# Patient Record
Sex: FEMALE | Race: WHITE | NOT HISPANIC OR LATINO | Employment: FULL TIME | URBAN - METROPOLITAN AREA
[De-identification: names, ages, dates, MRNs, and addresses within clinical notes are randomized per-mention and may not be internally consistent; named-entity substitution may affect disease eponyms.]

---

## 2017-01-19 ENCOUNTER — ALLSCRIPTS OFFICE VISIT (OUTPATIENT)
Dept: OTHER | Facility: OTHER | Age: 57
End: 2017-01-19

## 2017-01-19 DIAGNOSIS — N64.4 MASTODYNIA: ICD-10-CM

## 2017-01-19 DIAGNOSIS — R92.8 OTHER ABNORMAL AND INCONCLUSIVE FINDINGS ON DIAGNOSTIC IMAGING OF BREAST: ICD-10-CM

## 2017-01-19 DIAGNOSIS — Z12.31 ENCOUNTER FOR SCREENING MAMMOGRAM FOR MALIGNANT NEOPLASM OF BREAST: ICD-10-CM

## 2017-01-23 ENCOUNTER — LAB CONVERSION - ENCOUNTER (OUTPATIENT)
Dept: OTHER | Facility: OTHER | Age: 57
End: 2017-01-23

## 2017-01-23 LAB
ADDITIONAL INFORMATION (HISTORICAL): NORMAL
ADEQUACY: (HISTORICAL): NORMAL
COMMENT (HISTORICAL): NORMAL
CYTOTECHNOLOGIST: (HISTORICAL): NORMAL
INTERPRETATION (HISTORICAL): NORMAL
LMP (HISTORICAL): NORMAL
PREV. BX: (HISTORICAL): NORMAL
PREV. PAP (HISTORICAL): NORMAL
SOURCE (HISTORICAL): NORMAL

## 2017-01-30 ENCOUNTER — HOSPITAL ENCOUNTER (OUTPATIENT)
Dept: MAMMOGRAPHY | Facility: MEDICAL CENTER | Age: 57
Discharge: HOME | End: 2017-01-30
Payer: COMMERCIAL

## 2017-01-30 DIAGNOSIS — Z12.31 ENCOUNTER FOR SCREENING MAMMOGRAM FOR MALIGNANT NEOPLASM OF BREAST: ICD-10-CM

## 2017-01-30 PROCEDURE — 77063 BREAST TOMOSYNTHESIS BI: CPT

## 2017-01-30 PROCEDURE — G0202 SCR MAMMO BI INCL CAD: HCPCS

## 2017-02-02 ENCOUNTER — HOSPITAL ENCOUNTER (OUTPATIENT)
Dept: MAMMOGRAPHY | Facility: CLINIC | Age: 57
Discharge: HOME | End: 2017-02-02
Payer: COMMERCIAL

## 2017-02-02 ENCOUNTER — HOSPITAL ENCOUNTER (OUTPATIENT)
Dept: ULTRASOUND IMAGING | Facility: CLINIC | Age: 57
Discharge: HOME | End: 2017-02-02
Payer: COMMERCIAL

## 2017-02-02 DIAGNOSIS — R92.8 MAMMOGRAM ABNORMAL: ICD-10-CM

## 2017-02-02 DIAGNOSIS — N64.4 MASTODYNIA: ICD-10-CM

## 2017-02-02 DIAGNOSIS — R92.8 OTHER ABNORMAL AND INCONCLUSIVE FINDINGS ON DIAGNOSTIC IMAGING OF BREAST: ICD-10-CM

## 2017-02-02 PROCEDURE — G0206 DX MAMMO INCL CAD UNI: HCPCS

## 2017-02-02 PROCEDURE — 76642 ULTRASOUND BREAST LIMITED: CPT

## 2017-08-28 ENCOUNTER — TRANSCRIBE ORDERS (OUTPATIENT)
Dept: ADMINISTRATIVE | Facility: HOSPITAL | Age: 57
End: 2017-08-28

## 2017-08-28 DIAGNOSIS — R00.1 SEVERE SINUS BRADYCARDIA: Primary | ICD-10-CM

## 2017-09-13 ENCOUNTER — HOSPITAL ENCOUNTER (OUTPATIENT)
Dept: NON INVASIVE DIAGNOSTICS | Facility: CLINIC | Age: 57
Discharge: HOME | End: 2017-09-13
Payer: COMMERCIAL

## 2017-09-13 DIAGNOSIS — R00.1 SEVERE SINUS BRADYCARDIA: ICD-10-CM

## 2017-09-13 LAB
CHEST PAIN STATEMENT: NORMAL
MAX DIASTOLIC BP: 100 MMHG
MAX HEART RATE: 164 BPM
MAX PREDICTED HEART RATE: 164 BPM
MAX. SYSTOLIC BP: 160 MMHG
PROTOCOL NAME: NORMAL
TARGET HR FORMULA: NORMAL
TEST INDICATION: NORMAL
TIME IN EXERCISE PHASE: 361 S

## 2017-09-13 PROCEDURE — 93017 CV STRESS TEST TRACING ONLY: CPT

## 2018-01-13 VITALS
WEIGHT: 272.5 LBS | DIASTOLIC BLOOD PRESSURE: 96 MMHG | BODY MASS INDEX: 39.01 KG/M2 | SYSTOLIC BLOOD PRESSURE: 142 MMHG | HEIGHT: 70 IN

## 2018-11-07 DIAGNOSIS — E78.2 MIXED HYPERLIPIDEMIA: Primary | ICD-10-CM

## 2018-11-08 RX ORDER — ATORVASTATIN CALCIUM 10 MG/1
TABLET, FILM COATED ORAL
Qty: 30 TABLET | Refills: 5 | Status: SHIPPED | OUTPATIENT
Start: 2018-11-08 | End: 2019-04-15 | Stop reason: SDUPTHER

## 2019-04-15 ENCOUNTER — OFFICE VISIT (OUTPATIENT)
Dept: CARDIOLOGY CLINIC | Facility: CLINIC | Age: 59
End: 2019-04-15
Payer: COMMERCIAL

## 2019-04-15 VITALS
HEIGHT: 71 IN | DIASTOLIC BLOOD PRESSURE: 80 MMHG | RESPIRATION RATE: 18 BRPM | BODY MASS INDEX: 36.09 KG/M2 | SYSTOLIC BLOOD PRESSURE: 120 MMHG | HEART RATE: 70 BPM | WEIGHT: 257.8 LBS

## 2019-04-15 DIAGNOSIS — E78.2 MIXED HYPERLIPIDEMIA: Primary | ICD-10-CM

## 2019-04-15 DIAGNOSIS — R00.1 BRADYCARDIA: ICD-10-CM

## 2019-04-15 PROCEDURE — 99213 OFFICE O/P EST LOW 20 MIN: CPT | Performed by: INTERNAL MEDICINE

## 2019-04-15 RX ORDER — ATORVASTATIN CALCIUM 20 MG/1
20 TABLET, FILM COATED ORAL DAILY
Qty: 90 TABLET | Refills: 3 | Status: SHIPPED | OUTPATIENT
Start: 2019-04-15 | End: 2020-05-14

## 2019-08-20 ENCOUNTER — OFFICE VISIT (OUTPATIENT)
Dept: FAMILY MEDICINE CLINIC | Facility: CLINIC | Age: 59
End: 2019-08-20
Payer: COMMERCIAL

## 2019-08-20 VITALS
DIASTOLIC BLOOD PRESSURE: 84 MMHG | OXYGEN SATURATION: 97 % | RESPIRATION RATE: 18 BRPM | SYSTOLIC BLOOD PRESSURE: 122 MMHG | HEIGHT: 70 IN | WEIGHT: 266.2 LBS | TEMPERATURE: 98.3 F | BODY MASS INDEX: 38.11 KG/M2 | HEART RATE: 76 BPM

## 2019-08-20 DIAGNOSIS — Z12.31 SCREENING MAMMOGRAM, ENCOUNTER FOR: ICD-10-CM

## 2019-08-20 DIAGNOSIS — Z76.89 ENCOUNTER TO ESTABLISH CARE: ICD-10-CM

## 2019-08-20 DIAGNOSIS — E78.5 HYPERLIPIDEMIA, UNSPECIFIED HYPERLIPIDEMIA TYPE: ICD-10-CM

## 2019-08-20 DIAGNOSIS — Z12.11 COLON CANCER SCREENING: ICD-10-CM

## 2019-08-20 DIAGNOSIS — Z00.00 ROUTINE HEALTH MAINTENANCE: Primary | ICD-10-CM

## 2019-08-20 DIAGNOSIS — K21.9 GASTROESOPHAGEAL REFLUX DISEASE WITHOUT ESOPHAGITIS: ICD-10-CM

## 2019-08-20 DIAGNOSIS — M79.622 LEFT UPPER ARM PAIN: ICD-10-CM

## 2019-08-20 DIAGNOSIS — R53.83 FATIGUE, UNSPECIFIED TYPE: ICD-10-CM

## 2019-08-20 LAB
SL AMB  POCT GLUCOSE, UA: NORMAL
SL AMB LEUKOCYTE ESTERASE,UA: NORMAL
SL AMB POCT BILIRUBIN,UA: NORMAL
SL AMB POCT BLOOD,UA: NORMAL
SL AMB POCT CLARITY,UA: CLEAR
SL AMB POCT COLOR,UA: YELLOW
SL AMB POCT KETONES,UA: NORMAL
SL AMB POCT NITRITE,UA: NORMAL
SL AMB POCT PH,UA: 5
SL AMB POCT SPECIFIC GRAVITY,UA: 1
SL AMB POCT URINE PROTEIN: NORMAL
SL AMB POCT UROBILINOGEN: 0.2

## 2019-08-20 PROCEDURE — 81002 URINALYSIS NONAUTO W/O SCOPE: CPT | Performed by: FAMILY MEDICINE

## 2019-08-20 PROCEDURE — 99386 PREV VISIT NEW AGE 40-64: CPT | Performed by: FAMILY MEDICINE

## 2019-08-20 NOTE — PATIENT INSTRUCTIONS

## 2019-08-20 NOTE — PROGRESS NOTES
FAMILY PRACTICE OFFICE VISIT       NAME: Timo Lux  AGE: 62 y o  SEX: female       : 1960        MRN: [id number]    DATE: 2019  TIME: 10:42 PM    Assessment and Plan     Problem List Items Addressed This Visit     None      Visit Diagnoses     Routine health maintenance    -  Primary    Encounter to establish care        Relevant Orders    POCT urine dip (Completed)    Hyperlipidemia, unspecified hyperlipidemia type        Relevant Orders    Comprehensive metabolic panel    Lipid Panel with Direct LDL reflex    Hemoglobin A1C    Gastroesophageal reflux disease without esophagitis        Relevant Orders    Vitamin B12    Magnesium    BMI 38 0-38 9,adult        Relevant Orders    Hemoglobin A1C    Ambulatory referral to Weight Management    Left upper arm pain        Colon cancer screening        Relevant Orders    Ambulatory referral to Gastroenterology    Screening mammogram, encounter for        Relevant Orders    Mammo screening bilateral w 3d & cad    Fatigue, unspecified type        Relevant Orders    CBC and differential    TSH, 3rd generation with Free T4 reflex    Vitamin D 25 hydroxy        Routine health maintenance:  I will go ahead and check routine blood work  Rx for mammogram provided  Referral to GI provided for screening colonoscopy  Up-to-date with Pap and pelvic exam done by Dr Miranda   Up-to-date with Tdap  Patient maintains well-balanced diet  She exercises regularly  Mood is overall good  Care guided provided  Hyperlipidemia:  Patient is on atorvastatin  Will check lipid panel  Continue with lifestyle modifications including incorporating Mediterranean diet  GERD:  Patient does take Nexium  I feel she would benefit from weaning off Nexium and substituting this with Zantac or Pepcid as well as avoiding triggers that may cause reflux symptoms  May benefit from looking into [place] times bestseller book called dropping acid as well      BMI 38:  Patient states she has tried to lose weight  She maintains well-balanced diet, exercises regularly  I feel she would benefit from evaluation by weight management  She is agreeable  Referral provided  Left upper arm discomfort:  May be secondary to tendinitis versus strain  Patient does not wish to have any workup at present  She will let me know if symptoms worsen  May benefit from physical therapy  May consider ordering ultrasound if symptoms do worsen  Patient Instructions     Wellness Visit for Adults   AMBULATORY CARE:   A wellness visit  is when you see your healthcare provider to get screened for health problems  You can also get advice on how to stay healthy  Write down your questions so you remember to ask them  Ask your healthcare provider how often you should have a wellness visit  What happens at a wellness visit:  Your healthcare provider will ask about your health, and your family history of health problems  This includes high blood pressure, heart disease, and cancer  He or she will ask if you have symptoms that concern you, if you smoke, and about your mood  You may also be asked about your intake of medicines, supplements, food, and alcohol  Any of the following may be done:  · Your weight  will be checked  Your height may also be checked so your body mass index (BMI) can be calculated  Your BMI shows if you are at a healthy weight  · Your blood pressure  and heart rate will be checked  Your temperature may also be checked  · Blood and urine tests  may be done  Blood tests may be done to check your cholesterol levels  Abnormal cholesterol levels increase your risk for heart disease and stroke  You may also need a blood or urine test to check for diabetes if you are at increased risk  Urine tests may be done to look for signs of an infection or kidney disease  · A physical exam  includes checking your heartbeat and lungs with a stethoscope   Your healthcare provider may also check your skin to look for sun damage  · Screening tests  may be recommended  A screening test is done to check for diseases that may not cause symptoms  The screening tests you may need depend on your age, gender, family history, and lifestyle habits  For example, colorectal screening may be recommended if you are 48years old or older  Screening tests you need if you are a woman:   · A Pap smear  is used to screen for cervical cancer  Pap smears are usually done every 3 to 5 years depending on your age  You may need them more often if you have had abnormal Pap smear test results in the past  Ask your healthcare provider how often you should have a Pap smear  · A mammogram  is an x-ray of your breasts to screen for breast cancer  Experts recommend mammograms every 2 years starting at age 48 years  You may need a mammogram at age 52 years or younger if you have an increased risk for breast cancer  Talk to your healthcare provider about when you should start having mammograms and how often you need them  Vaccines you may need:   · Get an influenza vaccine  every year  The influenza vaccine protects you from the flu  Several types of viruses cause the flu  The viruses change over time, so new vaccines are made each year  · Get a tetanus-diphtheria (Td) booster vaccine  every 10 years  This vaccine protects you against tetanus and diphtheria  Tetanus is a severe infection that may cause painful muscle spasms and lockjaw  Diphtheria is a severe bacterial infection that causes a thick covering in the back of your mouth and throat  · Get a human papillomavirus (HPV) vaccine  if you are female and aged 23 to 32 or male 23 to 24 and never received it  This vaccine protects you from HPV infection  HPV is the most common infection spread by sexual contact  HPV may also cause vaginal, penile, and anal cancers  · Get a pneumococcal vaccine  if you are aged 72 years or older   The pneumococcal vaccine is an injection given to protect you from pneumococcal disease  Pneumococcal disease is an infection caused by pneumococcal bacteria  The infection may cause pneumonia, meningitis, or an ear infection  · Get a shingles vaccine  if you are aged 61 or older, even if you have had shingles before  The shingles vaccine is an injection to protect you from the varicella-zoster virus  This is the same virus that causes chickenpox  Shingles is a painful rash that develops in people who had chickenpox or have been exposed to the virus  How to eat healthy:  My Plate is a model for planning healthy meals  It shows the types and amounts of foods that should go on your plate  Fruits and vegetables make up about half of your plate, and grains and protein make up the other half  A serving of dairy is included on the side of your plate  The amount of calories and serving sizes you need depends on your age, gender, weight, and height  Examples of healthy foods are listed below:  · Eat a variety of vegetables  such as dark green, red, and orange vegetables  You can also include canned vegetables low in sodium (salt) and frozen vegetables without added butter or sauces  · Eat a variety of fresh fruits , canned fruit in 100% juice, frozen fruit, and dried fruit  · Include whole grains  At least half of the grains you eat should be whole grains  Examples include whole-wheat bread, wheat pasta, brown rice, and whole-grain cereals such as oatmeal     · Eat a variety of protein foods such as seafood (fish and shellfish), lean meat, and poultry without skin (turkey and chicken)  Examples of lean meats include pork leg, shoulder, or tenderloin, and beef round, sirloin, tenderloin, and extra lean ground beef  Other protein foods include eggs and egg substitutes, beans, peas, soy products, nuts, and seeds  · Choose low-fat dairy products such as skim or 1% milk or low-fat yogurt, cheese, and cottage cheese       · Limit unhealthy fats such as butter, hard margarine, and shortening  Exercise:  Exercise at least 30 minutes per day on most days of the week  Some examples of exercise include walking, biking, dancing, and swimming  You can also fit in more physical activity by taking the stairs instead of the elevator or parking farther away from stores  Include muscle strengthening activities 2 days each week  Regular exercise provides many health benefits  It helps you manage your weight, and decreases your risk for type 2 diabetes, heart disease, stroke, and high blood pressure  Exercise can also help improve your mood  Ask your healthcare provider about the best exercise plan for you  General health and safety guidelines:   · Do not smoke  Nicotine and other chemicals in cigarettes and cigars can cause lung damage  Ask your healthcare provider for information if you currently smoke and need help to quit  E-cigarettes or smokeless tobacco still contain nicotine  Talk to your healthcare provider before you use these products  · Limit alcohol  A drink of alcohol is 12 ounces of beer, 5 ounces of wine, or 1½ ounces of liquor  · Lose weight, if needed  Being overweight increases your risk of certain health conditions  These include heart disease, high blood pressure, type 2 diabetes, and certain types of cancer  · Protect your skin  Do not sunbathe or use tanning beds  Use sunscreen with a SPF 15 or higher  Apply sunscreen at least 15 minutes before you go outside  Reapply sunscreen every 2 hours  Wear protective clothing, hats, and sunglasses when you are outside  · Drive safely  Always wear your seatbelt  Make sure everyone in your car wears a seatbelt  A seatbelt can save your life if you are in an accident  Do not use your cell phone when you are driving  This could distract you and cause an accident  Pull over if you need to make a call or send a text message  · Practice safe sex    Use latex condoms if are sexually active and have more than one partner  Your healthcare provider may recommend screening tests for sexually transmitted infections (STIs)  · Wear helmets, lifejackets, and protective gear  Always wear a helmet when you ride a bike or motorcycle, go skiing, or play sports that could cause a head injury  Wear protective equipment when you play sports  Wear a lifejacket when you are on a boat or doing water sports  © 2017 [employer] Information is for End User's use only and may not be sold, redistributed or otherwise used for commercial purposes  All illustrations and images included in CareNotes® are the copyrighted property of A D A M , Inc  or [employer]  The above information is an  only  It is not intended as medical advice for individual conditions or treatments  Talk to your doctor, nurse or pharmacist before following any medical regimen to see if it is safe and effective for you  Chief Complaint     Chief Complaint   Patient presents with    Establish Care    Well Check    Arm Pain     Left upper arm       History of Present Illness     HPI  80-year-old female with a history of HLD, GERD, uveitis here to establish care  Prior PCP was Dr Madrid who recently retired  Has h/o uveitis, follows with retinologist Dr Jorge Alberto Browne once every 9 months   Autoimmune markers have been normal  Has a h/o lyme disease treated for it 20 yrs ago  H/o fracture of L1, L4-L5 disc bulge  Has been seen by [facility] in the past   Sees dermatoligst, Dr Yodit He, once a year  Has a h/o squamous cell skin ca  Social alcohol use  2-3 cups coffee daily   Usually consumes Water, seltzer water  Exercises regularly, does yoga 2x/week, works out with  once a week, spin class, boot camp once a week    2 yrs ago, had bouts of dizziness   Has seen dr Lemus Fly dr carrillo for Pap and pelvic exam, last seen 2 yrs ago   last pap 2 ys ago, wnl  Last mammogram in 2017  Takes turmeric capsules, calcium 500 mg, vitamin d 1000 iu, magnesium 30 mg     Has left upper arm pain X 1 month, intermittent, aggravated with activity,  was lifting boxes   lifting arm upwards aggravates her pain  Does a lot of computer work  Took ibuprofen, last taken 1 week ago   Is right handed   denies tinglng, numbness, weakness  Patient does not wish to have any workup at present  She will monitor  Review of Systems   Review of Systems   Constitutional: Negative for unexpected weight change  Eyes: Negative for visual disturbance  Respiratory: Negative for shortness of breath  Cardiovascular: Negative  Gastrointestinal: Negative for abdominal pain and constipation  Genitourinary: Negative for dysuria  Neurological: Negative for dizziness and light-headedness  Psychiatric/Behavioral: Negative for dysphoric mood and sleep disturbance         Active Problem List     Patient Active Problem List   Diagnosis    Mixed hyperlipidemia    Bradycardia       Past Medical History:  Past Medical History:   Diagnosis Date    Back pain     Bradycardia     DDD (degenerative disc disease), lumbar     DJD (degenerative joint disease)     Female infertility     GERD (gastroesophageal reflux disease)     Hypercholesterolemia     Obesity     Uveitis     Vertigo        Past Surgical History:  Past Surgical History:   Procedure Laterality Date    DILATION AND CURETTAGE OF UTERUS      HYSTEROSCOPY  01/09/2012    MYOMECTOMY      TONSILLECTOMY AND ADENOIDECTOMY         Family History:  Family History   Problem Relation Age of Onset    Hypertension Mother     Heart murmur Father     Hyperlipidemia Family        Social History:  Social History     Socioeconomic History    Marital status: /Civil Union     Spouse name: Not on file    Number of children: Not on file    Years of education: Not on file    Highest education level: Not on file   Occupational History    Not on file   Social Needs    Financial resource strain: Not on file    Food insecurity:     Worry: Not on file     Inability: Not on file    Transportation needs:     Medical: Not on file     Non-medical: Not on file   Tobacco Use    Smoking status: Never Smoker    Smokeless tobacco: Never Used   Substance and Sexual Activity    Alcohol use: Yes     Frequency: Monthly or less     Drinks per session: 1 or 2     Binge frequency: Never     Comment: socially    Drug use: Not on file    Sexual activity: Not on file   Lifestyle    Physical activity:     Days per week: Not on file     Minutes per session: Not on file    Stress: Not on file   Relationships    Social connections:     Talks on phone: Not on file     Gets together: Not on file     Attends Confucianist service: Not on file     Active member of club or organization: Not on file     Attends meetings of clubs or organizations: Not on file     Relationship status: Not on file    Intimate partner violence:     Fear of current or ex partner: Not on file     Emotionally abused: Not on file     Physically abused: Not on file     Forced sexual activity: Not on file   Other Topics Concern    Not on file   Social History Narrative    Not on file     I have reviewed the patient's medical history in detail; there are no changes to the history as noted in the electronic medical record  Objective     Vitals:    08/20/19 1349   BP: 122/84   Pulse: 76   Resp: 18   Temp: 98 3 °F (36 8 °C)   SpO2: 97%     Wt Readings from Last 3 Encounters:   08/20/19 121 kg (266 lb 3 2 oz)   04/15/19 117 kg (257 lb 12 8 oz)   01/19/17 124 kg (272 lb 8 oz)       Physical Exam   Constitutional: She appears well-developed and well-nourished  HENT:   Head: Normocephalic and atraumatic  Mouth/Throat: Oropharynx is clear and moist    TMs intact and clear   Eyes: Pupils are equal, round, and reactive to light  Conjunctivae and EOM are normal    Neck: Normal range of motion  Neck supple  No thyromegaly present  Cardiovascular: Normal rate, regular rhythm and normal heart sounds  No carotid bruits auscultated   Pulmonary/Chest: Effort normal and breath sounds normal    Abdominal: Soft  Bowel sounds are normal  She exhibits no distension  There is no tenderness  Musculoskeletal: Normal range of motion  She exhibits no edema  Lymphadenopathy:     She has no cervical adenopathy  Neurological: She is alert  Psychiatric: She has a normal mood and affect  Nursing note and vitals reviewed        Pertinent Laboratory/Diagnostic Studies:  No results found for: GLUCOSE, BUN, CREATININE, CALCIUM, NA, K, CO2, CL  No results found for: ALT, AST, GGT, ALKPHOS, BILITOT    No results found for: WBC, HGB, HCT, MCV, PLT    No results found for: TSH    No results found for: CHOL  No results found for: TRIG  No results found for: HDL  No results found for: LDLCALC  No results found for: HGBA1C    Results for orders placed or performed in visit on 08/20/19   POCT urine dip   Result Value Ref Range    LEUKOCYTE ESTERASE,UA -     NITRITE,UA -     SL AMB POCT UROBILINOGEN 0 2     POCT URINE PROTEIN -      PH,UA 5 0     BLOOD,UA -     SPECIFIC GRAVITY,UA 1 000     KETONES,UA -     BILIRUBIN,UA -     GLUCOSE, UA -      COLOR,UA Yellow     CLARITY,UA Clear        Orders Placed This Encounter   Procedures    Mammo screening bilateral w 3d & cad    CBC and differential    Comprehensive metabolic panel    Lipid Panel with Direct LDL reflex    TSH, 3rd generation with Free T4 reflex    Vitamin D 25 hydroxy    Vitamin B12    Magnesium    Hemoglobin A1C    Ambulatory referral to Gastroenterology    Ambulatory referral to Weight Management    POCT urine dip       ALLERGIES:  Allergies   Allergen Reactions    Neomycin-Bacitracin Zn-Polymyx Rash       Current Medications     Current Outpatient Medications   Medication Sig Dispense Refill    atorvastatin (LIPITOR) 20 mg tablet Take 1 tablet (20 mg total) by mouth daily 90 tablet 3  esomeprazole (NexIUM) 20 mg capsule Take 20 mg by mouth every morning before breakfast       No current facility-administered medications for this visit            Health Maintenance     Health Maintenance   Topic Date Due    Hepatitis C Screening  1960    Depression Screening PHQ  1960    CRC Screening: Colonoscopy  1960    BMI: Followup Plan  12/25/1978    MAMMOGRAM  02/02/2018    INFLUENZA VACCINE  07/01/2019    BMI: Adult  08/20/2020    DTaP,Tdap,and Td Vaccines (2 - Td) 04/19/2021    Pneumococcal Vaccine: 65+ Years (1 of 2 - PCV13) 12/25/2025    Pneumococcal Vaccine: Pediatrics (0 to 5 Years) and At-Risk Patients (6 to 59 Years)  Aged Out    HEPATITIS B VACCINES  Aged [employer] History   Administered Date(s) Administered    INFLUENZA 11/15/2014, 10/13/2017, 10/09/2018    Tdap 04/19/2011       Hal Toledo MD

## 2019-08-26 ENCOUNTER — OFFICE VISIT (OUTPATIENT)
Dept: CARDIOLOGY CLINIC | Facility: CLINIC | Age: 59
End: 2019-08-26
Payer: COMMERCIAL

## 2019-08-26 VITALS
SYSTOLIC BLOOD PRESSURE: 110 MMHG | HEIGHT: 70 IN | DIASTOLIC BLOOD PRESSURE: 75 MMHG | HEART RATE: 68 BPM | WEIGHT: 265.4 LBS | BODY MASS INDEX: 37.99 KG/M2

## 2019-08-26 DIAGNOSIS — E78.2 MIXED HYPERLIPIDEMIA: Primary | ICD-10-CM

## 2019-08-26 PROCEDURE — 99213 OFFICE O/P EST LOW 20 MIN: CPT | Performed by: INTERNAL MEDICINE

## 2019-08-26 RX ORDER — MULTIVIT-MIN/IRON FUM/FOLIC AC 7.5 MG-4
1 TABLET ORAL DAILY
COMMUNITY

## 2019-08-26 NOTE — PROGRESS NOTES
Assessment/Plan:    Mixed hyperlipidemia  Hyperlipidemia, stable  The patient is scheduled for follow-up lab  She will continue atorvastatin at 20 mg daily  Diagnoses and all orders for this visit:    Mixed hyperlipidemia    Other orders  -     co-enzyme Q-10 30 MG capsule; Take 30 mg by mouth 3 (three) times a day  -     Multiple Vitamins-Minerals (MULTIVITAMIN WITH MINERALS) tablet; Take 1 tablet by mouth daily          Subjective:  Feels well  Patient ID: Lui Thornton is a 62 y o  female  Patient presented to this office for the purpose of cardiac follow-up  She has a known history of hypercholesterolemia  She has been feeling well denying any symptoms of chest pain, shortness of breath, palpitation, dizziness or lightheadedness  She has no leg edema  The following portions of the patient's history were reviewed and updated as appropriate: allergies, current medications, past family history, past medical history, past social history, past surgical history and problem list     Review of Systems   Respiratory: Negative for apnea, cough, chest tightness, shortness of breath and wheezing  Cardiovascular: Negative for chest pain, palpitations and leg swelling  Gastrointestinal: Negative for abdominal pain  Neurological: Negative for dizziness and light-headedness  Objective:  Stable cardiac-wise  /75 (BP Location: Right leg, Patient Position: Sitting, Cuff Size: Large)   Pulse 68   Ht 5' 10" (1 778 m)   Wt 120 kg (265 lb 6 4 oz)   BMI 38 08 kg/m²          Physical Exam   Constitutional: She appears well-developed and well-nourished  No distress  HENT:   Head: Normocephalic and atraumatic  Neck: Normal range of motion  Neck supple  No JVD present  No thyromegaly present  Cardiovascular: Normal rate, regular rhythm, S1 normal and S2 normal  Exam reveals no gallop and no friction rub  Murmur heard     Systolic murmur is present with a grade of 1/6   Pulmonary/Chest: Effort normal  No respiratory distress  She has no wheezes  She has no rales  She exhibits no tenderness  Abdominal: Soft  Skin: She is not diaphoretic  Vitals reviewed

## 2019-08-26 NOTE — ASSESSMENT & PLAN NOTE
Hyperlipidemia, stable  The patient is scheduled for follow-up lab  She will continue atorvastatin at 20 mg daily

## 2019-08-26 NOTE — LETTER
August 26, 2019     Nicholas Alvarez MD  [address] [facility] [address]    Patient: Vashti Rea   YOB: 1960   Date of Visit: 8/26/2019       Dear Dr Roldan Aparicio:    Thank you for referring Jocelin Moreno to me for evaluation  Below are my notes for this consultation  If you have questions, please do not hesitate to call me  I look forward to following your patient along with you  Sincerely,        Martha Sibley MD        CC: No Recipients  Martha Sibley MD  8/26/2019  9:27 AM  Sign at close encounter  Assessment/Plan:    Mixed hyperlipidemia  Hyperlipidemia, stable  The patient is scheduled for follow-up lab  She will continue atorvastatin at 20 mg daily  Diagnoses and all orders for this visit:    Mixed hyperlipidemia    Other orders  -     co-enzyme Q-10 30 MG capsule; Take 30 mg by mouth 3 (three) times a day  -     Multiple Vitamins-Minerals (MULTIVITAMIN WITH MINERALS) tablet; Take 1 tablet by mouth daily          Subjective:  Feels well  Patient ID: Vashti Rea is a 62 y o  female  Patient presented to this office for the purpose of cardiac follow-up  She has a known history of hypercholesterolemia  She has been feeling well denying any symptoms of chest pain, shortness of breath, palpitation, dizziness or lightheadedness  She has no leg edema  The following portions of the patient's history were reviewed and updated as appropriate: allergies, current medications, past family history, past medical history, past social history, past surgical history and problem list     Review of Systems   Respiratory: Negative for apnea, cough, chest tightness, shortness of breath and wheezing  Cardiovascular: Negative for chest pain, palpitations and leg swelling  Gastrointestinal: Negative for abdominal pain  Neurological: Negative for dizziness and light-headedness  Objective:  Stable cardiac-wise        /75 (BP Location: Right leg, Patient Position: Sitting, Cuff Size: Large)   Pulse 68   Ht 5' 10" (1 778 m)   Wt 120 kg (265 lb 6 4 oz)   BMI 38 08 kg/m²           Physical Exam   Constitutional: She appears well-developed and well-nourished  No distress  HENT:   Head: Normocephalic and atraumatic  Neck: Normal range of motion  Neck supple  No JVD present  No thyromegaly present  Cardiovascular: Normal rate, regular rhythm, S1 normal and S2 normal  Exam reveals no gallop and no friction rub  Murmur heard  Systolic murmur is present with a grade of 1/6  Pulmonary/Chest: Effort normal  No respiratory distress  She has no wheezes  She has no rales  She exhibits no tenderness  Abdominal: Soft  Skin: She is not diaphoretic  Vitals reviewed

## 2019-10-30 ENCOUNTER — TELEPHONE (OUTPATIENT)
Dept: FAMILY MEDICINE CLINIC | Facility: CLINIC | Age: 59
End: 2019-10-30

## 2019-10-30 LAB
25(OH)D3 SERPL-MCNC: 38 NG/ML (ref 30–100)
ALBUMIN SERPL-MCNC: 4.3 G/DL (ref 3.6–5.1)
ALBUMIN/GLOB SERPL: 1.9 (CALC) (ref 1–2.5)
ALP SERPL-CCNC: 93 U/L (ref 33–130)
ALT SERPL-CCNC: 14 U/L (ref 6–29)
AST SERPL-CCNC: 14 U/L (ref 10–35)
BASOPHILS # BLD AUTO: 10 CELLS/UL (ref 0–200)
BASOPHILS NFR BLD AUTO: 0.2 %
BILIRUB SERPL-MCNC: 0.6 MG/DL (ref 0.2–1.2)
BUN SERPL-MCNC: 15 MG/DL (ref 7–25)
BUN/CREAT SERPL: NORMAL (CALC) (ref 6–22)
CALCIUM SERPL-MCNC: 9.7 MG/DL (ref 8.6–10.4)
CHLORIDE SERPL-SCNC: 106 MMOL/L (ref 98–110)
CHOLEST SERPL-MCNC: 146 MG/DL
CHOLEST/HDLC SERPL: 3.3 (CALC)
CO2 SERPL-SCNC: 29 MMOL/L (ref 20–32)
CREAT SERPL-MCNC: 0.99 MG/DL (ref 0.5–1.05)
EOSINOPHIL # BLD AUTO: 307 CELLS/UL (ref 15–500)
EOSINOPHIL NFR BLD AUTO: 6.4 %
ERYTHROCYTE [DISTWIDTH] IN BLOOD BY AUTOMATED COUNT: 14.2 % (ref 11–15)
GLOBULIN SER CALC-MCNC: 2.3 G/DL (CALC) (ref 1.9–3.7)
GLUCOSE SERPL-MCNC: 94 MG/DL (ref 65–99)
HBA1C MFR BLD: 5.8 % OF TOTAL HGB
HCT VFR BLD AUTO: 39.5 % (ref 35–45)
HDLC SERPL-MCNC: 44 MG/DL
HGB BLD-MCNC: 13 G/DL (ref 11.7–15.5)
LDLC SERPL CALC-MCNC: 84 MG/DL (CALC)
LYMPHOCYTES # BLD AUTO: 1330 CELLS/UL (ref 850–3900)
LYMPHOCYTES NFR BLD AUTO: 27.7 %
MAGNESIUM SERPL-MCNC: 2 MG/DL (ref 1.5–2.5)
MCH RBC QN AUTO: 30 PG (ref 27–33)
MCHC RBC AUTO-ENTMCNC: 32.9 G/DL (ref 32–36)
MCV RBC AUTO: 91.2 FL (ref 80–100)
MONOCYTES # BLD AUTO: 307 CELLS/UL (ref 200–950)
MONOCYTES NFR BLD AUTO: 6.4 %
NEUTROPHILS # BLD AUTO: 2846 CELLS/UL (ref 1500–7800)
NEUTROPHILS NFR BLD AUTO: 59.3 %
NONHDLC SERPL-MCNC: 102 MG/DL (CALC)
PLATELET # BLD AUTO: 203 THOUSAND/UL (ref 140–400)
PMV BLD REES-ECKER: 12.1 FL (ref 7.5–12.5)
POTASSIUM SERPL-SCNC: 4.3 MMOL/L (ref 3.5–5.3)
PROT SERPL-MCNC: 6.6 G/DL (ref 6.1–8.1)
RBC # BLD AUTO: 4.33 MILLION/UL (ref 3.8–5.1)
SL AMB EGFR AFRICAN AMERICAN: 73 ML/MIN/1.73M2
SL AMB EGFR NON AFRICAN AMERICAN: 63 ML/MIN/1.73M2
SODIUM SERPL-SCNC: 141 MMOL/L (ref 135–146)
TRIGL SERPL-MCNC: 85 MG/DL
TSH SERPL-ACNC: 3.84 MIU/L (ref 0.4–4.5)
VIT B12 SERPL-MCNC: 732 PG/ML (ref 200–1100)
WBC # BLD AUTO: 4.8 THOUSAND/UL (ref 3.8–10.8)

## 2019-10-30 NOTE — TELEPHONE ENCOUNTER
I would like to see her on a yearly basis for physical   We can check her blood sugar at that point as it is only mildly elevated  I am also happy to recheck it again in 6 months if she would like as well    Thank you

## 2019-10-30 NOTE — TELEPHONE ENCOUNTER
----- Message from Dylan Harper MD sent at 10/30/2019 12:33 PM EDT -----  Please let patient know that her blood work including liver, kidneys, vitamin B12, magnesium, thyroid was within normal range  Her hemoglobin A1c, measure blood sugar over 3 month period was noted to be in the early prediabetic range  I would like her to work on avoiding sweets, limiting carbohydrates and working on routine exercise regimen  In addition, her vitamin-D was normal but it was a little lower normal and I would like her to take an additional 1000 International Units in the winter months  In addition, her total cholesterol and bad cholesterol were good however her good cholesterol is a little lower and I would like her to work on increasing heart healthy fats and diet by incorporating Mediterranean diet as well as regular exercise    Thank you

## 2019-10-30 NOTE — RESULT ENCOUNTER NOTE
Please let patient know that her blood work including liver, kidneys, vitamin B12, magnesium, thyroid was within normal range  Her hemoglobin A1c, measure blood sugar over 3 month period was noted to be in the early prediabetic range  I would like her to work on avoiding sweets, limiting carbohydrates and working on routine exercise regimen  In addition, her vitamin-D was normal but it was a little lower normal and I would like her to take an additional 1000 International Units in the winter months  In addition, her total cholesterol and bad cholesterol were good however her good cholesterol is a little lower and I would like her to work on increasing heart healthy fats and diet by incorporating Mediterranean diet as well as regular exercise    Thank you

## 2019-11-26 ENCOUNTER — TELEPHONE (OUTPATIENT)
Dept: FAMILY MEDICINE CLINIC | Facility: CLINIC | Age: 59
End: 2019-11-26

## 2019-12-26 NOTE — PROGRESS NOTES
Assessment/Plan:      Diagnoses and all orders for this visit:    Obesity, Class II, BMI 35-39 9    BMI 38 0-38 9,adult  -     Ambulatory referral to Weight Management    Gastroesophageal reflux disease, esophagitis presence not specified    Mixed hyperlipidemia    IFG (impaired fasting glucose)      -Discussed options of Healthy[facility]-Intensive Lifestyle Intervention Program, Very Low Calorie Diet-VLCD and Conservative Program and the role of weight loss medications   -Initial weight loss goal of 5-10% weight loss for improved health  -Screening labs- 10/29/19  -Patient is interested in pursuing Conservative Program    Goals:  Food log (ie ) www myfitnesspal com,sparkpeople  com,loseit com,calorieking  com,etc  baritastic  No sugary beverages  At least 64oz of water daily  Increase physical activity by 10 minutes daily  Gradually increase physical activity to a goal of 5 days per week for 30 minutes of MODERATE intensity PLUS 2 days per week of FULL BODY resistance training  Goal protein intake of 60-80 grams per day and 2039-3508 (exercise days) calories per day  IF- 10-6pm    45 minute visit, >50% face-to-face time spent counseling patient on nonsurgical interventions for the treatment of excess weight  Discussed in detail nonsurgical options including intensive lifestyle intervention program, very low-calorie diet program and conservative program   Discussed the role of weight loss medications  Counseled patient on diet behavior and exercise modification for weight loss  Follow up in approximately 2 months with Non-Surgical Physician/Advanced Practitioner  Subjective:   Chief Complaint   Patient presents with    Consult     mwm consult       Patient ID: Rohini Jiang  is a 61 y o  female with excess weight/obesity here to pursue weight management      Past Medical History:   Diagnosis Date    Back pain     Bradycardia     DDD (degenerative disc disease), lumbar     DJD (degenerative joint disease)  Female infertility     GERD (gastroesophageal reflux disease)     Hypercholesterolemia     Obesity     Uveitis     Vertigo        HPI:  Obesity/Excess Weight:  Severity: class 2  Onset:  Since teen, she had to take steroid for uveitis in her teens and gained a lot of weight    Modifiers: Diet and Exercise  Contributing factors: Menopause and Medications, after fertility treatment   Associated symptoms: comorbid conditions and increased joint pain    Goals: 160lbs  Hydration:  Alcohol: ocasionally   Smoking: no  Exercise:  2-3 times a week   Sleep: 8hr  STOP ban/8    Social:  Lives with  and 13year old daughter  Work full time clinical research  for [employer]      The following portions of the patient's history were reviewed and updated as appropriate: allergies, current medications, past family history, past medical history, past social history, past surgical history and problem list     Review of Systems   Constitutional: Negative for activity change and appetite change  HENT: Negative  Respiratory: Negative  Cardiovascular: Negative  Gastrointestinal: Negative  Genitourinary: Negative  Musculoskeletal: Negative for arthralgias  Skin: Negative for rash  Neurological: Negative  Psychiatric/Behavioral: Negative  Objective:    /100 (BP Location: Left arm, Patient Position: Sitting, Cuff Size: Large)   Pulse 70   Temp (!) 97 1 °F (36 2 °C) (Tympanic)   Resp 18   Ht 5' 10 5" (1 791 m)   Wt 122 kg (269 lb 14 4 oz)   BMI 38 18 kg/m²     Physical Exam    Constitutional   General appearance: Abnormal   well developed and obese  Eyes No conjunctival pallor  Ears, Nose, Mouth, and Throat Oral mucosa moist    Pulmonary   Respiratory effort: No increased work of breathing or signs of respiratory distress  Auscultation of lungs: Clear to auscultation, equal breath sounds bilaterally, no wheezes, no rales, no rhonci      Cardiovascular Auscultation of heart: Normal rate and rhythm, normal S1 and S2, without murmurs  Examination of extremities for edema and/or varicosities: Normal   no edema  Abdomen   Abdomen: Abnormal   The abdomen was obese  Bowel sounds were normal  The abdomen was soft and nontender     Musculoskeletal   Gait and station: Normal     Psychiatric   Orientation to person, place and time: Normal     Affect: appropriate

## 2019-12-27 ENCOUNTER — CONSULT (OUTPATIENT)
Dept: BARIATRICS | Facility: CLINIC | Age: 59
End: 2019-12-27
Payer: COMMERCIAL

## 2019-12-27 VITALS
HEIGHT: 71 IN | BODY MASS INDEX: 37.78 KG/M2 | WEIGHT: 269.9 LBS | HEART RATE: 70 BPM | SYSTOLIC BLOOD PRESSURE: 120 MMHG | DIASTOLIC BLOOD PRESSURE: 100 MMHG | RESPIRATION RATE: 18 BRPM | TEMPERATURE: 97.1 F

## 2019-12-27 DIAGNOSIS — R73.01 IFG (IMPAIRED FASTING GLUCOSE): ICD-10-CM

## 2019-12-27 DIAGNOSIS — E66.9 OBESITY, CLASS II, BMI 35-39.9: Primary | ICD-10-CM

## 2019-12-27 DIAGNOSIS — K21.9 GASTROESOPHAGEAL REFLUX DISEASE, ESOPHAGITIS PRESENCE NOT SPECIFIED: ICD-10-CM

## 2019-12-27 DIAGNOSIS — E78.2 MIXED HYPERLIPIDEMIA: ICD-10-CM

## 2019-12-27 PROBLEM — N64.4 BREAST PAIN: Status: ACTIVE | Noted: 2017-02-01

## 2019-12-27 PROBLEM — E66.812 OBESITY, CLASS II, BMI 35-39.9: Status: ACTIVE | Noted: 2019-12-27

## 2019-12-27 PROBLEM — R92.8 ABNORMAL FINDING ON MAMMOGRAPHY: Status: ACTIVE | Noted: 2017-02-01

## 2019-12-27 PROBLEM — R92.30 DENSE BREAST TISSUE: Status: ACTIVE | Noted: 2017-01-19

## 2019-12-27 PROBLEM — R92.2 DENSE BREAST TISSUE: Status: ACTIVE | Noted: 2017-01-19

## 2019-12-27 PROCEDURE — 99243 OFF/OP CNSLTJ NEW/EST LOW 30: CPT | Performed by: FAMILY MEDICINE

## 2019-12-27 NOTE — PATIENT INSTRUCTIONS
Goals: Food log (ie ) www myfitnesspal com,sparkpeople  com,loseit com,calorieking  com,etc  baritastic  No sugary beverages  At least 64oz of water daily  Increase physical activity by 10 minutes daily  Gradually increase physical activity to a goal of 5 days per week for 30 minutes of MODERATE intensity PLUS 2 days per week of FULL BODY resistance training  Goal protein intake of 60-80 grams per day and 1750-0527 (exercise days) calories per day    IF 10am to 6pm

## 2020-01-03 ENCOUNTER — OFFICE VISIT (OUTPATIENT)
Dept: BARIATRICS | Facility: CLINIC | Age: 60
End: 2020-01-03

## 2020-01-03 VITALS — HEIGHT: 71 IN | BODY MASS INDEX: 37.6 KG/M2 | WEIGHT: 268.6 LBS

## 2020-01-03 DIAGNOSIS — R63.5 ABNORMAL WEIGHT GAIN: ICD-10-CM

## 2020-01-03 PROCEDURE — RECHECK

## 2020-01-03 PROCEDURE — WEIGHT

## 2020-01-09 NOTE — PROGRESS NOTES
Weight Management Medical Nutrition Assessment  Keyla Jara resented for a meal planning session  Today's weight is 267 7#   Per dietary recall patient consumes excess calories from eating large portions at both lunch and dinner meal  She often has a long period between her am and lunch meal in which she states she becomes ravenous and then over eats lunch meal  Developed a low calorie meal plan that focused on reducing portions a meal times and improves timeframe of meals  Anthropometric Measurements  Start Weight (#): 267 7#  Current Weight (#): n/a  TBW % Change from start weight:n/a  Ideal Body Weight (#):152 5#  Goal Weight (#):ST#     Weight Loss History  Previous weight loss attempts: Self Created Diets (Portion Control, Healthy Food Choices, etc )    Food and Nutrition Related History    Food Recall  Breakfast:hard boiled egg/ coffee black     Snack:  Lunch:delayed - trouble area- larger portions  Leftovers from dinner  Snack:skip  Dinner: Virgie food - lean protein/ veggies - large portions   Snack: cheese - cheddar/ munster      Beverages: seltzer water and coffee- 20oz   Volume of beverage intake: 40oz    Weekends: higher calories with dining out  Cravings: sweet sometimes  Trouble area of day:later afternoon and mealtimes    Frequency of Eating out: biweekly - trying to eat 1/2 but having appetizer   Food restrictions:none reported  Cooking: self   Food Shopping: self    Physical Activity Intake  Activity: 1 x   Boot camp x 1 week Spinning class 1 x week   Frequency:several times per week  Physical limitations/barriers to exercise: none reported    Estimated Needs  Energy  Bear Steffanie Energy Needs: BMR : 1879 calories    1-2# loss weekly sedentary:  0707-0412 calories              1-2# loss weekly lightly active:2580-7677 calories   Protein:83-103gm      (1 2-1 5g/kg IBW)  Fluid: 80oz     (35mL/kg IBW)    Nutrition Diagnosis  Yes;     Overweight/obesity  related to Excess energy intake as evidenced by  BMI more than normative standard for age and sex (obesity-grade III 36+)       Nutrition Intervention    Nutrition Prescription  Calories: 1400 calories daily and flex to 1600 calories for cardio days  Protein:80-100gm  Fluid:80oz    Meal Plan (Bharat/Pro/Carb)  Breakfast: 200-300/20/30  Snack:  Lunch:400/30/30  Snack:200  Dinner:400/30/30  Snack:200    Nutrition Education:    Calorie controlled menu  Lean protein food choices  Healthy snack options  Food journaling tips      Nutrition Counseling:  Strategies: meal planning, portion sizes, healthy snack choices, hydration, fiber intake, protein intake, exercise, food journal      Monitoring and Evaluation:  Evaluation criteria:  Energy Intake  Meet protein needs  Maintain adequate hydration  Monitor weekly weight  Meal planning/preparation  Food journal   Decreased portions at mealtimes and snacks  Physical activity     Barriers to learning:none  Readiness to change: Action:  (Changing behavior)  Comprehension: good  Expected Compliance: good

## 2020-01-10 ENCOUNTER — OFFICE VISIT (OUTPATIENT)
Dept: BARIATRICS | Facility: CLINIC | Age: 60
End: 2020-01-10

## 2020-01-10 VITALS — BODY MASS INDEX: 37.48 KG/M2 | WEIGHT: 267.7 LBS | HEIGHT: 71 IN

## 2020-01-10 DIAGNOSIS — R63.5 ABNORMAL WEIGHT GAIN: ICD-10-CM

## 2020-01-10 PROCEDURE — WMDI30: Performed by: DIETITIAN, REGISTERED

## 2020-01-10 PROCEDURE — RECHECK: Performed by: DIETITIAN, REGISTERED

## 2020-05-14 DIAGNOSIS — E78.2 MIXED HYPERLIPIDEMIA: ICD-10-CM

## 2020-05-14 RX ORDER — ATORVASTATIN CALCIUM 20 MG/1
TABLET, FILM COATED ORAL
Qty: 90 TABLET | Refills: 3 | Status: SHIPPED | OUTPATIENT
Start: 2020-05-14 | End: 2021-05-17

## 2020-10-26 ENCOUNTER — OFFICE VISIT (OUTPATIENT)
Dept: FAMILY MEDICINE CLINIC | Facility: CLINIC | Age: 60
End: 2020-10-26
Payer: COMMERCIAL

## 2020-10-26 ENCOUNTER — TELEPHONE (OUTPATIENT)
Dept: FAMILY MEDICINE CLINIC | Facility: CLINIC | Age: 60
End: 2020-10-26

## 2020-10-26 VITALS
DIASTOLIC BLOOD PRESSURE: 82 MMHG | WEIGHT: 273.2 LBS | BODY MASS INDEX: 38.25 KG/M2 | HEART RATE: 62 BPM | TEMPERATURE: 98.2 F | OXYGEN SATURATION: 97 % | RESPIRATION RATE: 16 BRPM | HEIGHT: 71 IN | SYSTOLIC BLOOD PRESSURE: 136 MMHG

## 2020-10-26 DIAGNOSIS — E66.9 OBESITY, CLASS II, BMI 35-39.9: ICD-10-CM

## 2020-10-26 DIAGNOSIS — M54.6 ACUTE MIDLINE THORACIC BACK PAIN: ICD-10-CM

## 2020-10-26 DIAGNOSIS — E78.2 MIXED HYPERLIPIDEMIA: ICD-10-CM

## 2020-10-26 DIAGNOSIS — Z01.818 PRE-OP EXAM: Primary | ICD-10-CM

## 2020-10-26 DIAGNOSIS — R73.01 IFG (IMPAIRED FASTING GLUCOSE): ICD-10-CM

## 2020-10-26 DIAGNOSIS — K21.9 GASTROESOPHAGEAL REFLUX DISEASE, UNSPECIFIED WHETHER ESOPHAGITIS PRESENT: ICD-10-CM

## 2020-10-26 LAB
SL AMB  POCT GLUCOSE, UA: NORMAL
SL AMB LEUKOCYTE ESTERASE,UA: NORMAL
SL AMB POCT BILIRUBIN,UA: NORMAL
SL AMB POCT BLOOD,UA: NORMAL
SL AMB POCT CLARITY,UA: NORMAL
SL AMB POCT COLOR,UA: NORMAL
SL AMB POCT KETONES,UA: NORMAL
SL AMB POCT NITRITE,UA: NORMAL
SL AMB POCT PH,UA: 6
SL AMB POCT SPECIFIC GRAVITY,UA: 1.01
SL AMB POCT URINE PROTEIN: NORMAL
SL AMB POCT UROBILINOGEN: 0.2

## 2020-10-26 PROCEDURE — 3725F SCREEN DEPRESSION PERFORMED: CPT | Performed by: NURSE PRACTITIONER

## 2020-10-26 PROCEDURE — 99214 OFFICE O/P EST MOD 30 MIN: CPT | Performed by: NURSE PRACTITIONER

## 2020-10-26 PROCEDURE — 81003 URINALYSIS AUTO W/O SCOPE: CPT | Performed by: NURSE PRACTITIONER

## 2020-10-26 PROCEDURE — 93000 ELECTROCARDIOGRAM COMPLETE: CPT | Performed by: NURSE PRACTITIONER

## 2020-10-26 PROCEDURE — 3008F BODY MASS INDEX DOCD: CPT | Performed by: NURSE PRACTITIONER

## 2020-10-26 RX ORDER — AMMONIUM LACTATE 12 G/100G
CREAM TOPICAL
COMMUNITY
Start: 2020-08-20

## 2020-11-06 ENCOUNTER — TELEPHONE (OUTPATIENT)
Dept: GASTROENTEROLOGY | Facility: AMBULARY SURGERY CENTER | Age: 60
End: 2020-11-06

## 2020-12-15 ENCOUNTER — OFFICE VISIT (OUTPATIENT)
Dept: GYNECOLOGY | Facility: CLINIC | Age: 60
End: 2020-12-15
Payer: COMMERCIAL

## 2020-12-15 VITALS
WEIGHT: 269 LBS | DIASTOLIC BLOOD PRESSURE: 78 MMHG | SYSTOLIC BLOOD PRESSURE: 120 MMHG | BODY MASS INDEX: 37.66 KG/M2 | HEIGHT: 71 IN

## 2020-12-15 DIAGNOSIS — Z01.419 ENCOUNTER FOR GYNECOLOGICAL EXAMINATION WITH PAPANICOLAOU SMEAR OF CERVIX: ICD-10-CM

## 2020-12-15 DIAGNOSIS — Z13.820 SCREENING FOR OSTEOPOROSIS: ICD-10-CM

## 2020-12-15 DIAGNOSIS — Z01.419 ENCOUNTER FOR GYNECOLOGICAL EXAMINATION WITHOUT ABNORMAL FINDING: Primary | ICD-10-CM

## 2020-12-15 PROCEDURE — G0143 SCR C/V CYTO,THINLAYER,RESCR: HCPCS | Performed by: OBSTETRICS & GYNECOLOGY

## 2020-12-15 PROCEDURE — 76977 US BONE DENSITY MEASURE: CPT | Performed by: OBSTETRICS & GYNECOLOGY

## 2020-12-15 PROCEDURE — S0610 ANNUAL GYNECOLOGICAL EXAMINA: HCPCS | Performed by: OBSTETRICS & GYNECOLOGY

## 2020-12-15 PROCEDURE — 3008F BODY MASS INDEX DOCD: CPT | Performed by: NURSE PRACTITIONER

## 2020-12-15 RX ORDER — PREDNISOLONE ACETATE 10 MG/ML
SUSPENSION/ DROPS OPHTHALMIC
COMMUNITY
Start: 2020-11-20 | End: 2021-03-26 | Stop reason: ALTCHOICE

## 2020-12-15 RX ORDER — LANOLIN ALCOHOL/MO/W.PET/CERES
1 CREAM (GRAM) TOPICAL 3 TIMES DAILY
COMMUNITY

## 2020-12-15 RX ORDER — BROMFENAC 1.03 MG/ML
SOLUTION/ DROPS OPHTHALMIC
COMMUNITY
Start: 2020-11-23 | End: 2021-03-26 | Stop reason: ALTCHOICE

## 2020-12-15 RX ORDER — OFLOXACIN 3 MG/ML
SOLUTION/ DROPS OPHTHALMIC
COMMUNITY
Start: 2020-11-20 | End: 2021-03-26 | Stop reason: ALTCHOICE

## 2020-12-17 LAB
LAB AP GYN PRIMARY INTERPRETATION: NORMAL
Lab: NORMAL

## 2021-01-05 ENCOUNTER — OFFICE VISIT (OUTPATIENT)
Dept: GASTROENTEROLOGY | Facility: AMBULARY SURGERY CENTER | Age: 61
End: 2021-01-05
Payer: COMMERCIAL

## 2021-01-05 VITALS
HEIGHT: 71 IN | DIASTOLIC BLOOD PRESSURE: 72 MMHG | WEIGHT: 274.6 LBS | BODY MASS INDEX: 38.44 KG/M2 | SYSTOLIC BLOOD PRESSURE: 126 MMHG

## 2021-01-05 DIAGNOSIS — K21.9 GASTROESOPHAGEAL REFLUX DISEASE, UNSPECIFIED WHETHER ESOPHAGITIS PRESENT: ICD-10-CM

## 2021-01-05 DIAGNOSIS — Z12.11 COLON CANCER SCREENING: Primary | ICD-10-CM

## 2021-01-05 PROCEDURE — 99244 OFF/OP CNSLTJ NEW/EST MOD 40: CPT | Performed by: INTERNAL MEDICINE

## 2021-01-05 PROCEDURE — 1036F TOBACCO NON-USER: CPT | Performed by: INTERNAL MEDICINE

## 2021-01-05 PROCEDURE — 3008F BODY MASS INDEX DOCD: CPT | Performed by: INTERNAL MEDICINE

## 2021-01-05 NOTE — LETTER
January 5, 2021     Lorren Sever, [address] [place] [facility] [address]    Patient: Rohini Jiang   YOB: 1960   Date of Visit: 1/5/2021       Dear Dr Vigil Search:    Thank you for referring Jonijorge Sweeneyal to me for evaluation  Below are my notes for this consultation  If you have questions, please do not hesitate to call me  I look forward to following your patient along with you  Sincerely,        Jocelyn Millan MD        CC: No Recipients  Jocelyn Millan MD  1/5/2021  5:13 PM  Sign when Signing Visit  Consultation - [address] [facility]  Rohini Jiang 61 y o  female MRN: [id number]  Unit/Bed#:  Encounter: [id number]        Consults    ASSESSMENT/PLAN:     1  GERD- longstanding symptoms, possibly aggravated by underlying hiatal hernia or decreased LES pressure  Given chronicity of symptoms, would recommend EGD to assess for Quintanilla's  If there is no evidence of Quintanilla's, can titrate off of a PPI otherwise would recommend daily use of PPI to prevent progression Quintanilla's  - for now, can continue on nexium 20 mg every other day    -follow anti-reflux measures including avoiding eating late at night, avoiding acidic foods, avoid tight-fitting clothing  Patient was explained about the lifestyle and dietary modifications  Advised to avoid fatty foods, chocolates, caffeine, alcohol and any other triggering foods  Avoid eating for at least 3 hours before going to bed  2  CRC screening:  Average risk, no previous colonoscopy, no family history of colon cancer or change in bowel habits   -would recommend average risk screening colonoscopy at this time  Patient was explained about  the risks and benefits of the procedure  Risks including but not limited to bleeding, infection, perforation were explained in detail   Also explained about less than 100% sensitivity with the exam and other alternatives  ______________________________________________________________________    Reason for Consult / Principal Problem: [unfilled]    HPI: Edwin Corbin is a 61y o  year old female with longstanding history of GERD, has been on Nexium for a long time, currently taking Nexium 20 mg every other day presents for colon cancer screening evaluation  Patient reports that she has never had a colonoscopy  She denies any family history of colon cancer  No change in bowel habits or hematochezia  No abdominal pain or unintentional weight loss  She denies dysphagia, odynophagia, loss appetite or early satiety  She has not had an EGD in the past   She is not a smoker and drinks socially only  Labs are reviewed, her liver functions were normal in 2019, creatinine was normal at 0 99, hemoglobin was normal at 13, platelets 284  Review of Systems: The remainder of the review of systems was negative except for the pertinent positives noted in HPI       Historical Information   Past Medical History:   Diagnosis Date    Back pain     Bradycardia     DDD (degenerative disc disease), lumbar     DJD (degenerative joint disease)     Female infertility     GERD (gastroesophageal reflux disease)     Hypercholesterolemia     Obesity     Uveitis     Vertigo      Past Surgical History:   Procedure Laterality Date    DILATION AND CURETTAGE OF UTERUS      HYSTEROSCOPY  01/09/2012    MYOMECTOMY      TONSILLECTOMY AND ADENOIDECTOMY       Social History   Social History     Substance and Sexual Activity   Alcohol Use Yes    Frequency: Monthly or less    Drinks per session: 1 or 2    Binge frequency: Never    Comment: socially     Social History     Substance and Sexual Activity   Drug Use Never     Social History     Tobacco Use   Smoking Status Never Smoker   Smokeless Tobacco Never Used     Family History   Problem Relation Age of Onset    Hypertension Mother     Heart murmur Father     Hyperlipidemia Family     Diabetes Sister        Meds/Allergies     (Not in a hospital admission)    No current facility-administered medications for this visit  Allergies   Allergen Reactions    Neomycin-Bacitracin Zn-Polymyx Rash       Objective     Blood pressure 126/72, height 5' 10 5" (1 791 m), weight 125 kg (274 lb 9 6 oz)  [unfilled]    PHYSICAL EXAM     GEN: well nourished, well developed, no acute distress  HEENT: anicteric, MMM, no cervical or supraclavicular lymphadenopathy  CV: RRR, no m/r/g  CHEST: CTA b/l, no WRR  ABD: +BS, soft, NT/ND, no hepatosplenomegaly  EXT: no c/c/e  SKIN: no rashes,  NEURO: aaox3    Lab Results:   No visits with results within 1 Day(s) from this visit  Latest known visit with results is:   Office Visit on 12/15/2020   Component Date Value    Case Report 12/15/2020                      Value:Gynecologic Cytology Report                       Case: [id number]                                  Authorizing Provider:  Kai Max DO      Collected:           12/15/2020 1425              Ordering Location:     [address] [place] [address] For        Received:            12/15/2020 1425                                     [facility]                                                    First Screen:          Ana Page, CT                                                    Specimen:    LIQUID-BASED PAP, SCREENING, Cervix                                                        Primary Interpretation 12/15/2020 Negative for intraepithelial lesion or malignancy     Specimen Adequacy 12/15/2020 Satisfactory for evaluation  Endocervical/transformation zone component present   Additional Information 12/15/2020                      Value: This result contains rich text formatting which cannot be displayed here       Imaging Studies: I have personally reviewed pertinent films in PACS

## 2021-01-05 NOTE — PROGRESS NOTES
Consultation - [facility]  Shona Jimenez 61 y o  female MRN: [id number]  Unit/Bed#:  Encounter: [id number]        Consults    ASSESSMENT/PLAN:     1  GERD- longstanding symptoms, possibly aggravated by underlying hiatal hernia or decreased LES pressure  Given chronicity of symptoms, would recommend EGD to assess for Quintanilla's  If there is no evidence of Quintanilla's, can titrate off of a PPI otherwise would recommend daily use of PPI to prevent progression Quintanilla's  - for now, can continue on nexium 20 mg every other day    -follow anti-reflux measures including avoiding eating late at night, avoiding acidic foods, avoid tight-fitting clothing  Patient was explained about the lifestyle and dietary modifications  Advised to avoid fatty foods, chocolates, caffeine, alcohol and any other triggering foods  Avoid eating for at least 3 hours before going to bed  2  CRC screening:  Average risk, no previous colonoscopy, no family history of colon cancer or change in bowel habits   -would recommend average risk screening colonoscopy at this time  Patient was explained about  the risks and benefits of the procedure  Risks including but not limited to bleeding, infection, perforation were explained in detail  Also explained about less than 100% sensitivity with the exam and other alternatives  ______________________________________________________________________    Reason for Consult / Principal Problem: [unfilled]    HPI: Shona Jimenez is a 61y o  year old female with longstanding history of GERD, has been on Nexium for a long time, currently taking Nexium 20 mg every other day presents for colon cancer screening evaluation  Patient reports that she has never had a colonoscopy  She denies any family history of colon cancer  No change in bowel habits or hematochezia  No abdominal pain or unintentional weight loss    She denies dysphagia, odynophagia, loss appetite or early satiety  She has not had an EGD in the past   She is not a smoker and drinks socially only  Labs are reviewed, her liver functions were normal in 2019, creatinine was normal at 0 99, hemoglobin was normal at 13, platelets 143  Review of Systems: The remainder of the review of systems was negative except for the pertinent positives noted in HPI  Historical Information   Past Medical History:   Diagnosis Date    Back pain     Bradycardia     DDD (degenerative disc disease), lumbar     DJD (degenerative joint disease)     Female infertility     GERD (gastroesophageal reflux disease)     Hypercholesterolemia     Obesity     Uveitis     Vertigo      Past Surgical History:   Procedure Laterality Date    DILATION AND CURETTAGE OF UTERUS      HYSTEROSCOPY  01/09/2012    MYOMECTOMY      TONSILLECTOMY AND ADENOIDECTOMY       Social History   Social History     Substance and Sexual Activity   Alcohol Use Yes    Frequency: Monthly or less    Drinks per session: 1 or 2    Binge frequency: Never    Comment: socially     Social History     Substance and Sexual Activity   Drug Use Never     Social History     Tobacco Use   Smoking Status Never Smoker   Smokeless Tobacco Never Used     Family History   Problem Relation Age of Onset    Hypertension Mother     Heart murmur Father     Hyperlipidemia Family     Diabetes Sister        Meds/Allergies     (Not in a hospital admission)    No current facility-administered medications for this visit  Allergies   Allergen Reactions    Neomycin-Bacitracin Zn-Polymyx Rash       Objective     Blood pressure 126/72, height 5' 10 5" (1 791 m), weight 125 kg (274 lb 9 6 oz)      [unfilled]    PHYSICAL EXAM     GEN: well nourished, well developed, no acute distress  HEENT: anicteric, MMM, no cervical or supraclavicular lymphadenopathy  CV: RRR, no m/r/g  CHEST: CTA b/l, no WRR  ABD: +BS, soft, NT/ND, no hepatosplenomegaly  EXT: no c/c/e  SKIN: no rashes,  NEURO: aaox3    Lab Results:   No visits with results within 1 Day(s) from this visit  Latest known visit with results is:   Office Visit on 12/15/2020   Component Date Value    Case Report 12/15/2020                      Value:Gynecologic Cytology Report                       Case: [id number]                                  Authorizing Provider:  Kira Serrato DO      Collected:           12/15/2020 1427              Ordering Location:     [facility] For        Received:            12/15/2020 1425                                     [facility]                                                    First Screen:          REY David                                                    Specimen:    LIQUID-BASED PAP, SCREENING, Cervix                                                        Primary Interpretation 12/15/2020 Negative for intraepithelial lesion or malignancy     Specimen Adequacy 12/15/2020 Satisfactory for evaluation  Endocervical/transformation zone component present   Additional Information 12/15/2020                      Value: This result contains rich text formatting which cannot be displayed here       Imaging Studies: I have personally reviewed pertinent films in PACS

## 2021-01-14 ENCOUNTER — VBI (OUTPATIENT)
Dept: ADMINISTRATIVE | Facility: OTHER | Age: 61
End: 2021-01-14

## 2021-03-09 ENCOUNTER — HOSPITAL ENCOUNTER (OUTPATIENT)
Dept: MAMMOGRAPHY | Facility: HOSPITAL | Age: 61
Discharge: HOME | End: 2021-03-09
Payer: COMMERCIAL

## 2021-03-09 VITALS — WEIGHT: 274 LBS | BODY MASS INDEX: 38.36 KG/M2 | HEIGHT: 71 IN

## 2021-03-09 DIAGNOSIS — Z12.31 SCREENING MAMMOGRAM, ENCOUNTER FOR: ICD-10-CM

## 2021-03-09 PROCEDURE — 77067 SCR MAMMO BI INCL CAD: CPT

## 2021-03-09 PROCEDURE — 77063 BREAST TOMOSYNTHESIS BI: CPT

## 2021-03-12 ENCOUNTER — ANESTHESIA EVENT (OUTPATIENT)
Dept: GASTROENTEROLOGY | Facility: AMBULARY SURGERY CENTER | Age: 61
End: 2021-03-12

## 2021-03-26 ENCOUNTER — ANESTHESIA (OUTPATIENT)
Dept: GASTROENTEROLOGY | Facility: AMBULARY SURGERY CENTER | Age: 61
End: 2021-03-26

## 2021-03-26 ENCOUNTER — HOSPITAL ENCOUNTER (OUTPATIENT)
Dept: GASTROENTEROLOGY | Facility: AMBULARY SURGERY CENTER | Age: 61
Setting detail: OUTPATIENT SURGERY
Discharge: HOME | End: 2021-03-26
Attending: INTERNAL MEDICINE | Admitting: INTERNAL MEDICINE
Payer: COMMERCIAL

## 2021-03-26 VITALS
HEART RATE: 67 BPM | BODY MASS INDEX: 36.94 KG/M2 | HEIGHT: 70 IN | OXYGEN SATURATION: 98 % | SYSTOLIC BLOOD PRESSURE: 100 MMHG | DIASTOLIC BLOOD PRESSURE: 61 MMHG | TEMPERATURE: 97 F | RESPIRATION RATE: 18 BRPM | WEIGHT: 258 LBS

## 2021-03-26 DIAGNOSIS — K20.90 ESOPHAGITIS: Primary | ICD-10-CM

## 2021-03-26 DIAGNOSIS — Z12.11 COLON CANCER SCREENING: ICD-10-CM

## 2021-03-26 DIAGNOSIS — K21.9 GASTROESOPHAGEAL REFLUX DISEASE, UNSPECIFIED WHETHER ESOPHAGITIS PRESENT: ICD-10-CM

## 2021-03-26 PROCEDURE — 88305 TISSUE EXAM BY PATHOLOGIST: CPT | Performed by: PATHOLOGY

## 2021-03-26 PROCEDURE — 45385 COLONOSCOPY W/LESION REMOVAL: CPT | Performed by: INTERNAL MEDICINE

## 2021-03-26 PROCEDURE — 43239 EGD BIOPSY SINGLE/MULTIPLE: CPT | Performed by: INTERNAL MEDICINE

## 2021-03-26 RX ORDER — PANTOPRAZOLE SODIUM 40 MG/1
40 TABLET, DELAYED RELEASE ORAL
Qty: 180 TABLET | Refills: 0 | Status: SHIPPED | OUTPATIENT
Start: 2021-03-26 | End: 2021-05-24 | Stop reason: SDUPTHER

## 2021-03-26 RX ORDER — PROPOFOL 10 MG/ML
INJECTION, EMULSION INTRAVENOUS AS NEEDED
Status: DISCONTINUED | OUTPATIENT
Start: 2021-03-26 | End: 2021-03-26

## 2021-03-26 RX ORDER — GLYCOPYRROLATE 0.2 MG/ML
INJECTION INTRAMUSCULAR; INTRAVENOUS AS NEEDED
Status: DISCONTINUED | OUTPATIENT
Start: 2021-03-26 | End: 2021-03-26

## 2021-03-26 RX ORDER — LIDOCAINE HYDROCHLORIDE 10 MG/ML
0.5 INJECTION, SOLUTION EPIDURAL; INFILTRATION; INTRACAUDAL; PERINEURAL ONCE AS NEEDED
Status: DISCONTINUED | OUTPATIENT
Start: 2021-03-26 | End: 2021-03-30 | Stop reason: HOSPADM

## 2021-03-26 RX ORDER — SODIUM CHLORIDE, SODIUM LACTATE, POTASSIUM CHLORIDE, CALCIUM CHLORIDE 600; 310; 30; 20 MG/100ML; MG/100ML; MG/100ML; MG/100ML
125 INJECTION, SOLUTION INTRAVENOUS CONTINUOUS
Status: DISCONTINUED | OUTPATIENT
Start: 2021-03-26 | End: 2021-03-30 | Stop reason: HOSPADM

## 2021-03-26 RX ORDER — LIDOCAINE HYDROCHLORIDE 20 MG/ML
INJECTION, SOLUTION EPIDURAL; INFILTRATION; INTRACAUDAL; PERINEURAL AS NEEDED
Status: DISCONTINUED | OUTPATIENT
Start: 2021-03-26 | End: 2021-03-26

## 2021-03-26 RX ORDER — SODIUM CHLORIDE, SODIUM LACTATE, POTASSIUM CHLORIDE, CALCIUM CHLORIDE 600; 310; 30; 20 MG/100ML; MG/100ML; MG/100ML; MG/100ML
INJECTION, SOLUTION INTRAVENOUS CONTINUOUS PRN
Status: DISCONTINUED | OUTPATIENT
Start: 2021-03-26 | End: 2021-03-26

## 2021-03-26 RX ORDER — FENTANYL CITRATE 50 UG/ML
INJECTION, SOLUTION INTRAMUSCULAR; INTRAVENOUS AS NEEDED
Status: DISCONTINUED | OUTPATIENT
Start: 2021-03-26 | End: 2021-03-26

## 2021-03-26 RX ADMIN — PROPOFOL 30 MG: 10 INJECTION, EMULSION INTRAVENOUS at 09:41

## 2021-03-26 RX ADMIN — SODIUM CHLORIDE, SODIUM LACTATE, POTASSIUM CHLORIDE, AND CALCIUM CHLORIDE: .6; .31; .03; .02 INJECTION, SOLUTION INTRAVENOUS at 08:48

## 2021-03-26 RX ADMIN — PROPOFOL 150 MG: 10 INJECTION, EMULSION INTRAVENOUS at 08:50

## 2021-03-26 RX ADMIN — PROPOFOL 10 MG: 10 INJECTION, EMULSION INTRAVENOUS at 09:13

## 2021-03-26 RX ADMIN — PROPOFOL 20 MG: 10 INJECTION, EMULSION INTRAVENOUS at 09:29

## 2021-03-26 RX ADMIN — PROPOFOL 30 MG: 10 INJECTION, EMULSION INTRAVENOUS at 08:53

## 2021-03-26 RX ADMIN — PROPOFOL 20 MG: 10 INJECTION, EMULSION INTRAVENOUS at 09:27

## 2021-03-26 RX ADMIN — PROPOFOL 20 MG: 10 INJECTION, EMULSION INTRAVENOUS at 09:34

## 2021-03-26 RX ADMIN — PROPOFOL 20 MG: 10 INJECTION, EMULSION INTRAVENOUS at 09:38

## 2021-03-26 RX ADMIN — PROPOFOL 10 MG: 10 INJECTION, EMULSION INTRAVENOUS at 09:22

## 2021-03-26 RX ADMIN — PROPOFOL 50 MG: 10 INJECTION, EMULSION INTRAVENOUS at 08:55

## 2021-03-26 RX ADMIN — PROPOFOL 20 MG: 10 INJECTION, EMULSION INTRAVENOUS at 09:31

## 2021-03-26 RX ADMIN — SODIUM CHLORIDE, SODIUM LACTATE, POTASSIUM CHLORIDE, AND CALCIUM CHLORIDE 125 ML/HR: .6; .31; .03; .02 INJECTION, SOLUTION INTRAVENOUS at 08:38

## 2021-03-26 RX ADMIN — PROPOFOL 50 MG: 10 INJECTION, EMULSION INTRAVENOUS at 09:03

## 2021-03-26 RX ADMIN — FENTANYL CITRATE 25 MCG: 50 INJECTION, SOLUTION INTRAMUSCULAR; INTRAVENOUS at 09:10

## 2021-03-26 RX ADMIN — PROPOFOL 30 MG: 10 INJECTION, EMULSION INTRAVENOUS at 09:20

## 2021-03-26 RX ADMIN — PROPOFOL 20 MG: 10 INJECTION, EMULSION INTRAVENOUS at 09:23

## 2021-03-26 RX ADMIN — GLYCOPYRROLATE 0.1 MG: 0.2 INJECTION, SOLUTION INTRAMUSCULAR; INTRAVENOUS at 09:10

## 2021-03-26 RX ADMIN — PROPOFOL 30 MG: 10 INJECTION, EMULSION INTRAVENOUS at 09:33

## 2021-03-26 RX ADMIN — PROPOFOL 20 MG: 10 INJECTION, EMULSION INTRAVENOUS at 09:25

## 2021-03-26 RX ADMIN — PROPOFOL 20 MG: 10 INJECTION, EMULSION INTRAVENOUS at 09:18

## 2021-03-26 RX ADMIN — Medication 40 MG: at 08:59

## 2021-03-26 RX ADMIN — PROPOFOL 20 MG: 10 INJECTION, EMULSION INTRAVENOUS at 08:51

## 2021-03-26 RX ADMIN — PROPOFOL 50 MG: 10 INJECTION, EMULSION INTRAVENOUS at 08:57

## 2021-03-26 RX ADMIN — PROPOFOL 20 MG: 10 INJECTION, EMULSION INTRAVENOUS at 09:10

## 2021-03-26 RX ADMIN — LIDOCAINE HYDROCHLORIDE 100 MG: 20 INJECTION, SOLUTION EPIDURAL; INFILTRATION; INTRACAUDAL at 08:50

## 2021-03-26 RX ADMIN — PROPOFOL 20 MG: 10 INJECTION, EMULSION INTRAVENOUS at 09:15

## 2021-03-26 RX ADMIN — PROPOFOL 30 MG: 10 INJECTION, EMULSION INTRAVENOUS at 09:45

## 2021-03-26 NOTE — DISCHARGE INSTRUCTIONS
Colonoscopy   WHAT YOU NEED TO KNOW:   A colonoscopy is a procedure to examine the inside of your colon (intestine) with a scope  Polyps or tissue growths may have been removed during your colonoscopy  It is normal to feel bloated and to have some abdominal discomfort  You should be passing gas  If you have hemorrhoids or you had polyps removed, you may have a small amount of bleeding  DISCHARGE INSTRUCTIONS:   Seek care immediately if:    You have sudden, severe abdominal pain   You have problems swallowing   You have a large amount of black, sticky bowel movements or blood in your bowel movements   You have sudden trouble breathing   You feel weak, lightheaded, or faint or your heart beats faster than normal for you  Contact your healthcare provider at [phone] if:    You have a fever and chills   You have nausea or are vomiting   Your abdomen is bloated or feels full and hard   You have abdominal pain   You have a large amount of black, sticky bowel movements or blood in your bowel movements   You have not had a bowel movement for 3 days after your procedure   You have rash or hives   You have questions or concerns about your procedure  Activity:    Do not lift, strain, or run for 24 hours after your procedure   Rest after your procedure  You have been given medicine to relax you  Do not drive or make important decisions until the day after your procedure  Return to your normal activity as directed   Relieve gas and discomfort from bloating by lying on your right side with a heating pad on your abdomen  You may need to take short walks to help the gas move out  Eat small meals until bloating is relieved  Follow up with your healthcare provider as directed: Write down your questions so you remember to ask them during your visits       If you take a blood thinner, please review the specific instructions from your endoscopist about when you should resume it  These can be found in the Recommendation and Your Medication list sections of this After Visit Summary   egd    Upper Endoscopy   WHAT YOU NEED TO KNOW:   An upper endoscopy is also called an upper gastrointestinal (GI) endoscopy, or an esophagogastroduodenoscopy (EGD)  You may feel bloated, gassy, or have some abdominal discomfort after your procedure  Your throat may be sore for 24 to 36 hours  You may burp or pass gas from air that is still inside your body  DISCHARGE INSTRUCTIONS:   Call 911 for any of the following:   · You have sudden chest pain or trouble breathing  Seek care immediately if:   · You feel dizzy or faint  · You have trouble swallowing  · Your bowel movements are very dark or black  · Your abdomen is hard and firm and you have severe pain  · You vomit blood  Contact your healthcare provider if:   · You feel full or bloated and cannot burp or pass gas  · You have not had a bowel movement for 3 days after your procedure  · You have neck pain  · You have a fever or chills  · You have nausea or are vomiting  · You have a rash or hives  · You have questions or concerns about your endoscopy  Relieve a sore throat:  Suck on throat lozenges or crushed ice  Gargle with a small amount of warm salt water  Mix 1 teaspoon of salt and 1 cup of warm water to make salt water  Relieve gas and discomfort from bloating:  Lie on your right side with a heating pad on your abdomen  Take short walks to help pass gas  Eat small meals until bloating is relieved  Rest after your procedure: You have been given medicine to relax you  Do not  drive or make important decisions until the day after your procedure  Return to your normal activity as directed  You can usually return to work the day after your procedure  Follow up with your healthcare provider as directed:  Write down your questions so you remember to ask them during your visits     © 2017 [facility] [address] is for End User's use only and may not be sold, redistributed or otherwise used for commercial purposes  All illustrations and images included in CareNotes® are the copyrighted property of A D A M , Inc  or Gilles Spear  The above information is an  only  It is not intended as medical advice for individual conditions or treatments  Talk to your doctor, nurse or pharmacist before following any medical regimen to see if it is safe and effective for you

## 2021-03-26 NOTE — ANESTHESIA POSTPROCEDURE EVALUATION
Post-Op Assessment Note    CV Status:  Stable  Pain Score: 0    Pain management: adequate     Mental Status:  Alert and awake   Hydration Status:  Euvolemic   PONV Controlled:  Controlled   Airway Patency:  Patent and adequate      Post Op Vitals Reviewed: Yes      Staff: CRNA         No complications documented      BP   95/62   Temp      Pulse  74   Resp   20   SpO2   98

## 2021-03-26 NOTE — H&P
History and Physical -  [facility]  Edmundo Espinosa 61 y o  female MRN: [id number]    HPI: Edmundo Espinosa is a 61y o  year old female who presents for evaluation of acid reflux and colon cancer screening  Review of Systems    Historical Information   Past Medical History:   Diagnosis Date    Back pain     Bradycardia     DDD (degenerative disc disease), lumbar     DJD (degenerative joint disease)     Female infertility     GERD (gastroesophageal reflux disease)     Hypercholesterolemia     Obesity     Uveitis     Vertigo      Past Surgical History:   Procedure Laterality Date    CATARACT EXTRACTION, BILATERAL      DILATION AND CURETTAGE OF UTERUS      HYSTEROSCOPY  01/09/2012    MYOMECTOMY      TONSILLECTOMY AND ADENOIDECTOMY       Social History   Social History     Substance and Sexual Activity   Alcohol Use Yes    Frequency: Monthly or less    Drinks per session: 1 or 2    Binge frequency: Never    Comment: socially     Social History     Substance and Sexual Activity   Drug Use Never     Social History     Tobacco Use   Smoking Status Never Smoker   Smokeless Tobacco Never Used     Family History   Problem Relation Age of Onset    Hypertension Mother     Heart murmur Father     Hyperlipidemia Family     Diabetes Sister     Breast cancer Paternal Aunt        Meds/Allergies     (Not in a hospital admission)      Allergies   Allergen Reactions    Neomycin-Bacitracin Zn-Polymyx Rash       Objective     /81   Pulse 74   Temp 97 8 °F (36 6 °C) (Temporal)   Resp 18   Ht 5' 10" (1 778 m)   Wt 117 kg (258 lb)   SpO2 96%   BMI 37 02 kg/m²       PHYSICAL EXAM    Gen: NAD  CV: RRR  CHEST: Clear  ABD: soft, NT/ND  EXT: no edema  Neuro: AAO      ASSESSMENT/PLAN:  This is a 61y o  year old female here for GERD and colon cancer screening  PLAN:   Procedure:  EGD and colonoscopy

## 2021-03-26 NOTE — ANESTHESIA PREPROCEDURE EVALUATION
Procedure:  COLONOSCOPY  EGD    Relevant Problems   CARDIO   (+) Breast pain   (+) Mixed hyperlipidemia      GI/HEPATIC   (+) Esophageal reflux      Other   (+) Colon cancer screening   (+) Dysfunctional uterine bleeding   (+) Obesity, Class II, BMI 35-39 9        Physical Exam    Airway    Mallampati score: II  TM Distance: >3 FB  Neck ROM: full     Dental   No notable dental hx     Cardiovascular  Rhythm: regular, Rate: normal, Cardiovascular exam normal    Pulmonary  Pulmonary exam normal Breath sounds clear to auscultation,     Other Findings        Anesthesia Plan  ASA Score- 2     Anesthesia Type- IV sedation with anesthesia with ASA Monitors  Additional Monitors:   Airway Plan:           Plan Factors-Exercise tolerance (METS): >4 METS  Chart reviewed  EKG reviewed  Existing labs reviewed  Patient summary reviewed  Patient is not a current smoker  Induction-     Postoperative Plan-     Informed Consent- Anesthetic plan and risks discussed with patient  I personally reviewed this patient with the CRNA  Discussed and agreed on the Anesthesia Plan with the CRNA  Lynnette Fine

## 2021-03-30 DIAGNOSIS — Z23 ENCOUNTER FOR IMMUNIZATION: ICD-10-CM

## 2021-04-01 ENCOUNTER — IMMUNIZATIONS (OUTPATIENT)
Dept: FAMILY MEDICINE CLINIC | Facility: HOSPITAL | Age: 61
End: 2021-04-01

## 2021-04-01 DIAGNOSIS — Z23 ENCOUNTER FOR IMMUNIZATION: Primary | ICD-10-CM

## 2021-04-01 PROCEDURE — 0001A SARS-COV-2 / COVID-19 MRNA VACCINE (PFIZER-BIONTECH) 30 MCG: CPT

## 2021-04-01 PROCEDURE — 91300 SARS-COV-2 / COVID-19 MRNA VACCINE (PFIZER-BIONTECH) 30 MCG: CPT

## 2021-04-22 ENCOUNTER — IMMUNIZATIONS (OUTPATIENT)
Dept: FAMILY MEDICINE CLINIC | Facility: HOSPITAL | Age: 61
End: 2021-04-22

## 2021-04-22 DIAGNOSIS — Z23 ENCOUNTER FOR IMMUNIZATION: Primary | ICD-10-CM

## 2021-04-22 PROCEDURE — 0002A SARS-COV-2 / COVID-19 MRNA VACCINE (PFIZER-BIONTECH) 30 MCG: CPT | Performed by: PHYSICIAN ASSISTANT

## 2021-04-22 PROCEDURE — 91300 SARS-COV-2 / COVID-19 MRNA VACCINE (PFIZER-BIONTECH) 30 MCG: CPT | Performed by: PHYSICIAN ASSISTANT

## 2021-05-17 DIAGNOSIS — E78.2 MIXED HYPERLIPIDEMIA: ICD-10-CM

## 2021-05-17 RX ORDER — ATORVASTATIN CALCIUM 20 MG/1
TABLET, FILM COATED ORAL
Qty: 90 TABLET | Refills: 3 | Status: SHIPPED | OUTPATIENT
Start: 2021-05-17 | End: 2022-06-04

## 2021-05-24 ENCOUNTER — TELEPHONE (OUTPATIENT)
Dept: GASTROENTEROLOGY | Facility: AMBULARY SURGERY CENTER | Age: 61
End: 2021-05-24

## 2021-05-24 DIAGNOSIS — K20.90 ESOPHAGITIS: ICD-10-CM

## 2021-05-24 RX ORDER — PANTOPRAZOLE SODIUM 40 MG/1
40 TABLET, DELAYED RELEASE ORAL
Qty: 180 TABLET | Refills: 1 | Status: SHIPPED | OUTPATIENT
Start: 2021-05-24 | End: 2021-11-24

## 2021-05-24 NOTE — TELEPHONE ENCOUNTER
GI Physician: Dr Mady White for Medication: Protonix     Dose: 40mg bid     Quantity:   90 day supply with refills   Pharmacy and Location: [facility] [address]

## 2021-05-24 NOTE — TELEPHONE ENCOUNTER
Patient is scheduled for EGD on August 17 , 2021 at [facility]  with Olivia Molina MD  Patient is aware of pre-procedure prep of npo after mn and they will be called the day prior between 2 and 6 pm for time to report for procedure

## 2021-08-16 ENCOUNTER — ANESTHESIA EVENT (OUTPATIENT)
Dept: ANESTHESIOLOGY | Facility: HOSPITAL | Age: 61
End: 2021-08-16

## 2021-08-16 ENCOUNTER — ANESTHESIA (OUTPATIENT)
Dept: ANESTHESIOLOGY | Facility: HOSPITAL | Age: 61
End: 2021-08-16

## 2021-08-16 ENCOUNTER — TELEPHONE (OUTPATIENT)
Dept: GASTROENTEROLOGY | Facility: AMBULARY SURGERY CENTER | Age: 61
End: 2021-08-16

## 2021-08-16 NOTE — ANESTHESIA PREPROCEDURE EVALUATION
Procedure:  PRE-OP ONLY    Relevant Problems   CARDIO   (+) Breast pain   (+) Mixed hyperlipidemia      GI/HEPATIC   (+) Esophageal reflux      Exercise Stress 2017:  -  Stress results: Duration of exercise was 6 min and 1 sec  Target heart rate was achieved  There was no chest pain during stress  -  ECG conclusions: The stress ECG was negative for ischemia      IMPRESSIONS: Normal study after maximal exercise without reproduction of symptoms  Lab Results   Component Value Date    WBC 4 8 10/29/2019    HGB 13 0 10/29/2019    HCT 39 5 10/29/2019    MCV 91 2 10/29/2019     10/29/2019     Lab Results   Component Value Date    SODIUM 141 10/29/2019    K 4 3 10/29/2019     10/29/2019    CO2 29 10/29/2019    BUN 15 10/29/2019    CREATININE 0 99 10/29/2019    GLUC 94 10/29/2019    CALCIUM 9 7 10/29/2019     No results found for: INR, PROTIME  Lab Results   Component Value Date    HGBA1C 5 8 (H) 10/29/2019                 Anesthesia Plan  ASA Score- 2     Anesthesia Type- IV sedation with anesthesia with ASA Monitors  Additional Monitors:   Airway Plan:           Plan Factors-    Chart reviewed  EKG reviewed  Existing labs reviewed  Patient summary reviewed  Induction- intravenous      Postoperative Plan-     Informed Consent-

## 2021-08-17 ENCOUNTER — ANESTHESIA EVENT (OUTPATIENT)
Dept: GASTROENTEROLOGY | Facility: AMBULARY SURGERY CENTER | Age: 61
End: 2021-08-17

## 2021-08-17 ENCOUNTER — ANESTHESIA (OUTPATIENT)
Dept: GASTROENTEROLOGY | Facility: AMBULARY SURGERY CENTER | Age: 61
End: 2021-08-17

## 2021-08-17 ENCOUNTER — HOSPITAL ENCOUNTER (OUTPATIENT)
Dept: GASTROENTEROLOGY | Facility: AMBULARY SURGERY CENTER | Age: 61
Setting detail: OUTPATIENT SURGERY
Discharge: HOME | End: 2021-08-17
Attending: INTERNAL MEDICINE
Payer: COMMERCIAL

## 2021-08-17 VITALS
OXYGEN SATURATION: 96 % | RESPIRATION RATE: 16 BRPM | WEIGHT: 264 LBS | HEIGHT: 70 IN | SYSTOLIC BLOOD PRESSURE: 113 MMHG | HEART RATE: 56 BPM | DIASTOLIC BLOOD PRESSURE: 58 MMHG | BODY MASS INDEX: 37.8 KG/M2 | TEMPERATURE: 97.3 F

## 2021-08-17 DIAGNOSIS — K20.90 ESOPHAGITIS: ICD-10-CM

## 2021-08-17 DIAGNOSIS — K21.9 GASTROESOPHAGEAL REFLUX DISEASE, UNSPECIFIED WHETHER ESOPHAGITIS PRESENT: ICD-10-CM

## 2021-08-17 DIAGNOSIS — K44.9 HIATAL HERNIA: Primary | ICD-10-CM

## 2021-08-17 PROCEDURE — 88305 TISSUE EXAM BY PATHOLOGIST: CPT | Performed by: PATHOLOGY

## 2021-08-17 PROCEDURE — 43239 EGD BIOPSY SINGLE/MULTIPLE: CPT | Performed by: INTERNAL MEDICINE

## 2021-08-17 RX ORDER — SODIUM CHLORIDE 9 MG/ML
INJECTION, SOLUTION INTRAVENOUS CONTINUOUS PRN
Status: DISCONTINUED | OUTPATIENT
Start: 2021-08-17 | End: 2021-08-17

## 2021-08-17 RX ORDER — PROPOFOL 10 MG/ML
INJECTION, EMULSION INTRAVENOUS AS NEEDED
Status: DISCONTINUED | OUTPATIENT
Start: 2021-08-17 | End: 2021-08-17

## 2021-08-17 RX ADMIN — PROPOFOL 100 MG: 10 INJECTION, EMULSION INTRAVENOUS at 11:06

## 2021-08-17 RX ADMIN — PROPOFOL 50 MG: 10 INJECTION, EMULSION INTRAVENOUS at 11:12

## 2021-08-17 RX ADMIN — LIDOCAINE HYDROCHLORIDE 50 MG: 20 INJECTION, SOLUTION INTRAVENOUS at 11:06

## 2021-08-17 RX ADMIN — PROPOFOL 50 MG: 10 INJECTION, EMULSION INTRAVENOUS at 11:10

## 2021-08-17 RX ADMIN — SODIUM CHLORIDE: 0.9 INJECTION, SOLUTION INTRAVENOUS at 11:03

## 2021-08-17 RX ADMIN — PROPOFOL 50 MG: 10 INJECTION, EMULSION INTRAVENOUS at 11:08

## 2021-08-17 NOTE — H&P
History and Physical -  [facility]  Krzysztof Schuster 61 y o  female MRN: [id number]    HPI: Krzysztof Schuster is a 61y o  year old female who presents for evaluation of Quintanilla's, had history of esophagitis  Review of Systems    Historical Information   Past Medical History:   Diagnosis Date    Back pain     Bradycardia     DDD (degenerative disc disease), lumbar     DJD (degenerative joint disease)     Female infertility     GERD (gastroesophageal reflux disease)     Hypercholesterolemia     Obesity     Uveitis     Vertigo      Past Surgical History:   Procedure Laterality Date    CATARACT EXTRACTION, BILATERAL      DILATION AND CURETTAGE OF UTERUS      HYSTEROSCOPY  01/09/2012    MYOMECTOMY      TONSILLECTOMY AND ADENOIDECTOMY       Social History   Social History     Substance and Sexual Activity   Alcohol Use Yes    Comment: socially     Social History     Substance and Sexual Activity   Drug Use Never     Social History     Tobacco Use   Smoking Status Never Smoker   Smokeless Tobacco Never Used     Family History   Problem Relation Age of Onset    Hypertension Mother     Heart murmur Father     Hyperlipidemia Family     Diabetes Sister     Breast cancer Paternal Aunt     Esophageal cancer Cousin        Meds/Allergies     (Not in a hospital admission)      Allergies   Allergen Reactions    Neomycin-Bacitracin Zn-Polymyx Rash       Objective     /85   Pulse 66   Temp 97 9 °F (36 6 °C) (Temporal)   Resp 18   Ht 5' 10" (1 778 m)   Wt 120 kg (264 lb)   SpO2 99%   BMI 37 88 kg/m²       PHYSICAL EXAM    Gen: NAD  CV: RRR  CHEST: Clear  ABD: soft, NT/ND  EXT: no edema  Neuro: AAO      ASSESSMENT/PLAN:  This is a 61y o  year old female here for evaluation of Quintanilla's, has history of esophagitis  PLAN:   Procedure:  EGD

## 2021-08-17 NOTE — ANESTHESIA POSTPROCEDURE EVALUATION
Post-Op Assessment Note    CV Status:  Stable  Pain Score: 0    Pain management: adequate     Mental Status:  Alert and awake   Hydration Status:  Euvolemic and stable   PONV Controlled:  Controlled   Airway Patency:  Patent and adequate      Post Op Vitals Reviewed: Yes      Staff: CRNA         No complications documented      BP      Temp (!) 97 3 °F (36 3 °C) (08/17/21 1117)    Pulse 61 (08/17/21 1117)   Resp 20 (08/17/21 1117)    SpO2 96 % (08/17/21 1117)

## 2021-08-17 NOTE — ANESTHESIA PREPROCEDURE EVALUATION
Procedure:  EGD    Relevant Problems   ANESTHESIA (within normal limits)      CARDIO   (+) Breast pain   (+) Mixed hyperlipidemia      ENDO (within normal limits)      GI/HEPATIC   (+) Esophageal reflux      /RENAL (within normal limits)      HEMATOLOGY (within normal limits)      NEURO/PSYCH (within normal limits)      Other   (+) Dysfunctional uterine bleeding   (+) Obesity, Class II, BMI 35-39 9      Exercise Stress 2017:  -  Stress results: Duration of exercise was 6 min and 1 sec  Target heart rate was achieved  There was no chest pain during stress  -  ECG conclusions: The stress ECG was negative for ischemia      IMPRESSIONS: Normal study after maximal exercise without reproduction of symptoms  Lab Results   Component Value Date    WBC 4 8 10/29/2019    HGB 13 0 10/29/2019    HCT 39 5 10/29/2019    MCV 91 2 10/29/2019     10/29/2019     Lab Results   Component Value Date    SODIUM 141 10/29/2019    K 4 3 10/29/2019     10/29/2019    CO2 29 10/29/2019    BUN 15 10/29/2019    CREATININE 0 99 10/29/2019    GLUC 94 10/29/2019    CALCIUM 9 7 10/29/2019     No results found for: INR, PROTIME  Lab Results   Component Value Date    HGBA1C 5 8 (H) 10/29/2019                   Anesthesia Plan  ASA Score- 2     Anesthesia Type- IV sedation with anesthesia with ASA Monitors  Additional Monitors:   Airway Plan:           Plan Factors-Exercise tolerance (METS): >4 METS  Chart reviewed  EKG reviewed  Existing labs reviewed  Patient summary reviewed  Induction- intravenous  Postoperative Plan-     Informed Consent- Anesthetic plan and risks discussed with patient  I personally reviewed this patient with the CRNA  Discussed and agreed on the Anesthesia Plan with the CRNA  Thomas Elena

## 2021-12-21 ENCOUNTER — IMMUNIZATIONS (OUTPATIENT)
Dept: FAMILY MEDICINE CLINIC | Facility: HOSPITAL | Age: 61
End: 2021-12-21

## 2021-12-21 DIAGNOSIS — Z23 ENCOUNTER FOR IMMUNIZATION: Primary | ICD-10-CM

## 2021-12-21 PROCEDURE — 91300 COVID-19 PFIZER VACC 0.3 ML: CPT | Performed by: PEDIATRICS

## 2021-12-21 PROCEDURE — 0001A COVID-19 PFIZER VACC 0.3 ML: CPT | Performed by: PEDIATRICS

## 2022-02-24 ENCOUNTER — TELEPHONE (OUTPATIENT)
Dept: GASTROENTEROLOGY | Facility: AMBULARY SURGERY CENTER | Age: 62
End: 2022-02-24

## 2022-04-20 ENCOUNTER — CONSULT (OUTPATIENT)
Dept: BARIATRICS | Facility: CLINIC | Age: 62
End: 2022-04-20
Payer: COMMERCIAL

## 2022-04-20 VITALS
DIASTOLIC BLOOD PRESSURE: 70 MMHG | SYSTOLIC BLOOD PRESSURE: 122 MMHG | BODY MASS INDEX: 38.85 KG/M2 | HEART RATE: 84 BPM | WEIGHT: 277.5 LBS | HEIGHT: 71 IN | TEMPERATURE: 97.4 F

## 2022-04-20 DIAGNOSIS — E66.9 OBESITY, CLASS II, BMI 35-39.9: ICD-10-CM

## 2022-04-20 DIAGNOSIS — K44.9 HIATAL HERNIA: Primary | ICD-10-CM

## 2022-04-20 PROCEDURE — 99203 OFFICE O/P NEW LOW 30 MIN: CPT | Performed by: SURGERY

## 2022-04-20 PROCEDURE — 3008F BODY MASS INDEX DOCD: CPT | Performed by: SURGERY

## 2022-04-20 PROCEDURE — 1036F TOBACCO NON-USER: CPT | Performed by: SURGERY

## 2022-04-20 NOTE — PROGRESS NOTES
OFFICE VISIT - BARIATRIC SURGERY  Nkechi Lopez 64 y o  female MRN: [id number]  Unit/Bed#:  Encounter: [id number]      HPI:  Nkechi Lopez is a 64 y o  female with complaints of heartburn for which recent EGD performed which shows hiatal hernia, referred by Dr Fatou Walker  Subjective     Patient complaining of heartburn symptoms including reflux for the past 7 years  She is taking protonix 40mg BID with some relief  She denies nausea and vomiting  She has also taken nexium in the past without relief  Her symptoms wake her up at night and notes weird noise deep in chest at night  She notes low acid diet does not help much with symptoms  She does not eat after 5-6pm to help symptoms  Tolerating diet: yes  Main symptoms: Heartburn, choking sensation  Worse with food: Yes  Nausea: Denies  Vomiting: Denies  Diarrhea: Denies  Duration of symptoms: 7 years  Smoking: Denies  Alcohol use: once every couple weeks  NSAID use: Ibuprofen PRN, cutting down   Caffeine use: Limiting, currently drinks 8oz coffee every other day  Current treatment: Protonix   Past medical history significant for: None  Previous cholecystectomy or pertinent abdominal surgeries: lap myomectomy  Previous EGD: 8/17/21 medium hiatal hernia  Previous Upper GI: none   Previous Manometry: None  Ambulation is not impaired  Review of Systems   Constitutional: Negative for chills and fever  HENT: Negative for ear pain and sore throat  Eyes: Negative for pain and visual disturbance  Respiratory: Negative for cough and shortness of breath  Cardiovascular: Negative for chest pain and palpitations  Gastrointestinal: Negative for abdominal pain and vomiting  Heartburn   Genitourinary: Negative for dysuria and hematuria  Musculoskeletal: Negative for arthralgias and back pain  Skin: Negative for color change and rash  Neurological: Negative for seizures and syncope  All other systems reviewed and are negative        Historical Information   Past Medical History:   Diagnosis Date    Back pain     Bradycardia     DDD (degenerative disc disease), lumbar     DJD (degenerative joint disease)     Female infertility     GERD (gastroesophageal reflux disease)     Hypercholesterolemia     Obesity     Uveitis     Vertigo      Past Surgical History:   Procedure Laterality Date    CATARACT EXTRACTION, BILATERAL      COLONOSCOPY      2021    DILATION AND CURETTAGE OF UTERUS      HYSTEROSCOPY  01/09/2012    MYOMECTOMY      TONSILLECTOMY AND ADENOIDECTOMY       Social History   Social History     Substance and Sexual Activity   Alcohol Use Yes    Comment: socially     Social History     Substance and Sexual Activity   Drug Use Never     Social History     Tobacco Use   Smoking Status Never Smoker   Smokeless Tobacco Never Used       Objective       Current Vitals:   Blood Pressure: 122/70 (04/20/22 1408)  Pulse: 84 (04/20/22 1408)  Temperature: (!) 97 4 °F (36 3 °C) (04/20/22 1408)  Temp Source: Tympanic (04/20/22 1408)  Height: 5' 10 5" (179 1 cm) (04/20/22 1408)  Weight - Scale: 126 kg (277 lb 8 oz) (04/20/22 1408)    Invasive Devices  Report    None                 Physical Exam  Constitutional:       Appearance: Normal appearance  She is obese  HENT:      Head: Normocephalic and atraumatic  Nose: Nose normal       Mouth/Throat:      Mouth: Mucous membranes are moist    Eyes:      Extraocular Movements: Extraocular movements intact  Pupils: Pupils are equal, round, and reactive to light  Cardiovascular:      Rate and Rhythm: Normal rate and regular rhythm  Pulses: Normal pulses  Heart sounds: Normal heart sounds  Pulmonary:      Effort: Pulmonary effort is normal       Breath sounds: Normal breath sounds  Abdominal:      Palpations: Abdomen is soft  Musculoskeletal:      Cervical back: Normal range of motion  Skin:     General: Skin is warm  Neurological:      General: No focal deficit present  Mental Status: She is alert and oriented to person, place, and time  Psychiatric:         Mood and Affect: Mood normal          Behavior: Behavior normal            Pathology, and Other Studies: I have personally reviewed pertinent reports  Assessment/PLAN:    Bell Law is a 64 y o  female with complaints of heartburn for which recent EGD performed which shows hiatal hernia, referred by Dr Vela Getting  Workup thus far reviewed and discussed with patient  Workup includes:         EGD (8/17/21)  [address]  [phone]        DATE OF SERVICE:  8/17/21     PHYSICIAN(S):  Geena Garza MD - Attending Physician        INDICATION:  Esophagitis, Gastroesophageal reflux disease, unspecified whether esophagitis present     POST-OP DIAGNOSIS:  See the impression below      PREPROCEDURE:  Informed consent was obtained for the procedure, including sedation  Risks of perforation, hemorrhage, adverse drug reaction and aspiration were discussed  The patient was placed in the left lateral decubitus position      Patient was explained about the risks and benefits of the procedure  Risks including but not limited to bleeding, infection, and perforation were explained in detail  Also explained about less than 100% sensitivity with the exam and other alternatives      DETAILS OF PROCEDURE:  Patient was taken to the procedure room where a time out was performed to confirm correct patient and correct procedure  The patient underwent monitored anesthesia care, which was administered by an anesthesia professional  The patient's blood pressure, heart rate, level of consciousness, respirations and oxygen were monitored throughout the procedure  The scope was advanced to the second part of the duodenum  Retroflexion was performed in the fundus  The patient experienced no blood loss  The procedure was not difficult   The patient tolerated the procedure well  There were no apparent complications       ANESTHESIA INFORMATION:  ASA: II  Anesthesia Type: IV Sedation with Anesthesia     MEDICATIONS:  No administrations occurring from 1100 to 1115 on 08/17/21         FINDINGS:  · Irregular Z-line 35 cm from the incisors; performed cold forceps biopsy  Two diminutive tongues of columnar type mucosa noted extending above squamocolumnar junction, biopsies were obtained  · Medium sliding hiatal hernia (type I hiatal hernia) without Edgard Basket lesions present - GE junction 35 cm from the incisors, diaphragmatic impression 39 cm from the incisors  · The stomach, duodenal bulb, 1st part of the duodenum and 2nd part of the duodenum appeared normal  Performed random biopsy  · Two polyps measuring smaller than 5 mm in the fundus of the stomach        SPECIMENS:  ID Type Source Tests Collected by Time Destination   1 : cold bx: duodenum  Tissue Small Bowel, NOS TISSUE EXAM Faustino Hairston MD 8/17/2021 11:11 AM     2 : cold bx: distal esophagus  Tissue Esophagus TISSUE EXAM Faustino Hairston MD 8/17/2021 11:12 AM           IMPRESSION:  1  Slightly irregular Z-line, biopsies obtained to assess for short-segment Quintanilla's  2  Hiatal hernia  3  Diminutive gastric polyps, likely fundic gland, biopsies of the larger polyps were previously obtained and were consistent with fundic gland      RECOMMENDATION:  Await pathology results  Can decrease PPI to once daily  Avoid NSAIDs  Follow anti-reflux measures  Will refer to bariatric surgery for repair of the hiatal hernia  Faustino Hairston MD    Final Diagnosis  A  Small Bowel, NOS, cold bx: duodenum biopsy:  -Benign small intestinal mucosa with retained villous architecture and no evidence of increased intraepithelial lymphocytes  -Mild non-specific chronic inflammation of lamina propria seen   -No evidence of dysplasia or malignancy    B  Esophagus, cold bx: distal esophagus biopsy:  -Gastroesophageal junction mucosa with cardiac type glands present in-between squamous mucosa (esophageal columnar  metaplasia)  -Mild chronic inflammation with reactive epithelial changes   -No evidence of Goblet cell metaplasia seen   -No evidence of glandular dysplasia or malignancy     --------------------------------------------------------------------    EGD reviewed and pathology negative for Quintanilla's  Patient is interested in surgical repair of hernia  Discussed benefit of weight loss to prevent reoccurrence with RNYGB vs fundoplication vs LINX  Patient's BMI may not qualify her for RNYGB  We need more information to determine best option  We will get Upper GI and esophageal manometry/24 pH study     We will determine surgical procedure after studies done and submit to insurance at that time  Follow up after studies           Kya Cruz PA-C  Bariatric Surgery  4/20/2022  2:33 PM

## 2022-05-02 ENCOUNTER — TELEPHONE (OUTPATIENT)
Dept: GASTROENTEROLOGY | Facility: HOSPITAL | Age: 62
End: 2022-05-02

## 2022-05-13 ENCOUNTER — HOSPITAL ENCOUNTER (OUTPATIENT)
Dept: RADIOLOGY | Facility: HOSPITAL | Age: 62
Discharge: HOME | End: 2022-05-13
Payer: COMMERCIAL

## 2022-05-13 DIAGNOSIS — E66.9 OBESITY, CLASS II, BMI 35-39.9: ICD-10-CM

## 2022-05-13 DIAGNOSIS — K44.9 HIATAL HERNIA: ICD-10-CM

## 2022-05-13 PROCEDURE — 74240 X-RAY XM UPR GI TRC 1CNTRST: CPT

## 2022-05-18 ENCOUNTER — HOSPITAL ENCOUNTER (OUTPATIENT)
Dept: GASTROENTEROLOGY | Facility: HOSPITAL | Age: 62
Discharge: HOME | End: 2022-05-18
Payer: COMMERCIAL

## 2022-05-18 VITALS
RESPIRATION RATE: 18 BRPM | SYSTOLIC BLOOD PRESSURE: 143 MMHG | OXYGEN SATURATION: 100 % | TEMPERATURE: 97.4 F | HEART RATE: 56 BPM | DIASTOLIC BLOOD PRESSURE: 82 MMHG

## 2022-05-18 DIAGNOSIS — K44.9 HIATAL HERNIA: ICD-10-CM

## 2022-05-18 DIAGNOSIS — E66.9 OBESITY, CLASS II, BMI 35-39.9: ICD-10-CM

## 2022-05-18 PROCEDURE — 91038 ESOPH IMPED FUNCT TEST > 1HR: CPT

## 2022-05-18 PROCEDURE — 91020 GASTRIC MOTILITY STUDIES: CPT

## 2022-05-18 NOTE — PERIOPERATIVE NURSING NOTE
Patient brought in the room and educated on procedure  Lidocaine 2% topical solution inserted via nostrils  Manometry catheter placed via right nostril and secured by [facility] RN  10 liquid swallows, 10 viscous swallow, 1 rapid swallow and 1 rapid drink challenge with 200 cc of water performed  Patient tolerated procedure and catheter removed intact  PH probe inserted via the right nostril and secured  Zephr recorder teach back performed and patient verbalized understanding  Patient instructed to return next day to have probe removed  Discharge instructions given and patient ambulated out of the room in stable condition

## 2022-05-20 PROCEDURE — 91038 ESOPH IMPED FUNCT TEST > 1HR: CPT | Performed by: INTERNAL MEDICINE

## 2022-05-20 PROCEDURE — 91010 ESOPHAGUS MOTILITY STUDY: CPT | Performed by: INTERNAL MEDICINE

## 2022-05-30 DIAGNOSIS — E78.2 MIXED HYPERLIPIDEMIA: ICD-10-CM

## 2022-06-04 RX ORDER — ATORVASTATIN CALCIUM 20 MG/1
TABLET, FILM COATED ORAL
Qty: 90 TABLET | Refills: 3 | Status: SHIPPED | OUTPATIENT
Start: 2022-06-04

## 2022-06-22 ENCOUNTER — TELEPHONE (OUTPATIENT)
Dept: OTHER | Facility: OTHER | Age: 62
End: 2022-06-22

## 2022-06-22 NOTE — TELEPHONE ENCOUNTER
Pt called, requesting a call back from office, at best convenience to schedule follow up appointment  Pt mentioned the tests that provided ordered are completed

## 2022-06-30 ENCOUNTER — OFFICE VISIT (OUTPATIENT)
Dept: BARIATRICS | Facility: CLINIC | Age: 62
End: 2022-06-30
Payer: COMMERCIAL

## 2022-06-30 VITALS
HEART RATE: 68 BPM | BODY MASS INDEX: 38.78 KG/M2 | TEMPERATURE: 97.5 F | HEIGHT: 71 IN | WEIGHT: 277 LBS | DIASTOLIC BLOOD PRESSURE: 80 MMHG | SYSTOLIC BLOOD PRESSURE: 122 MMHG

## 2022-06-30 DIAGNOSIS — K44.9 PARAESOPHAGEAL HIATAL HERNIA: ICD-10-CM

## 2022-06-30 DIAGNOSIS — K21.9 GASTROESOPHAGEAL REFLUX DISEASE, UNSPECIFIED WHETHER ESOPHAGITIS PRESENT: Primary | ICD-10-CM

## 2022-06-30 PROCEDURE — 1036F TOBACCO NON-USER: CPT | Performed by: SURGERY

## 2022-06-30 PROCEDURE — 3008F BODY MASS INDEX DOCD: CPT | Performed by: SURGERY

## 2022-06-30 PROCEDURE — 99213 OFFICE O/P EST LOW 20 MIN: CPT | Performed by: SURGERY

## 2022-06-30 NOTE — PROGRESS NOTES
Subjective:      64 y o  female presents for followPatient complaining of heartburn symptoms including reflux for the past 7 years  She is taking protonix 40mg BID with some relief  She denies nausea and vomiting  She has also taken nexium in the past without relief  Her symptoms wake her up at night and notes weird noise deep in chest at night  She notes low acid diet does not help much with symptoms  She does not eat after 5-6pm to help symptoms  Historical Information   Past Medical History:   Diagnosis Date    Back pain     Bradycardia     DDD (degenerative disc disease), lumbar     DJD (degenerative joint disease)     Female infertility     GERD (gastroesophageal reflux disease)     Hypercholesterolemia     Obesity     Uveitis     Vertigo      Past Surgical History:   Procedure Laterality Date    CATARACT EXTRACTION, BILATERAL      COLONOSCOPY      2021    DILATION AND CURETTAGE OF UTERUS      HYSTEROSCOPY  01/09/2012    MYOMECTOMY      TONSILLECTOMY AND ADENOIDECTOMY       Social History   Social History     Substance and Sexual Activity   Alcohol Use Yes    Comment: socially     Social History     Substance and Sexual Activity   Drug Use Never     Social History     Tobacco Use   Smoking Status Never Smoker   Smokeless Tobacco Never Used     Family History:   Family History   Problem Relation Age of Onset    Hypertension Mother     Heart murmur Father     Hyperlipidemia Family     Diabetes Sister     Breast cancer Paternal [de-identified]     Esophageal cancer Cousin        Meds/Allergies   all medications and allergies reviewed  Allergies   Allergen Reactions    Neomycin-Bacitracin Zn-Polymyx Rash       Objective   Review of Systems   Constitutional: Negative  HENT: Negative  Eyes: Negative  Respiratory: Negative  Cardiovascular: Negative  Gastrointestinal: Negative  Endocrine: Negative  Genitourinary: Negative  Musculoskeletal: Negative  Skin: Negative  Allergic/Immunologic: Negative  Neurological: Negative  Hematological: Negative  Psychiatric/Behavioral: Negative  All other systems reviewed and are negative  Objective:    /80   Pulse 68   Temp 97 5 °F (36 4 °C) (Tympanic)   Ht 5' 10 5" (1 791 m)   Wt 126 kg (277 lb)   BMI 39 18 kg/m²       Physical Exam  Vitals and nursing note reviewed  Constitutional:       Appearance: Normal appearance  HENT:      Head: Normocephalic and atraumatic  Nose: Nose normal       Mouth/Throat:      Mouth: Mucous membranes are moist       Pharynx: Oropharynx is clear  Eyes:      Extraocular Movements: Extraocular movements intact  Pupils: Pupils are equal, round, and reactive to light  Cardiovascular:      Rate and Rhythm: Normal rate and regular rhythm  Pulses: Normal pulses  Pulmonary:      Effort: Pulmonary effort is normal    Abdominal:      General: Abdomen is flat  Bowel sounds are normal       Palpations: Abdomen is soft  Musculoskeletal:         General: Normal range of motion  Cervical back: Normal range of motion and neck supple  Skin:     General: Skin is warm and dry  Neurological:      General: No focal deficit present  Mental Status: She is alert and oriented to person, place, and time  Mental status is at baseline  Psychiatric:         Mood and Affect: Mood normal          Behavior: Behavior normal          Thought Content: Thought content normal          Judgment: Judgment normal            Upper GI:   UPPER GI SERIES  DOUBLE CONTRAST     INDICATION:  History of hiatal hernia with symptomatic gastroesophageal reflux with heartburn     COMPARISON:  None     IMAGES:     FLUOROSCOPY TIME:   2 3 minutes     TECHNIQUE:  The patient was given effervescent granules and barium  by mouth and images of the esophagus, stomach, and small bowel were obtained       FINDINGS:     The esophagus is normal in caliber    There is intermittent disruption of the esophageal primary peristaltic stripping wave with otherwise normal motility and relatively prompt emptying of contrast from esophagus into the stomach  There is no mucosal   mass, ulceration or fold thickening identified      There is a small sliding-type hiatal hernia identified  There is moderate frequent gastroesophageal reflux witnessed without evidence for reflux esophagitis or stricture      The stomach is unremarkable in size  The gastric mucosa is normal   No penetrating ulcers or masses      Contrast empties promptly into the duodenum  The duodenum is normal in caliber  The ligament of Treitz/duodenojejunal junction lies in a normal position         IMPRESSION:        1  Mild esophageal dysmotility  2   Small hiatal hernia  3   Moderate frequent gastroesophageal reflux without evidence for reflux esophagitis or esophageal stricture  4   Otherwise unremarkable exam   EGD:     DATE OF SERVICE:  8/17/21     PHYSICIAN(S):  Boni Waggoner MD - Attending Physician        INDICATION:  Esophagitis, Gastroesophageal reflux disease, unspecified whether esophagitis present     POST-OP DIAGNOSIS:  See the impression below      PREPROCEDURE:  Informed consent was obtained for the procedure, including sedation  Risks of perforation, hemorrhage, adverse drug reaction and aspiration were discussed  The patient was placed in the left lateral decubitus position      Patient was explained about the risks and benefits of the procedure  Risks including but not limited to bleeding, infection, and perforation were explained in detail  Also explained about less than 100% sensitivity with the exam and other alternatives      DETAILS OF PROCEDURE:  Patient was taken to the procedure room where a time out was performed to confirm correct patient and correct procedure   The patient underwent monitored anesthesia care, which was administered by an anesthesia professional  The patient's blood pressure, heart rate, level of consciousness, respirations and oxygen were monitored throughout the procedure  The scope was advanced to the second part of the duodenum  Retroflexion was performed in the fundus  The patient experienced no blood loss  The procedure was not difficult  The patient tolerated the procedure well  There were no apparent complications       ANESTHESIA INFORMATION:  ASA: II  Anesthesia Type: IV Sedation with Anesthesia     MEDICATIONS:  No administrations occurring from 1100 to 1115 on 08/17/21         FINDINGS:  · Irregular Z-line 35 cm from the incisors; performed cold forceps biopsy  Two diminutive tongues of columnar type mucosa noted extending above squamocolumnar junction, biopsies were obtained  · Medium sliding hiatal hernia (type I hiatal hernia) without [place] Me[place] lesions present - GE junction 35 cm from the incisors, diaphragmatic impression 39 cm from the incisors  · The stomach, duodenal bulb, 1st part of the duodenum and 2nd part of the duodenum appeared normal  Performed random biopsy  · Two polyps measuring smaller than 5 mm in the fundus of the stomach        SPECIMENS:  ID Type Source Tests Collected by Time Destination   1 : cold bx: duodenum  Tissue Small Bowel, NOS TISSUE EXAM Savi Wade MD 8/17/2021 11:11 AM     2 : cold bx: distal esophagus  Tissue Esophagus TISSUE EXAM Savi Wade MD 8/17/2021 11:12 AM              IMPRESSION:  1  Slightly irregular Z-line, biopsies obtained to assess for short-segment Quintanilla's  2  Hiatal hernia  3  Diminutive gastric polyps, likely fundic gland, biopsies of the larger polyps were previously obtained and were consistent with fundic gland      RECOMMENDATION:  Await pathology results  Can decrease PPI to once daily  Avoid NSAIDs  Follow anti-reflux measures  Will refer to bariatric surgery for repair of the hiatal hernia  Savi Wade MD    Pathology:  Final Diagnosis   A   Small Bowel, NOS, cold bx: duodenum  biopsy:    -Benign small intestinal mucosa with retained villous architecture and no evidence of increased intraepithelial lymphocytes  -Mild non-specific chronic inflammation of lamina propria seen   -No evidence of dysplasia or malignancy       B  Esophagus, cold bx: distal esophagus biopsy:    -Gastroesophageal junction mucosa with cardiac type glands present in-between squamous mucosa (esophageal columnar metaplasia)  -Mild chronic inflammation with reactive epithelial changes   -No evidence of Goblet cell metaplasia seen   -No evidence of glandular dysplasia or malignancy  Manometry/ 24h pH study (5/18/22)  Indication- hiatal hernia     Esophageal manometry  Esophageal motility- 9/10 swallows demonstrate normal esophageal contractibility pattern with mean DCI of 1237 mmHg  s cm  1/10 swallows had failed esophageal contractibility pattern  LES- median IRP is within normal limits  Impedance- 100% complete clearance of liquid bolus swallows     Gastroesophageal junction relative LES measures 3 9 cm  Rapid swallow index is greater than 1     Sauquoit classification- esophageal motility with 3 9 cm hiatal hernia           24 hour pH study- off PPI therapy for 7 days     Acid exposure time is 9% overall  Acid exposure time in upright position is 3%  Acid exposure time in recumbent position is 19 5%  DeMeester score is 42     69 episodes of reflux  64 episodes in upright position- 40 episodes of acid reflux, 23 episodes of weakly acid reflux, 1 episode of weakly alkaline reflux  5 episodes in recumbent position- 4 episodes of acid reflux, 1 episode of weakly acid reflux           Symptom correlation was significant reported symptoms of belching and heartburn    Assessment/Plan:       Aleksandra Monet is a 64 y o  female  with BMI of 39 25 known medium sized hiatal hernia and 24hr pH studies confirming GERD   She does not wish to undergo a a gastric bypass, despite education regarding risk of recurrent hiatal hernia with BMI greater than 30  Discussed several options with the patient including PEH repair with fundoplication, PEH repair with sphincter augmentation with LINX, or PEH repair with C-TIF  There are no diagnoses linked to this encounter  · Continue PPI therapy  · Continue vitamins as directed, with routine blood work follow up  · Will plan for PEH repair with C-TIF versus PEH repair with LINX  Patient to make a decisison and contact office to speak with coordinator  · Follow up per bariatric protocol and with dieticians

## 2022-09-09 ENCOUNTER — OFFICE VISIT (OUTPATIENT)
Dept: FAMILY MEDICINE CLINIC | Facility: CLINIC | Age: 62
End: 2022-09-09
Payer: COMMERCIAL

## 2022-09-09 VITALS
HEART RATE: 62 BPM | DIASTOLIC BLOOD PRESSURE: 80 MMHG | HEIGHT: 71 IN | BODY MASS INDEX: 38.78 KG/M2 | SYSTOLIC BLOOD PRESSURE: 130 MMHG | TEMPERATURE: 97.6 F | WEIGHT: 277 LBS | OXYGEN SATURATION: 99 % | RESPIRATION RATE: 16 BRPM

## 2022-09-09 DIAGNOSIS — Z12.31 BREAST CANCER SCREENING BY MAMMOGRAM: ICD-10-CM

## 2022-09-09 DIAGNOSIS — M19.041 PRIMARY OSTEOARTHRITIS OF BOTH HANDS: ICD-10-CM

## 2022-09-09 DIAGNOSIS — R73.01 IFG (IMPAIRED FASTING GLUCOSE): ICD-10-CM

## 2022-09-09 DIAGNOSIS — Z00.00 PHYSICAL EXAM, ANNUAL: Primary | ICD-10-CM

## 2022-09-09 DIAGNOSIS — M19.042 PRIMARY OSTEOARTHRITIS OF BOTH HANDS: ICD-10-CM

## 2022-09-09 DIAGNOSIS — Z23 ENCOUNTER FOR IMMUNIZATION: ICD-10-CM

## 2022-09-09 DIAGNOSIS — E78.2 MIXED HYPERLIPIDEMIA: ICD-10-CM

## 2022-09-09 DIAGNOSIS — K20.90 ESOPHAGITIS: ICD-10-CM

## 2022-09-09 DIAGNOSIS — E66.9 OBESITY, CLASS II, BMI 35-39.9: ICD-10-CM

## 2022-09-09 PROCEDURE — 90471 IMMUNIZATION ADMIN: CPT

## 2022-09-09 PROCEDURE — 99396 PREV VISIT EST AGE 40-64: CPT | Performed by: NURSE PRACTITIONER

## 2022-09-09 PROCEDURE — 3725F SCREEN DEPRESSION PERFORMED: CPT | Performed by: NURSE PRACTITIONER

## 2022-09-09 PROCEDURE — 90715 TDAP VACCINE 7 YRS/> IM: CPT

## 2022-09-09 RX ORDER — PANTOPRAZOLE SODIUM 40 MG/1
40 TABLET, DELAYED RELEASE ORAL DAILY
Qty: 90 TABLET | Refills: 1 | Status: SHIPPED | OUTPATIENT
Start: 2022-09-09 | End: 2022-12-08

## 2022-09-09 NOTE — PROGRESS NOTES
FAMILY PRACTICE HEALTH MAINTENANCE OFFICE VISIT  [facility] - [facility]    NAME: Corinne Haque  AGE: 64 y o  SEX: female  : 1960     DATE: 9/10/2022    Assessment and Plan     1  Physical exam, annual    2  Esophagitis  Comments:  Continue pantoprazole  Continue gastroenterolgy and bariatircs follow up  Orders:  -     pantoprazole (PROTONIX) 40 mg tablet; Take 1 tablet (40 mg total) by mouth daily    3  IFG (impaired fasting glucose)  -     Hemoglobin A1C; Future    4  Mixed hyperlipidemia  Assessment & Plan:  Continue Atorvastatin  Repeat lipid panel  Orders:  -     Lipid panel; Future    5  Obesity, Class II, BMI 35-39 9  Assessment & Plan:  Check blood work as noted  Increase activity level  Orders:  -     CBC; Future  -     Comprehensive metabolic panel; Future  -     TSH, 3rd generation; Future    6  Breast cancer screening by mammogram  -     Mammo screening bilateral w 3d & cad; Future; Expected date: 2022    7  Encounter for immunization  -     TDAP VACCINE GREATER THAN OR EQUAL TO 6YO IM    8  Primary osteoarthritis of both hands  Comments:  Trial of voltaren gel as needed  Patient Counseling:   Nutrition: Stressed importance of a well balanced diet, moderation of sodium/saturated fat, caloric balance and sufficient intake of fiber  Exercise: Stressed the importance of regular exercise with a goal of 150 minutes per week  Dental Health: Discussed daily flossing and brushing and regular dental visits     Immunizations reviewed: she had some vaccines at [facility], will check on which ones  Possibly Shigrix and a pneumococcal vaccine  Tdap updated today  Discussed benefits of:  Colon Cancer Screening, Mammogram , Cervical Cancer screening and Screening labs  BMI Counseling: Body mass index is 39 18 kg/m²  Discussed with patient's BMI with her  The BMI is above normal  Nutrition recommendations include reducing portion sizes   Increasing activity level  Complete blood work at this time  Follow up in one year for annual physical exam or sooner if needed  Chief Complaint     Chief Complaint   Patient presents with    Well Check       History of Present Illness     Tom Cardenas is a 64year old female presenting today for annual exam      GERD: following with gastroenterology and recently bariatric surgery to discuss hiatal hernia repair  She does not desire surgical repair, but is deciding between some other procedures that were offered to her as options that are non-surgical      Was taking pantoprazole 40 mg daily, was working well, prescription ran out  Recently started using OTC Nexium  [place] her food triggers and made lifestyle changes that also help with GERD  Colonoscopy to be scheduled  Had one pneumococcal vaccine at [facility], not sure which vaccine  Not sure about shingrix, may have had at [facility]    She will check on this  Follows with Dr Óscar Flores for gyn care  Starting to get arthritis in hands  Well Adult Physical   Patient here for a comprehensive physical exam       Diet and Physical Activity  Diet: well balanced diet, Eating good foods, needs better portion control  Exercise: infrequently Just got an apple watch, which she notes confirms she is not very active, trying to work on this  Depression Screen  PHQ-2/9 Depression Screening    Little interest or pleasure in doing things: 0 - not at all  Feeling down, depressed, or hopeless: 0 - not at all  PHQ-2 Score: 0  PHQ-2 Interpretation: Negative depression screen          General Health  Hearing: Normal:  bilateral  Vision: no vision problems and most recent eye exam >1 year, cataracts due to uveitis, had cataract surgery    Dental: regular dental visits    Reproductive Health  No issues       The following portions of the patient's history were reviewed and updated as appropriate: allergies, current medications, past family history, past medical history, past social history, past surgical history and problem list     Review of Systems     Review of Systems   Constitutional: Negative  HENT: Negative  Eyes: Negative for visual disturbance  Respiratory: Negative  Cardiovascular: Negative  Gastrointestinal: Negative  Genitourinary: Negative  Musculoskeletal: Positive for arthralgias (hands)  Skin: Negative  Neurological: Negative  Hematological: Negative  Psychiatric/Behavioral: Negative          Past Medical History     Past Medical History:   Diagnosis Date    Back pain     Bradycardia     DDD (degenerative disc disease), lumbar     DJD (degenerative joint disease)     Female infertility     GERD (gastroesophageal reflux disease)     Hypercholesterolemia     Obesity     Uveitis     Vertigo        Past Surgical History     Past Surgical History:   Procedure Laterality Date    CATARACT EXTRACTION, BILATERAL      COLONOSCOPY      2021    DILATION AND CURETTAGE OF UTERUS      HYSTEROSCOPY  01/09/2012    MYOMECTOMY      TONSILLECTOMY AND ADENOIDECTOMY         Social History     Social History     Socioeconomic History    Marital status: /Civil Union     Spouse name: None    Number of children: None    Years of education: None    Highest education level: None   Occupational History    None   Tobacco Use    Smoking status: Never Smoker    Smokeless tobacco: Never Used   Vaping Use    Vaping Use: Never used   Substance and Sexual Activity    Alcohol use: Yes     Comment: socially    Drug use: Never    Sexual activity: None   Other Topics Concern    None   Social History Narrative    None     Social Determinants of Health     Financial Resource Strain: Not on file   Food Insecurity: Not on file   Transportation Needs: Not on file   Physical Activity: Not on file   Stress: Not on file   Social Connections: Not on file   Intimate Partner Violence: Not on file   Housing Stability: Not on file Family History     Family History   Problem Relation Age of Onset    Hypertension Mother     Heart murmur Father     Hyperlipidemia Family     Diabetes Sister     Breast cancer Paternal Aunt     Esophageal cancer Cousin        Current Medications       Current Outpatient Medications:     ammonium lactate (LAC-HYDRIN) 12 % cream, APPLY DAILY AFTER THE SHOWER, Disp: , Rfl:     atorvastatin (LIPITOR) 20 mg tablet, TAKE 1 TABLET BY MOUTH EVERY DAY, Disp: 90 tablet, Rfl: 3    glucosamine-chondroitin 500-400 MG tablet, Take 1 tablet by mouth 3 (three) times a day, Disp: , Rfl:     Multiple Vitamins-Minerals (MULTIVITAMIN WITH MINERALS) tablet, Take 1 tablet by mouth daily, Disp: , Rfl:     pantoprazole (PROTONIX) 40 mg tablet, Take 1 tablet (40 mg total) by mouth daily, Disp: 90 tablet, Rfl: 1     Allergies     Allergies   Allergen Reactions    Neomycin-Bacitracin Zn-Polymyx Rash       Objective     /80   Pulse 62   Temp 97 6 °F (36 4 °C) (Temporal)   Resp 16   Ht 5' 10 5" (1 791 m)   Wt 126 kg (277 lb)   SpO2 99%   BMI 39 18 kg/m²      Physical Exam  Vitals and nursing note reviewed  Constitutional:       General: She is not in acute distress  Appearance: Normal appearance  She is not ill-appearing  HENT:      Head: Atraumatic  Right Ear: There is impacted cerumen  Left Ear: Tympanic membrane normal       Mouth/Throat:      Mouth: Mucous membranes are moist       Pharynx: Oropharynx is clear  Eyes:      Conjunctiva/sclera: Conjunctivae normal       Pupils: Pupils are equal, round, and reactive to light  Neck:      Thyroid: No thyromegaly  Cardiovascular:      Rate and Rhythm: Normal rate and regular rhythm  Heart sounds: No murmur heard  Pulmonary:      Effort: Pulmonary effort is normal  No respiratory distress  Breath sounds: Normal breath sounds  Musculoskeletal:      Cervical back: Normal range of motion and neck supple        Right lower leg: No edema  Left lower leg: No edema  Comments: Evidence of Heberden's nodes starting on DIP joints  Lymphadenopathy:      Cervical: No cervical adenopathy  Skin:     General: Skin is warm and dry  Findings: No rash  Neurological:      Mental Status: She is alert and oriented to person, place, and time     Psychiatric:         Mood and Affect: Mood normal            HELEN Betancourt  [address] [facility]

## 2022-09-10 PROBLEM — Z12.11 COLON CANCER SCREENING: Status: RESOLVED | Noted: 2021-01-05 | Resolved: 2022-09-10

## 2022-09-10 PROBLEM — N64.4 BREAST PAIN: Status: RESOLVED | Noted: 2017-02-01 | Resolved: 2022-09-10

## 2022-09-10 PROBLEM — R92.8 ABNORMAL FINDING ON MAMMOGRAPHY: Status: RESOLVED | Noted: 2017-02-01 | Resolved: 2022-09-10

## 2022-09-11 LAB
ALBUMIN SERPL-MCNC: 4.3 G/DL (ref 3.6–5.1)
ALBUMIN/GLOB SERPL: 1.9 (CALC) (ref 1–2.5)
ALP SERPL-CCNC: 103 U/L (ref 37–153)
ALT SERPL-CCNC: 12 U/L (ref 6–29)
AST SERPL-CCNC: 13 U/L (ref 10–35)
BILIRUB SERPL-MCNC: 0.6 MG/DL (ref 0.2–1.2)
BUN SERPL-MCNC: 18 MG/DL (ref 7–25)
BUN/CREAT SERPL: ABNORMAL (CALC) (ref 6–22)
CALCIUM SERPL-MCNC: 9.9 MG/DL (ref 8.6–10.4)
CHLORIDE SERPL-SCNC: 107 MMOL/L (ref 98–110)
CHOLEST SERPL-MCNC: 148 MG/DL
CHOLEST/HDLC SERPL: 3.1 (CALC)
CO2 SERPL-SCNC: 31 MMOL/L (ref 20–32)
CREAT SERPL-MCNC: 0.91 MG/DL (ref 0.5–1.05)
ERYTHROCYTE [DISTWIDTH] IN BLOOD BY AUTOMATED COUNT: 13.5 % (ref 11–15)
GFR/BSA.PRED SERPLBLD CYS-BASED-ARV: 72 ML/MIN/1.73M2
GLOBULIN SER CALC-MCNC: 2.3 G/DL (CALC) (ref 1.9–3.7)
GLUCOSE SERPL-MCNC: 101 MG/DL (ref 65–99)
HBA1C MFR BLD: 5.4 % OF TOTAL HGB
HCT VFR BLD AUTO: 40.4 % (ref 35–45)
HDLC SERPL-MCNC: 48 MG/DL
HGB BLD-MCNC: 13.3 G/DL (ref 11.7–15.5)
LDLC SERPL CALC-MCNC: 85 MG/DL (CALC)
MCH RBC QN AUTO: 31.3 PG (ref 27–33)
MCHC RBC AUTO-ENTMCNC: 32.9 G/DL (ref 32–36)
MCV RBC AUTO: 95.1 FL (ref 80–100)
NONHDLC SERPL-MCNC: 100 MG/DL (CALC)
PLATELET # BLD AUTO: 176 THOUSAND/UL (ref 140–400)
PMV BLD REES-ECKER: 11.9 FL (ref 7.5–12.5)
POTASSIUM SERPL-SCNC: 4.9 MMOL/L (ref 3.5–5.3)
PROT SERPL-MCNC: 6.6 G/DL (ref 6.1–8.1)
RBC # BLD AUTO: 4.25 MILLION/UL (ref 3.8–5.1)
SODIUM SERPL-SCNC: 144 MMOL/L (ref 135–146)
TRIGL SERPL-MCNC: 61 MG/DL
TSH SERPL-ACNC: 3.39 MIU/L (ref 0.4–4.5)
WBC # BLD AUTO: 5 THOUSAND/UL (ref 3.8–10.8)

## 2022-09-12 ENCOUNTER — TELEPHONE (OUTPATIENT)
Dept: FAMILY MEDICINE CLINIC | Facility: CLINIC | Age: 62
End: 2022-09-12

## 2022-09-12 NOTE — TELEPHONE ENCOUNTER
----- Message from [facility] sent at 9/11/2022  8:17 PM EDT -----  All blood work is good

## 2022-10-06 ENCOUNTER — TELEPHONE (OUTPATIENT)
Dept: GASTROENTEROLOGY | Facility: AMBULARY SURGERY CENTER | Age: 62
End: 2022-10-06

## 2022-10-07 ENCOUNTER — TELEPHONE (OUTPATIENT)
Dept: GASTROENTEROLOGY | Facility: CLINIC | Age: 62
End: 2022-10-07

## 2022-10-07 NOTE — TELEPHONE ENCOUNTER
VM from Ge Tanner @ [facility] to coordinate TIF between Dr Irvin Ewing and Dr Alanis Martines  TC to Ge Tanner to discuss  Pt wants to wait to schedule until January  Dr Irvin Ewing performs procedures at [facility] on 3rd Friday of each month  This writer will communicate with GI scheduling to determine Dr Tiffany Yoder availability on January 20, 2023

## 2022-10-10 DIAGNOSIS — Z12.11 COLON CANCER SCREENING: Primary | ICD-10-CM

## 2022-10-10 RX ORDER — POLYETHYLENE GLYCOL-3350 AND ELECTROLYTES 236; 6.74; 5.86; 2.97; 22.74 G/274.31G; G/274.31G; G/274.31G; G/274.31G; G/274.31G
4 POWDER, FOR SOLUTION ORAL ONCE
Qty: 4000 ML | Refills: 0 | Status: SHIPPED | OUTPATIENT
Start: 2022-10-10 | End: 2022-10-10

## 2022-10-10 RX ORDER — SODIUM PICOSULFATE, MAGNESIUM OXIDE, AND ANHYDROUS CITRIC ACID 10; 3.5; 12 MG/160ML; G/160ML; G/160ML
1 LIQUID ORAL ONCE
Qty: 320 ML | Refills: 0 | Status: SHIPPED | OUTPATIENT
Start: 2022-10-10 | End: 2022-10-10

## 2022-10-10 NOTE — TELEPHONE ENCOUNTER
Per GI scheduling, Dr Shea Neves is on vacation on January 20, 2023  Will communicate with [facility] re: additional scheduling options

## 2022-10-10 NOTE — TELEPHONE ENCOUNTER
Patient is scheduled for colonoscopy on January 10 , 2023 at [facility]  with Heber Fish MD  Patient is aware of pre-procedure prep of Clenpiq and they will be called the day prior between 2 and 6 pm for time to report for procedure  Pre-procedure prep has been given to the patient  via E-mail on October 10, 2022 
Pt would like to schedule recall colon with Dr Terrell Murdock, last done  3-2021
[id number]/[facility]

## 2022-10-13 DIAGNOSIS — K21.9 GASTROESOPHAGEAL REFLUX DISEASE, UNSPECIFIED WHETHER ESOPHAGITIS PRESENT: Primary | ICD-10-CM

## 2022-10-13 NOTE — TELEPHONE ENCOUNTER
TC from Helena Ernst @ [facility] to coordinate Dr Candance Clamp schedule with Dr Bart Branham to complete joint procedure involving TIF at [facility]   Discussed 2/7 or 2/13  Teams message to Tiffanie Cano @ [facility] states Dr Candance Clamp schedule not yet finalized for February  Teams message to Helena Ernst - advised this writer will postpone this request and will follow up on it again in about a month  Helena Ernst in agreement - states she spoke with patient who indicated 2/7/22 is preferable

## 2022-11-21 ENCOUNTER — TELEPHONE (OUTPATIENT)
Dept: GASTROENTEROLOGY | Facility: CLINIC | Age: 62
End: 2022-11-21

## 2022-11-21 NOTE — TELEPHONE ENCOUNTER
Patients GI provider:  Dr Oksana Miles    Number to return call: [phone]    Reason for call: Pt called and asked if we can  send genetic prep for clenpiq which is call peg electrolyte solution if possible please review and reach out to patient thank you     Scheduled procedure/appointment date if applicable: procedure 75/35/[id number]

## 2022-11-22 DIAGNOSIS — Z12.11 COLON CANCER SCREENING: ICD-10-CM

## 2022-11-22 RX ORDER — POLYETHYLENE GLYCOL-3350 AND ELECTROLYTES 236; 6.74; 5.86; 2.97; 22.74 G/274.31G; G/274.31G; G/274.31G; G/274.31G; G/274.31G
4 POWDER, FOR SOLUTION ORAL ONCE
Qty: 4000 ML | Refills: 0 | Status: SHIPPED | OUTPATIENT
Start: 2022-11-22 | End: 2023-01-10 | Stop reason: ALTCHOICE

## 2022-11-22 NOTE — TELEPHONE ENCOUNTER
PEG electrolyte solution is not the generic of clenpiq  There is no generic of clenpiq is this is a brand name bowel prep  Clenpiq is not covered under patient's insurance  It appears that Isaak Carter was sent back in October  I will reorder this which is the PEG electrolyte solution

## 2022-12-28 ENCOUNTER — HOSPITAL ENCOUNTER (OUTPATIENT)
Dept: RADIOLOGY | Age: 62
Discharge: HOME | End: 2022-12-28

## 2022-12-28 VITALS — HEIGHT: 71 IN | BODY MASS INDEX: 38.78 KG/M2 | WEIGHT: 277 LBS

## 2022-12-28 DIAGNOSIS — Z12.31 BREAST CANCER SCREENING BY MAMMOGRAM: ICD-10-CM

## 2023-01-10 ENCOUNTER — ANESTHESIA (OUTPATIENT)
Dept: GASTROENTEROLOGY | Facility: AMBULARY SURGERY CENTER | Age: 63
End: 2023-01-10

## 2023-01-10 ENCOUNTER — ANESTHESIA EVENT (OUTPATIENT)
Dept: ANESTHESIOLOGY | Facility: HOSPITAL | Age: 63
End: 2023-01-10

## 2023-01-10 ENCOUNTER — ANESTHESIA (OUTPATIENT)
Dept: ANESTHESIOLOGY | Facility: HOSPITAL | Age: 63
End: 2023-01-10

## 2023-01-10 ENCOUNTER — ANESTHESIA EVENT (OUTPATIENT)
Dept: GASTROENTEROLOGY | Facility: AMBULARY SURGERY CENTER | Age: 63
End: 2023-01-10

## 2023-01-10 ENCOUNTER — HOSPITAL ENCOUNTER (OUTPATIENT)
Dept: GASTROENTEROLOGY | Facility: AMBULARY SURGERY CENTER | Age: 63
Setting detail: OUTPATIENT SURGERY
Discharge: HOME | End: 2023-01-10
Attending: INTERNAL MEDICINE | Admitting: INTERNAL MEDICINE

## 2023-01-10 VITALS
OXYGEN SATURATION: 98 % | BODY MASS INDEX: 39.51 KG/M2 | RESPIRATION RATE: 16 BRPM | WEIGHT: 276 LBS | HEIGHT: 70 IN | SYSTOLIC BLOOD PRESSURE: 138 MMHG | HEART RATE: 81 BPM | DIASTOLIC BLOOD PRESSURE: 76 MMHG | TEMPERATURE: 97 F

## 2023-01-10 DIAGNOSIS — Z86.010 HISTORY OF COLON POLYPS: ICD-10-CM

## 2023-01-10 RX ORDER — LIDOCAINE HYDROCHLORIDE 10 MG/ML
INJECTION, SOLUTION EPIDURAL; INFILTRATION; INTRACAUDAL; PERINEURAL AS NEEDED
Status: DISCONTINUED | OUTPATIENT
Start: 2023-01-10 | End: 2023-01-10

## 2023-01-10 RX ORDER — SODIUM CHLORIDE, SODIUM LACTATE, POTASSIUM CHLORIDE, CALCIUM CHLORIDE 600; 310; 30; 20 MG/100ML; MG/100ML; MG/100ML; MG/100ML
INJECTION, SOLUTION INTRAVENOUS CONTINUOUS PRN
Status: DISCONTINUED | OUTPATIENT
Start: 2023-01-10 | End: 2023-01-10

## 2023-01-10 RX ORDER — GLYCOPYRROLATE 0.2 MG/ML
INJECTION INTRAMUSCULAR; INTRAVENOUS AS NEEDED
Status: DISCONTINUED | OUTPATIENT
Start: 2023-01-10 | End: 2023-01-10

## 2023-01-10 RX ORDER — PROPOFOL 10 MG/ML
INJECTION, EMULSION INTRAVENOUS AS NEEDED
Status: DISCONTINUED | OUTPATIENT
Start: 2023-01-10 | End: 2023-01-10

## 2023-01-10 RX ADMIN — PROPOFOL 50 MG: 10 INJECTION, EMULSION INTRAVENOUS at 14:06

## 2023-01-10 RX ADMIN — PROPOFOL 50 MG: 10 INJECTION, EMULSION INTRAVENOUS at 14:30

## 2023-01-10 RX ADMIN — PROPOFOL 100 MG: 10 INJECTION, EMULSION INTRAVENOUS at 14:03

## 2023-01-10 RX ADMIN — GLYCOPYRROLATE 0.2 MG: 0.2 INJECTION, SOLUTION INTRAMUSCULAR; INTRAVENOUS at 14:03

## 2023-01-10 RX ADMIN — SODIUM CHLORIDE, SODIUM LACTATE, POTASSIUM CHLORIDE, AND CALCIUM CHLORIDE: .6; .31; .03; .02 INJECTION, SOLUTION INTRAVENOUS at 12:56

## 2023-01-10 RX ADMIN — PROPOFOL 50 MG: 10 INJECTION, EMULSION INTRAVENOUS at 14:26

## 2023-01-10 RX ADMIN — PROPOFOL 50 MG: 10 INJECTION, EMULSION INTRAVENOUS at 14:15

## 2023-01-10 RX ADMIN — PROPOFOL 50 MG: 10 INJECTION, EMULSION INTRAVENOUS at 14:22

## 2023-01-10 RX ADMIN — Medication 40 MG: at 14:22

## 2023-01-10 RX ADMIN — LIDOCAINE HYDROCHLORIDE 50 MG: 10 INJECTION, SOLUTION EPIDURAL; INFILTRATION; INTRACAUDAL; PERINEURAL at 14:03

## 2023-01-10 RX ADMIN — PROPOFOL 50 MG: 10 INJECTION, EMULSION INTRAVENOUS at 14:19

## 2023-01-10 RX ADMIN — PROPOFOL 50 MG: 10 INJECTION, EMULSION INTRAVENOUS at 14:28

## 2023-01-10 RX ADMIN — PROPOFOL 50 MG: 10 INJECTION, EMULSION INTRAVENOUS at 14:24

## 2023-01-10 RX ADMIN — PROPOFOL 50 MG: 10 INJECTION, EMULSION INTRAVENOUS at 14:12

## 2023-01-10 RX ADMIN — PROPOFOL 50 MG: 10 INJECTION, EMULSION INTRAVENOUS at 14:17

## 2023-01-10 RX ADMIN — PROPOFOL 50 MG: 10 INJECTION, EMULSION INTRAVENOUS at 14:08

## 2023-01-10 NOTE — ANESTHESIA PREPROCEDURE EVALUATION
Procedure:  COLONOSCOPY    Relevant Problems   CARDIO   (+) Mixed hyperlipidemia      GI/HEPATIC   (+) Esophageal reflux      Morbid obesity BMI 40  +dry cough x 2 weeks, (-) covid test, denies fever    Physical Exam    Airway    Mallampati score: II  TM Distance: >3 FB  Neck ROM: full     Dental       Cardiovascular      Pulmonary      Other Findings        Anesthesia Plan  ASA Score- 3     Anesthesia Type- IV sedation with anesthesia with ASA Monitors  Additional Monitors:   Airway Plan:           Plan Factors-Exercise tolerance (METS): >4 METS  Chart reviewed  Patient summary reviewed  Patient is not a current smoker  Induction- intravenous  Postoperative Plan-     Informed Consent- Anesthetic plan and risks discussed with patient  I personally reviewed this patient with the CRNA  Discussed and agreed on the Anesthesia Plan with the CRNA  Jon Allen

## 2023-01-10 NOTE — ANESTHESIA POSTPROCEDURE EVALUATION
Post-Op Assessment Note    CV Status:  Stable  Pain Score: 0    Pain management: adequate     Mental Status:  Alert and awake   Hydration Status:  Euvolemic   PONV Controlled:  Controlled   Airway Patency:  Patent      Post Op Vitals Reviewed: Yes      Staff: CRNA         No notable events documented      BP (!) 174/109 (01/10/23 1433)    Temp      Pulse 102 (01/10/23 1433)   Resp 15 (01/10/23 1433)    SpO2 100 % (01/10/23 1433)

## 2023-01-10 NOTE — H&P
History and Physical -  [facility]  Sofía Montgomery 58 y o  female MRN: [id number]    HPI: Sofía Montgomery is a 58y o  year old female who presents for evaluation of colon polyps, had piecemeal resection for large polyp last year        Review of Systems    Historical Information   Past Medical History:   Diagnosis Date   • Back pain    • Bradycardia    • Colon polyp    • DDD (degenerative disc disease), lumbar    • DJD (degenerative joint disease)    • Female infertility    • GERD (gastroesophageal reflux disease)    • Hypercholesterolemia    • Obesity    • Uveitis    • Vertigo      Past Surgical History:   Procedure Laterality Date   • CATARACT EXTRACTION, BILATERAL     • COLONOSCOPY      2021   • DILATION AND CURETTAGE OF UTERUS     • HYSTEROSCOPY  01/09/2012   • MYOMECTOMY     • TONSILLECTOMY AND ADENOIDECTOMY       Social History   Social History     Substance and Sexual Activity   Alcohol Use Yes    Comment: socially     Social History     Substance and Sexual Activity   Drug Use Never     Social History     Tobacco Use   Smoking Status Never   Smokeless Tobacco Never     Family History   Problem Relation Age of Onset   • Hypertension Mother    • Heart murmur Father    • Diabetes Sister    • No Known Problems Sister    • No Known Problems Maternal Grandmother    • No Known Problems Maternal Grandfather    • No Known Problems Paternal Grandmother    • No Known Problems Paternal Grandfather    • No Known Problems Maternal Aunt    • No Known Problems Maternal Aunt    • Breast cancer Paternal Aunt 46   • Esophageal cancer Cousin 61   • Hyperlipidemia Family        Meds/Allergies     (Not in a hospital admission)      Allergies   Allergen Reactions   • Neomycin-Bacitracin Zn-Polymyx Rash       Objective     /88   Pulse 81   Temp 98 1 °F (36 7 °C) (Temporal)   Resp 17   Ht 5' 10" (1 778 m)   Wt 125 kg (276 lb)   SpO2 96%   BMI 39 60 kg/m²       PHYSICAL EXAM    Gen: NAD  CV: RRR  CHEST: Clear  ABD: soft, NT/ND  EXT: no edema  Neuro: AAO      ASSESSMENT/PLAN:  This is a 58y o  year old female here for evaluation of colon polyps  PLAN:   Procedure: Colonoscopy

## 2023-01-11 ENCOUNTER — OFFICE VISIT (OUTPATIENT)
Dept: FAMILY MEDICINE CLINIC | Facility: CLINIC | Age: 63
End: 2023-01-11

## 2023-01-11 VITALS
DIASTOLIC BLOOD PRESSURE: 90 MMHG | WEIGHT: 276 LBS | RESPIRATION RATE: 18 BRPM | BODY MASS INDEX: 39.51 KG/M2 | TEMPERATURE: 97.8 F | HEIGHT: 70 IN | SYSTOLIC BLOOD PRESSURE: 120 MMHG | HEART RATE: 85 BPM | OXYGEN SATURATION: 93 %

## 2023-01-11 DIAGNOSIS — J40 BRONCHITIS: Primary | ICD-10-CM

## 2023-01-11 RX ORDER — PREDNISONE 10 MG/1
TABLET ORAL
Qty: 20 TABLET | Refills: 0 | Status: SHIPPED | OUTPATIENT
Start: 2023-01-11

## 2023-01-11 RX ORDER — ALBUTEROL SULFATE 90 UG/1
2 AEROSOL, METERED RESPIRATORY (INHALATION) EVERY 6 HOURS PRN
Qty: 18 G | Refills: 0 | Status: SHIPPED | OUTPATIENT
Start: 2023-01-11

## 2023-01-11 RX ORDER — AZITHROMYCIN 250 MG/1
TABLET, FILM COATED ORAL
Qty: 6 TABLET | Refills: 0 | Status: SHIPPED | OUTPATIENT
Start: 2023-01-11 | End: 2023-01-16

## 2023-01-11 NOTE — PROGRESS NOTES
FAMILY PRACTICE OFFICE VISIT       NAME: Estela Rand  AGE: 58 y o  SEX: female       : 1960        MRN: [id number]    Assessment and Plan   1  Bronchitis  -     azithromycin (ZITHROMAX) 250 mg tablet; Take 2 tablets on day 1 and then take 1 tablet on days 2-5   -     predniSONE 10 mg tablet; Take 4 tablets for 2 days, 3 tablets for 2 days, 2 tablets for 2 days, 1 tablet for 2 days  -     albuterol (ProAir HFA) 90 mcg/act inhaler; Inhale 2 puffs every 6 (six) hours as needed for wheezing     Call if symptoms are not improving by early next week  Chief Complaint     Chief Complaint   Patient presents with   • Cold Like Symptoms     Cough, congestion, head persistent since December  Worse 1 + wk       History of Present Illness     Estela Rand is a 58year old female presenting today for URI symptoms  December was on a cruise and started with URI symptoms  Negative COVID-19 test    Was getting better, but had not completely resolved  1 week ago, symptoms came back with vengeance  Headaches,congestion, cough  No fevers or body aches  Wheezing and chest tightness  Constant drip  Colonoscopy yesterday  Review of Systems   Review of Systems   Constitutional: Negative  HENT: Positive for congestion  Negative for ear pain, sinus pressure, sinus pain and sore throat  Respiratory: Positive for cough, chest tightness and wheezing  Negative for shortness of breath  Neurological: Positive for headaches  I have reviewed the patient's medical history in detail; there are no changes to the history as noted in the electronic medical record      Objective     Vitals:    23 1050   BP: 120/90   Pulse: 85   Resp: 18   Temp: 97 8 °F (36 6 °C)   TempSrc: Temporal   SpO2: 93%   Weight: 125 kg (276 lb)   Height: 5' 10" (1 778 m)     Wt Readings from Last 3 Encounters:   23 125 kg (276 lb)   01/10/23 125 kg (276 lb)   22 126 kg (277 lb)     Physical Exam  Vitals and nursing note reviewed  Constitutional:       Appearance: Normal appearance  HENT:      Head: Atraumatic  Right Ear: Tympanic membrane normal       Left Ear: Tympanic membrane normal       Nose: Nose normal       Mouth/Throat:      Mouth: Mucous membranes are moist       Pharynx: Oropharynx is clear  Cardiovascular:      Rate and Rhythm: Normal rate and regular rhythm  Heart sounds: No murmur heard  Pulmonary:      Effort: Pulmonary effort is normal       Breath sounds: Wheezing (wheezes end of inspriation  Mosit harsh cough) present  Musculoskeletal:      Cervical back: Normal range of motion and neck supple  Lymphadenopathy:      Cervical: No cervical adenopathy  Neurological:      Mental Status: She is alert  Psychiatric:         Mood and Affect: Mood normal             ALLERGIES:  Allergies   Allergen Reactions   • Neomycin-Bacitracin Zn-Polymyx Rash       Current Medications     Current Outpatient Medications   Medication Sig Dispense Refill   • albuterol (ProAir HFA) 90 mcg/act inhaler Inhale 2 puffs every 6 (six) hours as needed for wheezing 18 g 0   • ammonium lactate (LAC-HYDRIN) 12 % cream APPLY DAILY AFTER THE SHOWER     • atorvastatin (LIPITOR) 20 mg tablet TAKE 1 TABLET BY MOUTH EVERY DAY 90 tablet 3   • azithromycin (ZITHROMAX) 250 mg tablet Take 2 tablets on day 1 and then take 1 tablet on days 2-5  6 tablet 0   • glucosamine-chondroitin 500-400 MG tablet Take 1 tablet by mouth 3 (three) times a day     • Multiple Vitamins-Minerals (MULTIVITAMIN WITH MINERALS) tablet Take 1 tablet by mouth daily     • predniSONE 10 mg tablet Take 4 tablets for 2 days, 3 tablets for 2 days, 2 tablets for 2 days, 1 tablet for 2 days  20 tablet 0   • pantoprazole (PROTONIX) 40 mg tablet Take 1 tablet (40 mg total) by mouth daily 90 tablet 1     No current facility-administered medications for this visit           Health Maintenance     Health Maintenance   Topic Date Due   • Hepatitis C Screening  Never done   • HIV Screening  Never done   • COVID-19 Vaccine (4 - Booster for Pfizer series) 02/15/2022   • Depression Screening  09/09/2023   • Annual Physical  09/09/2023   • BMI: Followup Plan  09/10/2023   • BMI: Adult  01/11/2024   • Breast Cancer Screening: Mammogram  12/28/2024   • Cervical Cancer Screening  12/15/2025   • Colorectal Cancer Screening  01/09/2026   • DTaP,Tdap,and Td Vaccines (3 - Td or Tdap) 09/09/2032   • Influenza Vaccine  Completed   • Pneumococcal Vaccine: Pediatrics (0 to 5 Years) and At-Risk Patients (6 to 59 Years)  Aged Out   • HIB Vaccine  Aged Out   • IPV Vaccine  Aged Out   • Hepatitis A Vaccine  Aged Out   • Meningococcal ACWY Vaccine  Aged Out   • HPV Vaccine  Aged [employer] History   Administered Date(s) Administered   • COVID-19 PFIZER VACCINE 0 3 ML IM 04/01/2021, 04/22/2021, 12/21/2021   • INFLUENZA 11/15/2014, 10/13/2017, 10/09/2018, 10/05/2022   • Influenza, injectable, quadrivalent, preservative free 0 5 mL 11/03/2020   • Tdap 04/19/2011, 09/09/2022       HELEN Mar

## 2023-01-24 ENCOUNTER — OFFICE VISIT (OUTPATIENT)
Dept: CARDIOLOGY CLINIC | Facility: CLINIC | Age: 63
End: 2023-01-24

## 2023-01-24 VITALS
WEIGHT: 275.6 LBS | BODY MASS INDEX: 38.58 KG/M2 | SYSTOLIC BLOOD PRESSURE: 126 MMHG | HEIGHT: 71 IN | DIASTOLIC BLOOD PRESSURE: 78 MMHG | HEART RATE: 81 BPM | OXYGEN SATURATION: 96 %

## 2023-01-24 DIAGNOSIS — E78.2 MIXED HYPERLIPIDEMIA: ICD-10-CM

## 2023-01-24 DIAGNOSIS — R00.1 BRADYCARDIA: ICD-10-CM

## 2023-01-24 DIAGNOSIS — Z01.810 PREOP CARDIOVASCULAR EXAM: Primary | ICD-10-CM

## 2023-01-24 NOTE — PATIENT INSTRUCTIONS
The patient is advised that she is stable from the cardiac standpoint to proceed with surgery  She can continue her present medications

## 2023-01-24 NOTE — ASSESSMENT & PLAN NOTE
The patient is asymptomatic and is stable from the cardiac standpoint  She can proceed with her surgery as planned

## 2023-01-24 NOTE — ASSESSMENT & PLAN NOTE
The patient has a history of hyperlipidemia, stable  The patient will continue atorvastatin at 20 mg daily

## 2023-01-24 NOTE — PROGRESS NOTES
Assessment/Plan:    Mixed hyperlipidemia  The patient has a history of hyperlipidemia, stable  The patient will continue atorvastatin at 20 mg daily  Bradycardia  The patient had exhibited sinus bradycardia in the past that was asymptomatic  This has apparently resolved the patient's heart rate is in the vicinity of 70 to 80 bpm   And the patient remains asymptomatic  Preop cardiovascular exam  The patient is asymptomatic and is stable from the cardiac standpoint  She can proceed with her surgery as planned  Diagnoses and all orders for this visit:    Preop cardiovascular exam  -     POCT ECG    Mixed hyperlipidemia    Bradycardia          Subjective: Feels rather well  Patient ID: Fátima Day is a 58 y o  female  The patient presented to this office for the purpose of cardiac evaluation and consultation in anticipation of GI surgery  The patient is known to have a history of hyperlipidemia and in the past had been noted to have sinus bradycardia  At this point the patient is feeling rather well  She denies any symptoms of chest pain, shortness of breath, palpitation, dizziness or lightheadedness  She has no leg edema  The patient does not smoke and has rare alcohol  Family history significant for her mother having hypertension she is 80years old  Her father is 80 and has no heart disease  The following portions of the patient's history were reviewed and updated as appropriate: allergies, current medications, past family history, past medical history, past social history, past surgical history and problem list     Review of Systems   Respiratory: Negative for apnea, cough, chest tightness, shortness of breath and wheezing  Cardiovascular: Negative for chest pain, palpitations and leg swelling  Gastrointestinal: Negative for abdominal pain  Neurological: Negative for dizziness and light-headedness  Psychiatric/Behavioral: Negative            Objective: Stable cardiac wise  /78 (BP Location: Left arm, Patient Position: Sitting, Cuff Size: Standard)   Pulse 81   Ht 5' 10 5" (1 791 m)   Wt 125 kg (275 lb 9 6 oz)   SpO2 96%   BMI 38 99 kg/m²          Physical Exam  Vitals reviewed  Constitutional:       General: She is not in acute distress  Appearance: She is well-developed  She is not diaphoretic  HENT:      Head: Normocephalic and atraumatic  Neck:      Thyroid: No thyromegaly  Vascular: No JVD  Cardiovascular:      Rate and Rhythm: Normal rate and regular rhythm  Heart sounds: S1 normal and S2 normal  No murmur heard  No friction rub  No gallop  Pulmonary:      Effort: Pulmonary effort is normal  No respiratory distress  Breath sounds: No wheezing or rales  Chest:      Chest wall: No tenderness  Abdominal:      Palpations: Abdomen is soft  Musculoskeletal:      Cervical back: Normal range of motion and neck supple  Right lower leg: No edema  Left lower leg: No edema  Skin:     General: Skin is warm and dry  Neurological:      Mental Status: She is oriented to person, place, and time     Psychiatric:         Mood and Affect: Mood normal          Behavior: Behavior normal

## 2023-01-24 NOTE — LETTER
January 24, 2023     Negro Goff, [address]    Patient: Yen Davidson   YOB: 1960   Date of Visit: 1/24/2023       Dear Dr Molly Philip: Thank you for referring Ignacio Rahman to me for evaluation  Below are my notes for this consultation  If you have questions, please do not hesitate to call me  I look forward to following your patient along with you  Sincerely,        Colleen Roland MD        CC: Chikis Quach, [address] MD Brian  1/24/2023  1:46 PM  Sign when Signing Visit  Assessment/Plan:    Mixed hyperlipidemia  The patient has a history of hyperlipidemia, stable  The patient will continue atorvastatin at 20 mg daily  Bradycardia  The patient had exhibited sinus bradycardia in the past that was asymptomatic  This has apparently resolved the patient's heart rate is in the vicinity of 70 to 80 bpm   And the patient remains asymptomatic  Preop cardiovascular exam  The patient is asymptomatic and is stable from the cardiac standpoint  She can proceed with her surgery as planned  Diagnoses and all orders for this visit:    Preop cardiovascular exam  -     POCT ECG    Mixed hyperlipidemia    Bradycardia         Subjective: Feels rather well  Patient ID: Yen Davidson is a 58 y o  female  The patient presented to this office for the purpose of cardiac evaluation and consultation in anticipation of GI surgery  The patient is known to have a history of hyperlipidemia and in the past had been noted to have sinus bradycardia  At this point the patient is feeling rather well  She denies any symptoms of chest pain, shortness of breath, palpitation, dizziness or lightheadedness  She has no leg edema  The patient does not smoke and has rare alcohol  Family history significant for her mother having hypertension she is 80years old  Her father is 80 and has no heart disease              The following portions of the patient's history were reviewed and updated as appropriate: allergies, current medications, past family history, past medical history, past social history, past surgical history and problem list     Review of Systems   Respiratory: Negative for apnea, cough, chest tightness, shortness of breath and wheezing  Cardiovascular: Negative for chest pain, palpitations and leg swelling  Gastrointestinal: Negative for abdominal pain  Neurological: Negative for dizziness and light-headedness  Psychiatric/Behavioral: Negative  Objective: Stable cardiac wise  /78 (BP Location: Left arm, Patient Position: Sitting, Cuff Size: Standard)   Pulse 81   Ht 5' 10 5" (1 791 m)   Wt 125 kg (275 lb 9 6 oz)   SpO2 96%   BMI 38 99 kg/m²         Physical Exam  Vitals reviewed  Constitutional:       General: She is not in acute distress  Appearance: She is well-developed  She is not diaphoretic  HENT:      Head: Normocephalic and atraumatic  Neck:      Thyroid: No thyromegaly  Vascular: No JVD  Cardiovascular:      Rate and Rhythm: Normal rate and regular rhythm  Heart sounds: S1 normal and S2 normal  No murmur heard  No friction rub  No gallop  Pulmonary:      Effort: Pulmonary effort is normal  No respiratory distress  Breath sounds: No wheezing or rales  Chest:      Chest wall: No tenderness  Abdominal:      Palpations: Abdomen is soft  Musculoskeletal:      Cervical back: Normal range of motion and neck supple  Right lower leg: No edema  Left lower leg: No edema  Skin:     General: Skin is warm and dry  Neurological:      Mental Status: She is oriented to person, place, and time     Psychiatric:         Mood and Affect: Mood normal          Behavior: Behavior normal

## 2023-01-24 NOTE — ASSESSMENT & PLAN NOTE
The patient had exhibited sinus bradycardia in the past that was asymptomatic  This has apparently resolved the patient's heart rate is in the vicinity of 70 to 80 bpm   And the patient remains asymptomatic  DISPLAY PLAN FREE TEXT

## 2023-02-07 DIAGNOSIS — J40 BRONCHITIS: ICD-10-CM

## 2023-02-07 RX ORDER — ALBUTEROL SULFATE 90 UG/1
AEROSOL, METERED RESPIRATORY (INHALATION)
Qty: 18 G | Refills: 0 | Status: SHIPPED | OUTPATIENT
Start: 2023-02-07

## 2023-02-13 DIAGNOSIS — K20.90 ESOPHAGITIS: ICD-10-CM

## 2023-02-13 RX ORDER — PANTOPRAZOLE SODIUM 40 MG/1
TABLET, DELAYED RELEASE ORAL
Qty: 90 TABLET | Refills: 1 | Status: SHIPPED | OUTPATIENT
Start: 2023-02-13

## 2023-03-23 ENCOUNTER — OFFICE VISIT (OUTPATIENT)
Dept: BARIATRICS | Facility: CLINIC | Age: 63
End: 2023-03-23

## 2023-03-23 VITALS
DIASTOLIC BLOOD PRESSURE: 86 MMHG | TEMPERATURE: 96.5 F | WEIGHT: 275 LBS | BODY MASS INDEX: 38.5 KG/M2 | HEIGHT: 71 IN | SYSTOLIC BLOOD PRESSURE: 130 MMHG | HEART RATE: 94 BPM

## 2023-03-23 DIAGNOSIS — K21.9 GASTROESOPHAGEAL REFLUX DISEASE WITHOUT ESOPHAGITIS: Primary | ICD-10-CM

## 2023-03-23 NOTE — PROGRESS NOTES
OFFICE VISIT - BARIATRIC SURGERY  Simon Doty 58 y o  female MRN: [id number]  Unit/Bed#:  Encounter: [id number]      HPI:  Simon Doty is a 58 y o  female with a known hiatal hernia  She is here today to discuss her surgical options    Subjective   Since her last visit, she has come to decide that she would prefer to have the Robotic Hiatal Hernia Repair with c-TIF  He symptoms have not really improved and she continues to manage with the PPI  She has had all of the appropriate pre-operative studies of EGD, UGIS, and Manometry/24h pH testing (see below for full reports)  Per last visit: Patient complaining of heartburn symptoms including reflux for the past 7 years  Howard Washington is taking protonix 40mg BID with some relief   She denies nausea and vomiting  She has also taken nexium in the past without relief  Her symptoms wake her up at night and notes weird noise deep in chest at night   She notes low acid diet does not help much with symptoms  She does not eat after 5-6pm to help symptoms  Review of Systems   Constitutional: Negative  Respiratory: Negative  Cardiovascular: Negative  Gastrointestinal: Negative          Historical Information   Past Medical History:   Diagnosis Date   • Back pain    • Bradycardia    • Colon polyp    • DDD (degenerative disc disease), lumbar    • DJD (degenerative joint disease)    • Female infertility    • GERD (gastroesophageal reflux disease)    • Hypercholesterolemia    • Obesity    • Uveitis    • Vertigo      Past Surgical History:   Procedure Laterality Date   • CATARACT EXTRACTION, BILATERAL     • COLONOSCOPY      2021   • DILATION AND CURETTAGE OF UTERUS     • HYSTEROSCOPY  01/09/2012   • MYOMECTOMY     • TONSILLECTOMY AND ADENOIDECTOMY       Social History   Social History     Substance and Sexual Activity   Alcohol Use Yes    Comment: socially     Social History     Substance and Sexual Activity   Drug Use Never     Social History     Tobacco Use Smoking Status Never   Smokeless Tobacco Never       Objective       Current Vitals:   Blood Pressure: 130/86 (03/23/23 1514)  Pulse: 94 (03/23/23 1514)  Temperature: (!) 96 5 °F (35 8 °C) (03/23/23 1514)  Temp Source: Tympanic (03/23/23 1514)  Height: 5' 10 5" (179 1 cm) (03/23/23 1514)  Weight - Scale: 125 kg (275 lb) (03/23/23 1514)    Invasive Devices     None                 Physical Exam  Constitutional:       General: She is not in acute distress  Appearance: Normal appearance  She is not toxic-appearing  Cardiovascular:      Rate and Rhythm: Normal rate and regular rhythm  Pulses: Normal pulses  Heart sounds: Normal heart sounds  Pulmonary:      Effort: Pulmonary effort is normal  No respiratory distress  Breath sounds: Normal breath sounds  No wheezing  Abdominal:      General: Abdomen is flat  There is no distension  Palpations: Abdomen is soft  Tenderness: There is no abdominal tenderness  Neurological:      Mental Status: She is alert  Pathology, and Other Studies: I have personally reviewed pertinent reports  and I have personally reviewed pertinent films in PACS       UGI  FINDINGS:     The esophagus is normal in caliber  There is intermittent disruption of the esophageal primary peristaltic stripping wave with otherwise normal motility and relatively prompt emptying of contrast from esophagus into the stomach  There is no mucosal   mass, ulceration or fold thickening identified      There is a small sliding-type hiatal hernia identified  There is moderate frequent gastroesophageal reflux witnessed without evidence for reflux esophagitis or stricture      The stomach is unremarkable in size  The gastric mucosa is normal   No penetrating ulcers or masses      Contrast empties promptly into the duodenum  The duodenum is normal in caliber  The ligament of Treitz/duodenojejunal junction lies in a normal position         IMPRESSION:        1    Mild esophageal dysmotility  2   Small hiatal hernia  3   Moderate frequent gastroesophageal reflux without evidence for reflux esophagitis or esophageal stricture  4   Otherwise unremarkable exam        EGD  IMPRESSION:  1  Slightly irregular Z-line, biopsies obtained to assess for short-segment Quintanilla's  2  Hiatal hernia  3  Diminutive gastric polyps, likely fundic gland, biopsies of the larger polyps were previously obtained and were consistent with fundic gland  MANOMETRY/pH Study  Esophageal manometry  Esophageal motility- 9/10 swallows demonstrate normal esophageal contractibility pattern with mean DCI of 1237 mmHg  s cm  1/10 swallows had failed esophageal contractibility pattern  LES- median IRP is within normal limits  Impedance- 100% complete clearance of liquid bolus swallows     Gastroesophageal junction relative LES measures 3 9 cm  Rapid swallow index is greater than 1     Picher classification- esophageal motility with 3 9 cm hiatal hernia           24 hour pH study- off PPI therapy for 7 days     Acid exposure time is 9% overall  Acid exposure time in upright position is 3%  Acid exposure time in recumbent position is 19 5%  DeMeester score is 42     69 episodes of reflux  64 episodes in upright position- 40 episodes of acid reflux, 23 episodes of weakly acid reflux, 1 episode of weakly alkaline reflux  5 episodes in recumbent position- 4 episodes of acid reflux, 1 episode of weakly acid reflux           Symptom correlation was significant reported symptoms of belching and heartburn        Study significant for acid reflux disease overall        Assessment/PLAN:    Pranav Yang is a 58 y o  female  with a known hiatal hernia who would be a good candidate for surgical repair of the hernia  --------------------------------------------------------------------    - We will plan and schedule her for a Robotic Repair of PEH with a Combined TIF, with the c-TIF to be completed by Dr Glen Garcia   - Cardiac clearance obtained  - Labwork ordered  - Continue to meet with Dieticians and Social Workers  - Follow up as directed            Sandra Moss DO  Bariatric Surgery Fellow  [facility]  3/23/2023  3:28 PM

## 2023-03-31 ENCOUNTER — PREP FOR PROCEDURE (OUTPATIENT)
Dept: GASTROENTEROLOGY | Facility: CLINIC | Age: 63
End: 2023-03-31

## 2023-03-31 ENCOUNTER — TELEPHONE (OUTPATIENT)
Dept: GASTROENTEROLOGY | Facility: MEDICAL CENTER | Age: 63
End: 2023-03-31

## 2023-03-31 DIAGNOSIS — Z01.818 PREOP TESTING: ICD-10-CM

## 2023-03-31 DIAGNOSIS — K44.9 HIATAL HERNIA: Primary | ICD-10-CM

## 2023-03-31 DIAGNOSIS — K21.9 GASTROESOPHAGEAL REFLUX DISEASE, UNSPECIFIED WHETHER ESOPHAGITIS PRESENT: Primary | ICD-10-CM

## 2023-03-31 NOTE — TELEPHONE ENCOUNTER
Spoke to patient and scheduled Consult for TIF discussion with Dr Ramírez Drivers    Pt is aware that we are located @ Entrance [address]

## 2023-04-28 ENCOUNTER — ANESTHESIA EVENT (OUTPATIENT)
Dept: PERIOP | Facility: HOSPITAL | Age: 63
End: 2023-04-28

## 2023-04-28 NOTE — PRE-PROCEDURE INSTRUCTIONS
Pre-Surgery Instructions:   Medication Instructions   • ammonium lactate (LAC-HYDRIN) 12 % cream Hold day of surgery  • atorvastatin (LIPITOR) 20 mg tablet Take night before surgery   • glucosamine-chondroitin 500-400 MG tablet Stop taking 7 days prior to surgery  • Multiple Vitamins-Minerals (MULTIVITAMIN WITH MINERALS) tablet Stop taking 7 days prior to surgery  • pantoprazole (PROTONIX) 40 mg tablet Take day of surgery  Medication instructions for day surgery reviewed  Please use only a sip of water to take your instructed medications  Avoid all over the counter vitamins, supplements and NSAIDS for one week prior to surgery per anesthesia guidelines  Tylenol is ok to take as needed  You will receive a call one business day prior to surgery with an arrival time and hospital directions  If your surgery is scheduled on a Monday, the hospital will be calling you on the Friday prior to your surgery  If you have not heard from anyone by 8pm, please call the hospital supervisor through the hospital  at [phone]  Shira Files [phone])  Do not eat or drink anything after midnight the night before your surgery, including candy, mints, lifesavers, or chewing gum  Follow surgeon's office instructions on timing of consumption of pre-surgical carb drinks  Do not drink alcohol 24hrs before your surgery  Try not to smoke at least 24hrs before your surgery  Follow the pre surgery showering instructions as listed in the [facility] Surgical Experience Booklet” or otherwise provided by your surgeon's office  Do not shave the surgical area 24 hours before surgery  Do not apply any lotions, creams, including makeup, cologne, deodorant, or perfumes after showering on the day of your surgery  No contact lenses, eye make-up, or artificial eyelashes  Remove nail polish, including gel polish, and any artificial, gel, or acrylic nails if possible   Remove all jewelry including rings and body piercing jewelry  Wear causal clothing that is easy to take on and off  Consider your type of surgery  Keep any valuables, jewelry, piercings at home  Please bring any specially ordered equipment (sling, braces) if indicated  Arrange for a responsible person to drive you to and from the hospital on the day of your surgery  Visitor Guidelines discussed  Call the surgeon's office with any new illnesses, exposures, or additional questions prior to surgery  Please reference your [facility] Surgical Experience Booklet” for additional information to prepare for your upcoming surgery

## 2023-05-10 ENCOUNTER — APPOINTMENT (OUTPATIENT)
Dept: LAB | Facility: CLINIC | Age: 63
End: 2023-05-10

## 2023-05-10 DIAGNOSIS — K21.9 GASTROESOPHAGEAL REFLUX DISEASE WITHOUT ESOPHAGITIS: ICD-10-CM

## 2023-05-10 DIAGNOSIS — K44.9 HIATAL HERNIA: ICD-10-CM

## 2023-05-10 DIAGNOSIS — Z01.818 PREOP TESTING: ICD-10-CM

## 2023-05-10 LAB
ALBUMIN SERPL BCP-MCNC: 3.7 G/DL (ref 3.5–5)
ALP SERPL-CCNC: 110 U/L (ref 34–104)
ALT SERPL W P-5'-P-CCNC: 13 U/L (ref 7–52)
ANION GAP SERPL CALCULATED.3IONS-SCNC: 5 MMOL/L (ref 4–13)
AST SERPL W P-5'-P-CCNC: 13 U/L (ref 13–39)
BILIRUB DIRECT SERPL-MCNC: 0.02 MG/DL (ref 0–0.2)
BILIRUB SERPL-MCNC: 0.52 MG/DL (ref 0.2–1)
BUN SERPL-MCNC: 22 MG/DL (ref 5–25)
CALCIUM SERPL-MCNC: 9.7 MG/DL (ref 8.4–10.2)
CHLORIDE SERPL-SCNC: 108 MMOL/L (ref 96–108)
CO2 SERPL-SCNC: 29 MMOL/L (ref 21–32)
CREAT SERPL-MCNC: 0.85 MG/DL (ref 0.6–1.3)
ERYTHROCYTE [DISTWIDTH] IN BLOOD BY AUTOMATED COUNT: 13.9 % (ref 11.6–15.1)
GFR SERPL CREATININE-BSD FRML MDRD: 73 ML/MIN/1.73SQ M
GLUCOSE P FAST SERPL-MCNC: 104 MG/DL (ref 65–99)
HCT VFR BLD AUTO: 40.4 % (ref 34.8–46.1)
HGB BLD-MCNC: 12.9 G/DL (ref 11.5–15.4)
MCH RBC QN AUTO: 30.5 PG (ref 26.8–34.3)
MCHC RBC AUTO-ENTMCNC: 31.9 G/DL (ref 31.4–37.4)
MCV RBC AUTO: 96 FL (ref 82–98)
PLATELET # BLD AUTO: 172 THOUSANDS/UL (ref 149–390)
PMV BLD AUTO: 11.8 FL (ref 8.9–12.7)
POTASSIUM SERPL-SCNC: 4.2 MMOL/L (ref 3.5–5.3)
PROT SERPL-MCNC: 6.7 G/DL (ref 6.4–8.4)
RBC # BLD AUTO: 4.23 MILLION/UL (ref 3.81–5.12)
SODIUM SERPL-SCNC: 142 MMOL/L (ref 135–147)
WBC # BLD AUTO: 4.37 THOUSAND/UL (ref 4.31–10.16)

## 2023-05-11 ENCOUNTER — OFFICE VISIT (OUTPATIENT)
Dept: BARIATRICS | Facility: CLINIC | Age: 63
End: 2023-05-11

## 2023-05-11 VITALS
SYSTOLIC BLOOD PRESSURE: 120 MMHG | HEART RATE: 78 BPM | WEIGHT: 278 LBS | HEIGHT: 71 IN | TEMPERATURE: 97.5 F | DIASTOLIC BLOOD PRESSURE: 80 MMHG | BODY MASS INDEX: 38.92 KG/M2

## 2023-05-11 DIAGNOSIS — K21.00 GASTROESOPHAGEAL REFLUX DISEASE WITH ESOPHAGITIS WITHOUT HEMORRHAGE: Primary | ICD-10-CM

## 2023-05-11 RX ORDER — ACETAMINOPHEN 325 MG/1
975 TABLET ORAL ONCE
OUTPATIENT
Start: 2023-05-22 | End: 2023-05-11

## 2023-05-11 RX ORDER — CEFAZOLIN SODIUM 2 G/50ML
2000 SOLUTION INTRAVENOUS ONCE
OUTPATIENT
Start: 2023-05-22 | End: 2023-05-11

## 2023-05-11 RX ORDER — COVID-19 ANTIGEN TEST
KIT MISCELLANEOUS
COMMUNITY
Start: 2023-04-27

## 2023-05-11 RX ORDER — CELECOXIB 200 MG/1
200 CAPSULE ORAL ONCE
OUTPATIENT
Start: 2023-05-22 | End: 2023-05-11

## 2023-05-11 RX ORDER — SCOLOPAMINE TRANSDERMAL SYSTEM 1 MG/1
1 PATCH, EXTENDED RELEASE TRANSDERMAL ONCE
OUTPATIENT
Start: 2023-05-22 | End: 2023-05-11

## 2023-05-11 RX ORDER — HEPARIN SODIUM 5000 [USP'U]/ML
5000 INJECTION, SOLUTION INTRAVENOUS; SUBCUTANEOUS
OUTPATIENT
Start: 2023-05-23 | End: 2023-05-24

## 2023-05-11 NOTE — PROGRESS NOTES
"HISTORY AND PHYSICAL - FOREGUT SURGERY  Marybeth Travis 58 y o  female MRN: [id number]  Unit/Bed#:  Encounter: [id number]      HPI:  Marybeth Travis is a 58 y o  female who presents to review their preoperative workup and see if they are a good candidate to undergo a foregut procedure  Denies fevers, chills, n/v/d, chest pain, SOB, headaches, blurred vision, and dizziness    Historical Information   Past Medical History:   Diagnosis Date   • Back pain    • Bradycardia    • Colon polyp    • DDD (degenerative disc disease), lumbar    • DJD (degenerative joint disease)    • Female infertility    • GERD (gastroesophageal reflux disease)    • History of COVID-19 12/2021    mild cold s/s   • History of transfusion    • Hypercholesterolemia    • Obesity    • Paraesophageal hernia    • Uveitis    • Vertigo      Past Surgical History:   Procedure Laterality Date   • CATARACT EXTRACTION, BILATERAL     • COLONOSCOPY      2021   • DILATION AND CURETTAGE OF UTERUS     • EGD     • HYSTEROSCOPY  01/09/2012   • MYOMECTOMY     • TONSILLECTOMY AND ADENOIDECTOMY       Social History   Social History     Substance and Sexual Activity   Alcohol Use Yes    Comment: 1 x per week     Social History     Substance and Sexual Activity   Drug Use Never     Social History     Tobacco Use   Smoking Status Never   Smokeless Tobacco Never     Family History: non-contributory    Meds/Allergies   PTA meds:   Cannot display prior to admission medications because the patient has not been admitted in this contact  Allergies   Allergen Reactions   • Neomycin-Bacitracin Zn-Polymyx Rash       Objective     Current Vitals:   Blood Pressure: 120/80 (05/11/23 1504)  Pulse: 78 (05/11/23 1504)  Temperature: 97 5 °F (36 4 °C) (05/11/23 1504)  Height: 5' 10 5\" (179 1 cm) (05/11/23 1504)  Weight - Scale: 126 kg (278 lb) (05/11/23 1504)    Physical Exam  Constitutional:       General: She is not in acute distress  Appearance: Normal appearance   She " is not toxic-appearing  Cardiovascular:      Rate and Rhythm: Normal rate and regular rhythm  Pulses: Normal pulses  Heart sounds: Normal heart sounds  Pulmonary:      Effort: Pulmonary effort is normal  No respiratory distress  Breath sounds: Normal breath sounds  No wheezing  Abdominal:      General: Abdomen is flat  There is no distension  Palpations: Abdomen is soft  Tenderness: There is no abdominal tenderness  Neurological:      Mental Status: She is alert  Awake, alert, oriented, no distress  Normal work of breathing, lungs clear to auscultation  Regular rate and rhythm  Abd obese, non tender, non distended, no masses or defects  Moves all extremities  Normal strength and ROM  No edema  Normal affect    Lab Results: I have personally reviewed pertinent lab results  Imaging: I have personally reviewed pertinent reports  EKG, Pathology, and Other Studies: I have personally reviewed pertinent reports  IMPRESSION:  1  Slightly irregular Z-line, biopsies obtained to assess for short-segment Quintanilla's  2  Hiatal hernia     3  Diminutive gastric polyps, likely fundic gland, biopsies of the larger polyps were previously obtained and were consistent with fundic gland  Assessment/Plan:    The patient presented to review the preoperative workup and see if bariatric surgery is appropriate and indicated following the extensive preoperative workup and the enrollment in our weight loss program   Preoperative workup was complete  Results were reviewed with the patient including the blood work results and the endoscopy findings and the biopsy results  We also reviewed the cardiology evaluation      The patient was determined to be a good candidate for Robotic HHR with c-TIF   ----------------------------------------------------------------------  Smoking: No  Blood thinner use: No  Home pain medication use and who manages it: No  CPAP use: No (If yes, sleep study obtained and reminded pt to bring CPAP to hospital)  History of blood clots: No  Previous abdominal surgeries: Myomectomy  Cardiac clearance obtained: Yes   EKG performed: 1/24  EGD prior to surgery: 8/17/21  Screening Labs obtained (CBC, CMP): 5/10  H  Pylori status: Negative  Medication allergies: Neosporin  SLIM consult Post-Op: No  Reminded patient about 2 week pre-op liquid diet: No  10% body weight loss pre-operatively met/discussed: N/a  Consent signed: Yes  Pre-Op ERAS Orders placed: Yes  -----------------------------------------------------------------------  Risks and benefits explained one more time to the patient  Alternatives to surgery and alternative forms of surgery were also explained  Post-surgical commitment and after care programs were explained  We also discussed that the [employer] robot may be available to us to use on the day of the patient procedure and that the procedure may be performed robotically  I discussed in details the advantages and disadvantages of this approach including the potential decrease in postoperative pain because of the remote center technology  I also mentioned the lack of strong evidence for the use of robot in bariatric patients and the potential disadvantage to patients because of the prolonged operative time  Consent was signed  Questions were answered and concerns were addressed  Patient will need to start the 2 week liquid diet prior to surgery  Patient wishes to proceed  As per [facility] guidelines, I had a discussion with the patient regarding their CODE STATUS in the perioperative period and the patient is level 1 or FULL CODE STATUS      Mike Sahni DO  Bariatric Surgery Fellow     5/11/2023  3:13 PM

## 2023-05-11 NOTE — H&P (VIEW-ONLY)
"HISTORY AND PHYSICAL - FOREGUT SURGERY  Adilia Haney 58 y o  female MRN: [id number]  Unit/Bed#:  Encounter: [id number]      HPI:  Adilia Haney is a 58 y o  female who presents to review their preoperative workup and see if they are a good candidate to undergo a foregut procedure  Denies fevers, chills, n/v/d, chest pain, SOB, headaches, blurred vision, and dizziness    Historical Information   Past Medical History:   Diagnosis Date   • Back pain    • Bradycardia    • Colon polyp    • DDD (degenerative disc disease), lumbar    • DJD (degenerative joint disease)    • Female infertility    • GERD (gastroesophageal reflux disease)    • History of COVID-19 12/2021    mild cold s/s   • History of transfusion    • Hypercholesterolemia    • Obesity    • Paraesophageal hernia    • Uveitis    • Vertigo      Past Surgical History:   Procedure Laterality Date   • CATARACT EXTRACTION, BILATERAL     • COLONOSCOPY      2021   • DILATION AND CURETTAGE OF UTERUS     • EGD     • HYSTEROSCOPY  01/09/2012   • MYOMECTOMY     • TONSILLECTOMY AND ADENOIDECTOMY       Social History   Social History     Substance and Sexual Activity   Alcohol Use Yes    Comment: 1 x per week     Social History     Substance and Sexual Activity   Drug Use Never     Social History     Tobacco Use   Smoking Status Never   Smokeless Tobacco Never     Family History: non-contributory    Meds/Allergies   PTA meds:   Cannot display prior to admission medications because the patient has not been admitted in this contact  Allergies   Allergen Reactions   • Neomycin-Bacitracin Zn-Polymyx Rash       Objective     Current Vitals:   Blood Pressure: 120/80 (05/11/23 1504)  Pulse: 78 (05/11/23 1504)  Temperature: 97 5 °F (36 4 °C) (05/11/23 1504)  Height: 5' 10 5\" (179 1 cm) (05/11/23 1504)  Weight - Scale: 126 kg (278 lb) (05/11/23 1504)    Physical Exam  Constitutional:       General: She is not in acute distress  Appearance: Normal appearance   She " is not toxic-appearing  Cardiovascular:      Rate and Rhythm: Normal rate and regular rhythm  Pulses: Normal pulses  Heart sounds: Normal heart sounds  Pulmonary:      Effort: Pulmonary effort is normal  No respiratory distress  Breath sounds: Normal breath sounds  No wheezing  Abdominal:      General: Abdomen is flat  There is no distension  Palpations: Abdomen is soft  Tenderness: There is no abdominal tenderness  Neurological:      Mental Status: She is alert  Awake, alert, oriented, no distress  Normal work of breathing, lungs clear to auscultation  Regular rate and rhythm  Abd obese, non tender, non distended, no masses or defects  Moves all extremities  Normal strength and ROM  No edema  Normal affect    Lab Results: I have personally reviewed pertinent lab results  Imaging: I have personally reviewed pertinent reports  EKG, Pathology, and Other Studies: I have personally reviewed pertinent reports  IMPRESSION:  1  Slightly irregular Z-line, biopsies obtained to assess for short-segment Quintanilla's  2  Hiatal hernia     3  Diminutive gastric polyps, likely fundic gland, biopsies of the larger polyps were previously obtained and were consistent with fundic gland  Assessment/Plan:    The patient presented to review the preoperative workup and see if bariatric surgery is appropriate and indicated following the extensive preoperative workup and the enrollment in our weight loss program   Preoperative workup was complete  Results were reviewed with the patient including the blood work results and the endoscopy findings and the biopsy results  We also reviewed the cardiology evaluation      The patient was determined to be a good candidate for Robotic HHR with c-TIF   ----------------------------------------------------------------------  Smoking: No  Blood thinner use: No  Home pain medication use and who manages it: No  CPAP use: No (If yes, sleep study obtained and reminded pt to bring CPAP to hospital)  History of blood clots: No  Previous abdominal surgeries: Myomectomy  Cardiac clearance obtained: Yes   EKG performed: 1/24  EGD prior to surgery: 8/17/21  Screening Labs obtained (CBC, CMP): 5/10  H  Pylori status: Negative  Medication allergies: Neosporin  SLIM consult Post-Op: No  Reminded patient about 2 week pre-op liquid diet: No  10% body weight loss pre-operatively met/discussed: N/a  Consent signed: Yes  Pre-Op ERAS Orders placed: Yes  -----------------------------------------------------------------------  Risks and benefits explained one more time to the patient  Alternatives to surgery and alternative forms of surgery were also explained  Post-surgical commitment and after care programs were explained  We also discussed that the [employer] robot may be available to us to use on the day of the patient procedure and that the procedure may be performed robotically  I discussed in details the advantages and disadvantages of this approach including the potential decrease in postoperative pain because of the remote center technology  I also mentioned the lack of strong evidence for the use of robot in bariatric patients and the potential disadvantage to patients because of the prolonged operative time  Consent was signed  Questions were answered and concerns were addressed  Patient will need to start the 2 week liquid diet prior to surgery  Patient wishes to proceed  As per [facility] guidelines, I had a discussion with the patient regarding their CODE STATUS in the perioperative period and the patient is level 1 or FULL CODE STATUS      Seven Ge DO  Bariatric Surgery Fellow     5/11/2023  3:13 PM ambulatory

## 2023-05-12 DIAGNOSIS — K21.00 GASTROESOPHAGEAL REFLUX DISEASE WITH ESOPHAGITIS WITHOUT HEMORRHAGE: Primary | ICD-10-CM

## 2023-05-12 RX ORDER — OXYCODONE HYDROCHLORIDE 5 MG/1
5 TABLET ORAL EVERY 4 HOURS PRN
Qty: 10 TABLET | Refills: 0 | Status: SHIPPED | OUTPATIENT
Start: 2023-05-23

## 2023-05-12 RX ORDER — OMEPRAZOLE 20 MG/1
20 CAPSULE, DELAYED RELEASE ORAL DAILY
Qty: 30 CAPSULE | Refills: 3 | Status: SHIPPED | OUTPATIENT
Start: 2023-05-12

## 2023-05-12 NOTE — TELEPHONE ENCOUNTER
PO Meds  For Laparoscopic  Paraesophageal  Hernia repair with  Robotics on 5/22/2023 with Dr Alexandrea Cancino           Allergies ** NEOMYCIN - BACITRACIN ZN-POLYMYX

## 2023-05-22 ENCOUNTER — ANESTHESIA (OUTPATIENT)
Dept: PERIOP | Facility: HOSPITAL | Age: 63
End: 2023-05-22

## 2023-05-22 ENCOUNTER — HOSPITAL ENCOUNTER (OUTPATIENT)
Facility: HOSPITAL | Age: 63
Setting detail: OUTPATIENT SURGERY
Discharge: HOME | End: 2023-05-23
Attending: SURGERY | Admitting: SURGERY

## 2023-05-22 ENCOUNTER — APPOINTMENT (OUTPATIENT)
Dept: PERIOP | Facility: HOSPITAL | Age: 63
End: 2023-05-22
Attending: INTERNAL MEDICINE

## 2023-05-22 DIAGNOSIS — R12 HEARTBURN: ICD-10-CM

## 2023-05-22 DIAGNOSIS — K21.9 GASTROESOPHAGEAL REFLUX DISEASE: Primary | ICD-10-CM

## 2023-05-22 DIAGNOSIS — K21.9 GASTROESOPHAGEAL REFLUX DISEASE, UNSPECIFIED WHETHER ESOPHAGITIS PRESENT: ICD-10-CM

## 2023-05-22 DIAGNOSIS — K44.9 HIATAL HERNIA: ICD-10-CM

## 2023-05-22 DEVICE — SINGLE KIT CONSISTS OF 1 EACH R2007 ESOPHYX Z+ FASTENER DELIVERY DEVICE AND 1 EACH R2375 SEROSAFUSE IMPLANTABLE FASTENER CARTRIDGE
Type: IMPLANTABLE DEVICE | Site: ESOPHAGUS | Status: FUNCTIONAL
Brand: SEROSAFUSE® IMPLANTABLE FASTENER KIT

## 2023-05-22 RX ORDER — ACETAMINOPHEN 325 MG/1
975 TABLET ORAL ONCE
Status: COMPLETED | OUTPATIENT
Start: 2023-05-22 | End: 2023-05-22

## 2023-05-22 RX ORDER — SCOLOPAMINE TRANSDERMAL SYSTEM 1 MG/1
1 PATCH, EXTENDED RELEASE TRANSDERMAL ONCE
Status: DISCONTINUED | OUTPATIENT
Start: 2023-05-22 | End: 2023-05-23 | Stop reason: HOSPADM

## 2023-05-22 RX ORDER — METOCLOPRAMIDE HYDROCHLORIDE 5 MG/ML
10 INJECTION INTRAMUSCULAR; INTRAVENOUS EVERY 6 HOURS PRN
Status: DISCONTINUED | OUTPATIENT
Start: 2023-05-22 | End: 2023-05-23 | Stop reason: HOSPADM

## 2023-05-22 RX ORDER — CEFAZOLIN SODIUM 1 G/3ML
INJECTION, POWDER, FOR SOLUTION INTRAMUSCULAR; INTRAVENOUS AS NEEDED
Status: DISCONTINUED | OUTPATIENT
Start: 2023-05-22 | End: 2023-05-22

## 2023-05-22 RX ORDER — HYDROMORPHONE HCL/PF 1 MG/ML
SYRINGE (ML) INJECTION AS NEEDED
Status: DISCONTINUED | OUTPATIENT
Start: 2023-05-22 | End: 2023-05-22

## 2023-05-22 RX ORDER — SIMETHICONE 80 MG
80 TABLET,CHEWABLE ORAL 4 TIMES DAILY PRN
Status: DISCONTINUED | OUTPATIENT
Start: 2023-05-22 | End: 2023-05-23 | Stop reason: HOSPADM

## 2023-05-22 RX ORDER — HYDROMORPHONE HCL/PF 1 MG/ML
0.5 SYRINGE (ML) INJECTION
Status: DISCONTINUED | OUTPATIENT
Start: 2023-05-22 | End: 2023-05-22 | Stop reason: HOSPADM

## 2023-05-22 RX ORDER — MORPHINE SULFATE 4 MG/ML
4 INJECTION, SOLUTION INTRAMUSCULAR; INTRAVENOUS EVERY 4 HOURS PRN
Status: DISCONTINUED | OUTPATIENT
Start: 2023-05-22 | End: 2023-05-23 | Stop reason: HOSPADM

## 2023-05-22 RX ORDER — FENTANYL CITRATE/PF 50 MCG/ML
25 SYRINGE (ML) INJECTION
Status: DISCONTINUED | OUTPATIENT
Start: 2023-05-22 | End: 2023-05-22 | Stop reason: HOSPADM

## 2023-05-22 RX ORDER — PROPOFOL 10 MG/ML
INJECTION, EMULSION INTRAVENOUS AS NEEDED
Status: DISCONTINUED | OUTPATIENT
Start: 2023-05-22 | End: 2023-05-22

## 2023-05-22 RX ORDER — ATORVASTATIN CALCIUM 20 MG/1
20 TABLET, FILM COATED ORAL EVERY EVENING
Status: DISCONTINUED | OUTPATIENT
Start: 2023-05-22 | End: 2023-05-23 | Stop reason: HOSPADM

## 2023-05-22 RX ORDER — BUPIVACAINE HYDROCHLORIDE AND EPINEPHRINE 5; 5 MG/ML; UG/ML
INJECTION, SOLUTION PERINEURAL AS NEEDED
Status: DISCONTINUED | OUTPATIENT
Start: 2023-05-22 | End: 2023-05-22 | Stop reason: HOSPADM

## 2023-05-22 RX ORDER — SODIUM CHLORIDE, SODIUM LACTATE, POTASSIUM CHLORIDE, CALCIUM CHLORIDE 600; 310; 30; 20 MG/100ML; MG/100ML; MG/100ML; MG/100ML
125 INJECTION, SOLUTION INTRAVENOUS CONTINUOUS
Status: DISCONTINUED | OUTPATIENT
Start: 2023-05-22 | End: 2023-05-22 | Stop reason: SDUPTHER

## 2023-05-22 RX ORDER — ONDANSETRON 2 MG/ML
4 INJECTION INTRAMUSCULAR; INTRAVENOUS ONCE AS NEEDED
Status: DISCONTINUED | OUTPATIENT
Start: 2023-05-22 | End: 2023-05-22 | Stop reason: HOSPADM

## 2023-05-22 RX ORDER — SODIUM CHLORIDE, SODIUM LACTATE, POTASSIUM CHLORIDE, CALCIUM CHLORIDE 600; 310; 30; 20 MG/100ML; MG/100ML; MG/100ML; MG/100ML
100 INJECTION, SOLUTION INTRAVENOUS CONTINUOUS
Status: DISCONTINUED | OUTPATIENT
Start: 2023-05-22 | End: 2023-05-23 | Stop reason: HOSPADM

## 2023-05-22 RX ORDER — DIPHENHYDRAMINE HCL 25 MG
25 TABLET ORAL
Status: DISCONTINUED | OUTPATIENT
Start: 2023-05-22 | End: 2023-05-23 | Stop reason: HOSPADM

## 2023-05-22 RX ORDER — SODIUM CHLORIDE 9 MG/ML
INJECTION, SOLUTION INTRAVENOUS CONTINUOUS PRN
Status: DISCONTINUED | OUTPATIENT
Start: 2023-05-22 | End: 2023-05-22

## 2023-05-22 RX ORDER — FENTANYL CITRATE 50 UG/ML
INJECTION, SOLUTION INTRAMUSCULAR; INTRAVENOUS AS NEEDED
Status: DISCONTINUED | OUTPATIENT
Start: 2023-05-22 | End: 2023-05-22

## 2023-05-22 RX ORDER — ACETAMINOPHEN 160 MG/5ML
975 SUSPENSION ORAL EVERY 8 HOURS SCHEDULED
Status: DISCONTINUED | OUTPATIENT
Start: 2023-05-22 | End: 2023-05-23 | Stop reason: HOSPADM

## 2023-05-22 RX ORDER — ONDANSETRON 2 MG/ML
INJECTION INTRAMUSCULAR; INTRAVENOUS AS NEEDED
Status: DISCONTINUED | OUTPATIENT
Start: 2023-05-22 | End: 2023-05-22

## 2023-05-22 RX ORDER — MAGNESIUM HYDROXIDE 1200 MG/15ML
LIQUID ORAL AS NEEDED
Status: DISCONTINUED | OUTPATIENT
Start: 2023-05-22 | End: 2023-05-22 | Stop reason: HOSPADM

## 2023-05-22 RX ORDER — PROPOFOL 10 MG/ML
INJECTION, EMULSION INTRAVENOUS CONTINUOUS PRN
Status: DISCONTINUED | OUTPATIENT
Start: 2023-05-22 | End: 2023-05-22

## 2023-05-22 RX ORDER — HEPARIN SODIUM 5000 [USP'U]/ML
5000 INJECTION, SOLUTION INTRAVENOUS; SUBCUTANEOUS
Status: COMPLETED | OUTPATIENT
Start: 2023-05-22 | End: 2023-05-22

## 2023-05-22 RX ORDER — ACETAMINOPHEN 325 MG/1
975 TABLET ORAL EVERY 8 HOURS SCHEDULED
Status: DISCONTINUED | OUTPATIENT
Start: 2023-05-22 | End: 2023-05-23 | Stop reason: HOSPADM

## 2023-05-22 RX ORDER — SODIUM CHLORIDE, SODIUM LACTATE, POTASSIUM CHLORIDE, CALCIUM CHLORIDE 600; 310; 30; 20 MG/100ML; MG/100ML; MG/100ML; MG/100ML
INJECTION, SOLUTION INTRAVENOUS CONTINUOUS PRN
Status: DISCONTINUED | OUTPATIENT
Start: 2023-05-22 | End: 2023-05-22

## 2023-05-22 RX ORDER — OXYCODONE HCL 5 MG/5 ML
5 SOLUTION, ORAL ORAL EVERY 4 HOURS PRN
Status: DISCONTINUED | OUTPATIENT
Start: 2023-05-22 | End: 2023-05-23 | Stop reason: HOSPADM

## 2023-05-22 RX ORDER — MEPERIDINE HYDROCHLORIDE 25 MG/ML
12.5 INJECTION INTRAMUSCULAR; INTRAVENOUS; SUBCUTANEOUS
Status: DISCONTINUED | OUTPATIENT
Start: 2023-05-22 | End: 2023-05-22 | Stop reason: HOSPADM

## 2023-05-22 RX ORDER — CEFAZOLIN SODIUM 2 G/50ML
2000 SOLUTION INTRAVENOUS ONCE
Status: COMPLETED | OUTPATIENT
Start: 2023-05-22 | End: 2023-05-22

## 2023-05-22 RX ORDER — ROCURONIUM BROMIDE 10 MG/ML
INJECTION, SOLUTION INTRAVENOUS AS NEEDED
Status: DISCONTINUED | OUTPATIENT
Start: 2023-05-22 | End: 2023-05-22

## 2023-05-22 RX ORDER — PROMETHAZINE HYDROCHLORIDE 25 MG/ML
25 INJECTION, SOLUTION INTRAMUSCULAR; INTRAVENOUS EVERY 6 HOURS PRN
Status: DISCONTINUED | OUTPATIENT
Start: 2023-05-22 | End: 2023-05-23 | Stop reason: HOSPADM

## 2023-05-22 RX ORDER — DEXAMETHASONE SODIUM PHOSPHATE 10 MG/ML
INJECTION, SOLUTION INTRAMUSCULAR; INTRAVENOUS AS NEEDED
Status: DISCONTINUED | OUTPATIENT
Start: 2023-05-22 | End: 2023-05-22

## 2023-05-22 RX ORDER — OXYCODONE HCL 5 MG/5 ML
10 SOLUTION, ORAL ORAL EVERY 4 HOURS PRN
Status: DISCONTINUED | OUTPATIENT
Start: 2023-05-22 | End: 2023-05-23 | Stop reason: HOSPADM

## 2023-05-22 RX ORDER — CELECOXIB 200 MG/1
200 CAPSULE ORAL ONCE
Status: COMPLETED | OUTPATIENT
Start: 2023-05-22 | End: 2023-05-22

## 2023-05-22 RX ORDER — MIDAZOLAM HYDROCHLORIDE 2 MG/2ML
INJECTION, SOLUTION INTRAMUSCULAR; INTRAVENOUS AS NEEDED
Status: DISCONTINUED | OUTPATIENT
Start: 2023-05-22 | End: 2023-05-22

## 2023-05-22 RX ORDER — ONDANSETRON 2 MG/ML
4 INJECTION INTRAMUSCULAR; INTRAVENOUS EVERY 6 HOURS
Status: DISCONTINUED | OUTPATIENT
Start: 2023-05-22 | End: 2023-05-23 | Stop reason: HOSPADM

## 2023-05-22 RX ORDER — FAMOTIDINE 10 MG/ML
20 INJECTION, SOLUTION INTRAVENOUS EVERY 12 HOURS SCHEDULED
Status: DISCONTINUED | OUTPATIENT
Start: 2023-05-22 | End: 2023-05-23 | Stop reason: HOSPADM

## 2023-05-22 RX ADMIN — ONDANSETRON 4 MG: 2 INJECTION INTRAMUSCULAR; INTRAVENOUS at 20:44

## 2023-05-22 RX ADMIN — PROPOFOL 200 MG: 10 INJECTION, EMULSION INTRAVENOUS at 13:01

## 2023-05-22 RX ADMIN — CEFAZOLIN 1000 MG: 1 INJECTION, POWDER, FOR SOLUTION INTRAMUSCULAR; INTRAVENOUS at 13:05

## 2023-05-22 RX ADMIN — SUGAMMADEX 200 MG: 100 INJECTION, SOLUTION INTRAVENOUS at 16:24

## 2023-05-22 RX ADMIN — ACETAMINOPHEN 325MG 975 MG: 325 TABLET ORAL at 11:29

## 2023-05-22 RX ADMIN — HEPARIN SODIUM 5000 UNITS: 5000 INJECTION INTRAVENOUS; SUBCUTANEOUS at 11:29

## 2023-05-22 RX ADMIN — ROCURONIUM BROMIDE 10 MG: 10 INJECTION, SOLUTION INTRAVENOUS at 15:05

## 2023-05-22 RX ADMIN — FENTANYL CITRATE 100 MCG: 50 INJECTION INTRAMUSCULAR; INTRAVENOUS at 15:46

## 2023-05-22 RX ADMIN — SODIUM CHLORIDE, SODIUM LACTATE, POTASSIUM CHLORIDE, AND CALCIUM CHLORIDE 100 ML/HR: .6; .31; .03; .02 INJECTION, SOLUTION INTRAVENOUS at 18:09

## 2023-05-22 RX ADMIN — ROCURONIUM BROMIDE 10 MG: 10 INJECTION, SOLUTION INTRAVENOUS at 14:25

## 2023-05-22 RX ADMIN — ONDANSETRON 4 MG: 2 INJECTION INTRAMUSCULAR; INTRAVENOUS at 16:24

## 2023-05-22 RX ADMIN — ROCURONIUM BROMIDE 10 MG: 10 INJECTION, SOLUTION INTRAVENOUS at 13:33

## 2023-05-22 RX ADMIN — FAMOTIDINE 20 MG: 10 INJECTION INTRAVENOUS at 20:44

## 2023-05-22 RX ADMIN — FENTANYL CITRATE 100 MCG: 50 INJECTION INTRAMUSCULAR; INTRAVENOUS at 13:00

## 2023-05-22 RX ADMIN — DEXAMETHASONE SODIUM PHOSPHATE 10 MG: 10 INJECTION INTRAMUSCULAR; INTRAVENOUS at 13:05

## 2023-05-22 RX ADMIN — ATORVASTATIN CALCIUM 20 MG: 20 TABLET, FILM COATED ORAL at 18:43

## 2023-05-22 RX ADMIN — MIDAZOLAM 2 MG: 1 INJECTION INTRAMUSCULAR; INTRAVENOUS at 12:50

## 2023-05-22 RX ADMIN — SCOPALAMINE 1 PATCH: 1 PATCH, EXTENDED RELEASE TRANSDERMAL at 11:29

## 2023-05-22 RX ADMIN — SODIUM CHLORIDE, SODIUM LACTATE, POTASSIUM CHLORIDE, AND CALCIUM CHLORIDE: .6; .31; .03; .02 INJECTION, SOLUTION INTRAVENOUS at 13:43

## 2023-05-22 RX ADMIN — SODIUM CHLORIDE, SODIUM LACTATE, POTASSIUM CHLORIDE, AND CALCIUM CHLORIDE 125 ML/HR: .6; .31; .03; .02 INJECTION, SOLUTION INTRAVENOUS at 16:41

## 2023-05-22 RX ADMIN — CEFAZOLIN SODIUM 2000 MG: 2 SOLUTION INTRAVENOUS at 13:05

## 2023-05-22 RX ADMIN — ROCURONIUM BROMIDE 50 MG: 10 INJECTION, SOLUTION INTRAVENOUS at 13:01

## 2023-05-22 RX ADMIN — HYDROMORPHONE HYDROCHLORIDE 0.5 MG: 1 INJECTION, SOLUTION INTRAMUSCULAR; INTRAVENOUS; SUBCUTANEOUS at 15:09

## 2023-05-22 RX ADMIN — SODIUM CHLORIDE, SODIUM LACTATE, POTASSIUM CHLORIDE, AND CALCIUM CHLORIDE 125 ML/HR: .6; .31; .03; .02 INJECTION, SOLUTION INTRAVENOUS at 11:31

## 2023-05-22 RX ADMIN — PROPOFOL 50 MCG/KG/MIN: 10 INJECTION, EMULSION INTRAVENOUS at 13:05

## 2023-05-22 RX ADMIN — SODIUM CHLORIDE: 0.9 INJECTION, SOLUTION INTRAVENOUS at 12:49

## 2023-05-22 RX ADMIN — ROCURONIUM BROMIDE 20 MG: 10 INJECTION, SOLUTION INTRAVENOUS at 13:57

## 2023-05-22 RX ADMIN — CELECOXIB 200 MG: 200 CAPSULE ORAL at 11:29

## 2023-05-22 RX ADMIN — HYDROMORPHONE HYDROCHLORIDE 0.5 MG: 1 INJECTION, SOLUTION INTRAMUSCULAR; INTRAVENOUS; SUBCUTANEOUS at 13:31

## 2023-05-22 RX ADMIN — ACETAMINOPHEN 975 MG: 325 SUSPENSION ORAL at 18:29

## 2023-05-22 NOTE — ANESTHESIA PREPROCEDURE EVALUATION
Procedure:  PARAESOPHAGEAL HERNIA REPAIR W/ ROBOT (Abdomen)    Relevant Problems   CARDIO   (+) Mixed hyperlipidemia      GI/HEPATIC   (+) GERD (gastroesophageal reflux disease)   (+) Paraesophageal hernia      MUSCULOSKELETAL   (+) Paraesophageal hernia      Endocrine   (+) IFG (impaired fasting glucose)      Other   (+) Bradycardia   (+) Obesity, Class II, BMI 35-39 9        Physical Exam    Airway    Mallampati score: II  TM Distance: >3 FB  Neck ROM: full     Dental   No notable dental hx     Cardiovascular  Rhythm: regular, Rate: normal, Cardiovascular exam normal    Pulmonary  Pulmonary exam normal Breath sounds clear to auscultation,     Other Findings        Anesthesia Plan  ASA Score- 2     Anesthesia Type- general with ASA Monitors  Additional Monitors:   Airway Plan: ETT  Plan Factors-    Chart reviewed  Existing labs reviewed  Patient summary reviewed  Patient is not a current smoker  Patient not instructed to abstain from smoking on day of procedure  Patient did not smoke on day of surgery  There is medical exclusion for perioperative obstructive sleep apnea risk education  Induction-     Postoperative Plan-     Informed Consent- Anesthetic plan and risks discussed with patient

## 2023-05-22 NOTE — INTERVAL H&P NOTE
H&P reviewed  After examining the patient I find no changes in the patients condition since the H&P had been written      Vitals:    05/22/23 1136   BP: 126/69   Pulse: 74   Resp: 18   Temp: 97 8 °F (36 6 °C)   SpO2: 96%

## 2023-05-22 NOTE — OR NURSING
Dr Shama Lucas finished his portion of procedure at [phone]  Dr Daphne Pineda performed preoperative timeout for his continuation and portion TIF of the entire procedure at 5902

## 2023-05-22 NOTE — ANESTHESIA POSTPROCEDURE EVALUATION
"Post-Op Assessment Note    CV Status:  Stable  Pain Score: 1    Pain management: adequate     Mental Status:  Alert and awake   Hydration Status:  Euvolemic   PONV Controlled:  Controlled   Airway Patency:  Patent      Post Op Vitals Reviewed: Yes      Staff: Anesthesiologist         No notable events documented      BP      Temp      Pulse     Resp      SpO2      /83   Pulse 68   Temp 97 8 °F (36 6 °C)   Resp 16   Ht 5' 10\" (1 778 m)   Wt 124 kg (274 lb 4 oz)   SpO2 93%   BMI 39 35 kg/m²     "

## 2023-05-22 NOTE — OP NOTE
OPERATIVE REPORT  PATIENT NAME: Nolberto Bajwa    :  1960  MRN: [id number]  Pt Location: [facility] OR ROOM 08    SURGERY DATE: 2023    Surgeon(s) and Role:     * Melchor Fleischer, MD - Primary     * Natacha Greco DO - Assisting    Preop Diagnosis:  Gastroesophageal reflux disease K21 9  Hiatal hernia K44 9  Heartburn R12    Post-Op Diagnosis Codes:     * Gastroesophageal reflux disease [K21 9]     * Hiatal hernia [K44 9]     * Heartburn [R12]    Procedure(s):  PARAESOPHAGEAL HERNIA REPAIR W/ ROBOT AND TIF    Specimen(s):  ID Type Source Tests Collected by Time Destination   1 : Hernia sac Tissue Soft Tissue, Other TISSUE EXAM Melchor Fleischer, MD 2023 1503        Estimated Blood Loss:   20 ml    Drains:  * No LDAs found *    Anesthesia Type:   General    Operative Indications:  Gastroesophageal reflux disease K21 9  Hiatal hernia K44 9  Heartburn R12      Operative Findings:  Large paraesophageal hernia    Complications:   None    Procedure and Technique:  Robotic paraesophageal hernia repair without mesh  Transoral fundoplication (this will be dictated separately by Dr Khloe Demarco)   I was present for the entire procedure  Patient Disposition:  hemodynamically stable       No qualified resident available to assist  Assistance was necessary for traction counter traction and assistance with stapling and intraop endoscopy  Description of the procedure: The patient was brought to the operating room and placed in a supine position  The patient received a dose of IV antibiotics and a dose of subcutaneous Lovenox, prior to the procedure  The patient was induced under general endotracheal anesthesia  The abdominal wall was prepped and draped under sterile conditions in the usual fashion   The procedure was started by obtaining access to the abdominal cavity using a Veress needle to the left side of the midline around 6 inches from the xiphoid process the abdominal cavity was insufflated with CO2 to a pressure of 15 mmHg  After that, the abdomen was entered with an 8 mm trocar using an Optiview trocar under direct visualization  At that point, two 8-mm trocar were placed on the right side of the abdominal wall, under direct visualization, and two 8- mm and 12-mm trocars were placed on the left side of the abdominal wall, also under direct visualization  The patient was then placed in a reverse Trendelenburg position  A Kelle retractor was placed through a small stab incision below the xiphoid and was used to retract the left lobe of the liver in a medial fashion  An OG tube was placed to decompress the stomach and then removed immediately  Robot was then docked  I started the dissection on the right side by taking the pars flaccida down all the way up to the right shayy the right shayy was dissected away from the hernia sac and esophageal wall, the right vagus nerve was clearly identified and kept out of harm's way  I then turned my attention to the left shayy, in order to expose left shayy clearly I mobilized the gastric fundus by taking the short gastrics down using the vessel sealer of note the fundus was acutely inflamed and had herniated into the thoracic cavity  The fundus was reduced and the hernia sac dissected away from the left shayy and then the plane of dissection around the left shayy was connected anteriorly with my plane of dissection that was developed previously while dissecting the right shayy  The phrenoesophageal ligament was also taken down in its entirety and the hernia sac dissected anteriorly away from the esophageal wall left vagus nerve was identified and preserved  At that point the hernia sac was completely dissected and excised  The decision was then made to repair the hernia posteriorly  Right shayy and left shayy were dissected away from the hernia sac and skeletonized all the way down to the confluence  Esophagus was kept out of harm's way during the dissection   Both vagi were also identified and preserved as previously mentioned  Dissection was carried all the way up in the thoracic cavity to obtain more length of the esophagus  The dissection was completed circumferentially around the esophagus  We had at least 3 cm of the lower esophagus in an intra-abdominal location by the end of the dissection  Hernia sac was completely dissected and excised  Right shayy and left shayy were then approximated using 0 Ethibond sutures placed in an interrupted fashion  We contemplated using a mesh to reinforce the hernia repair but because of the how the mesh was situated around the esophagus we felt like it will be impinging on the esophagus so we decided not to use it  We then removed the [facility] liver retractor  All the trocars were removed under direct visualization, and the skin edges were approximated using 4-0 Monocryl in an interrupted, inverted fashion  Histoacryl was then applied to the skin incisions  The patient was extubated and transferred to the PACU in stable condition           SIGNATURE: Jenifer Spears MD  DATE: May 22, 2023  TIME: 3:16 PM

## 2023-05-23 ENCOUNTER — APPOINTMENT (OUTPATIENT)
Dept: RADIOLOGY | Facility: HOSPITAL | Age: 63
End: 2023-05-23

## 2023-05-23 VITALS
SYSTOLIC BLOOD PRESSURE: 128 MMHG | DIASTOLIC BLOOD PRESSURE: 67 MMHG | WEIGHT: 274.25 LBS | OXYGEN SATURATION: 92 % | HEART RATE: 72 BPM | BODY MASS INDEX: 39.26 KG/M2 | HEIGHT: 70 IN | RESPIRATION RATE: 17 BRPM | TEMPERATURE: 98.2 F

## 2023-05-23 LAB
ANION GAP SERPL CALCULATED.3IONS-SCNC: 6 MMOL/L (ref 4–13)
BUN SERPL-MCNC: 16 MG/DL (ref 5–25)
CALCIUM SERPL-MCNC: 9 MG/DL (ref 8.4–10.2)
CHLORIDE SERPL-SCNC: 107 MMOL/L (ref 96–108)
CO2 SERPL-SCNC: 27 MMOL/L (ref 21–32)
CREAT SERPL-MCNC: 0.79 MG/DL (ref 0.6–1.3)
ERYTHROCYTE [DISTWIDTH] IN BLOOD BY AUTOMATED COUNT: 14 % (ref 11.6–15.1)
GFR SERPL CREATININE-BSD FRML MDRD: 80 ML/MIN/1.73SQ M
GLUCOSE SERPL-MCNC: 121 MG/DL (ref 65–140)
HCT VFR BLD AUTO: 35.5 % (ref 34.8–46.1)
HGB BLD-MCNC: 11.5 G/DL (ref 11.5–15.4)
MCH RBC QN AUTO: 30.7 PG (ref 26.8–34.3)
MCHC RBC AUTO-ENTMCNC: 32.4 G/DL (ref 31.4–37.4)
MCV RBC AUTO: 95 FL (ref 82–98)
PLATELET # BLD AUTO: 183 THOUSANDS/UL (ref 149–390)
PMV BLD AUTO: 11.9 FL (ref 8.9–12.7)
POTASSIUM SERPL-SCNC: 4.2 MMOL/L (ref 3.5–5.3)
RBC # BLD AUTO: 3.75 MILLION/UL (ref 3.81–5.12)
SODIUM SERPL-SCNC: 140 MMOL/L (ref 135–147)
WBC # BLD AUTO: 10.72 THOUSAND/UL (ref 4.31–10.16)

## 2023-05-23 RX ORDER — METOCLOPRAMIDE 10 MG/1
10 TABLET ORAL 4 TIMES DAILY
Qty: 20 TABLET | Refills: 0 | Status: SHIPPED | OUTPATIENT
Start: 2023-05-23 | End: 2023-05-28

## 2023-05-23 RX ORDER — ACETAMINOPHEN 160 MG/5ML
975 SUSPENSION ORAL EVERY 8 HOURS SCHEDULED
Qty: 638.4 ML | Refills: 0 | Status: SHIPPED | OUTPATIENT
Start: 2023-05-23 | End: 2023-05-30

## 2023-05-23 RX ADMIN — OXYCODONE HYDROCHLORIDE 5 MG: 5 SOLUTION ORAL at 00:18

## 2023-05-23 RX ADMIN — FAMOTIDINE 20 MG: 10 INJECTION INTRAVENOUS at 10:02

## 2023-05-23 RX ADMIN — IOHEXOL 30 ML: 350 INJECTION, SOLUTION INTRAVENOUS at 09:29

## 2023-05-23 RX ADMIN — ONDANSETRON 4 MG: 2 INJECTION INTRAMUSCULAR; INTRAVENOUS at 00:17

## 2023-05-23 RX ADMIN — ONDANSETRON 4 MG: 2 INJECTION INTRAMUSCULAR; INTRAVENOUS at 12:51

## 2023-05-23 RX ADMIN — ONDANSETRON 4 MG: 2 INJECTION INTRAMUSCULAR; INTRAVENOUS at 05:57

## 2023-05-23 RX ADMIN — ACETAMINOPHEN 975 MG: 325 SUSPENSION ORAL at 05:57

## 2023-05-23 RX ADMIN — SODIUM CHLORIDE, SODIUM LACTATE, POTASSIUM CHLORIDE, AND CALCIUM CHLORIDE 100 ML/HR: .6; .31; .03; .02 INJECTION, SOLUTION INTRAVENOUS at 00:18

## 2023-05-23 RX ADMIN — ACETAMINOPHEN 975 MG: 325 SUSPENSION ORAL at 14:06

## 2023-05-23 NOTE — PLAN OF CARE
Problem: PAIN - ADULT  Goal: Verbalizes/displays adequate comfort level or baseline comfort level  Description: Interventions:  - Encourage patient to monitor pain and request assistance  - Assess pain using appropriate pain scale  - Administer analgesics based on type and severity of pain and evaluate response  - Implement non-pharmacological measures as appropriate and evaluate response  - Consider cultural and social influences on pain and pain management  - Notify physician/advanced practitioner if interventions unsuccessful or patient reports new pain  Outcome: Progressing     Problem: INFECTION - ADULT  Goal: Absence or prevention of progression during hospitalization  Description: INTERVENTIONS:  - Assess and monitor for signs and symptoms of infection  - Monitor lab/diagnostic results  - Monitor all insertion sites, i e  indwelling lines, tubes, and drains  - Monitor endotracheal if appropriate and nasal secretions for changes in amount and color  - [place] appropriate cooling/warming therapies per order  - Administer medications as ordered  - Instruct and encourage patient and family to use good hand hygiene technique  - Identify and instruct in appropriate isolation precautions for identified infection/condition  Outcome: Progressing  Goal: Absence of fever/infection during neutropenic period  Description: INTERVENTIONS:  - Monitor WBC    Outcome: Progressing     Problem: Knowledge Deficit  Goal: Patient/family/caregiver demonstrates understanding of disease process, treatment plan, medications, and discharge instructions  Description: Complete learning assessment and assess knowledge base    Interventions:  - Provide teaching at level of understanding  - Provide teaching via preferred learning methods  Outcome: Progressing

## 2023-05-23 NOTE — DISCHARGE SUMMARY
Discharge Summary - Adilia Haney 58 y o  female MRN: [id number]    Unit/Bed#: E5 -01 Encounter: [id number]      Pre-Operative Diagnosis: Pre-Op Diagnosis Codes:     * Gastroesophageal reflux disease [K21 9]     * Hiatal hernia [K44 9]     * Heartburn [R12]    Post-Operative Diagnosis: Post-Op Diagnosis Codes:     * Gastroesophageal reflux disease [K21 9]     * Hiatal hernia [K44 9]     * Heartburn [R12]    Procedures Performed:  Procedure(s):  PARAESOPHAGEAL HERNIA REPAIR W/ ROBOT AND TIF    Surgeon: Talya Gonzalez MD    See H & P for full details of admission and Operative Note for full details of operations performed  Patient tolerated surgery well without complications  In the morning postoperative Day 1, the patient had mild nausea and abdominal pain  UGI obtained and normal in findings  Tolerated a clear liquid diet without vomiting  Able to ambulate and voiding independently  Patient was deemed ready for discharge home  Patient was seen and examined prior to discharge  Provisions for Follow-Up Care:  See After Visit Summary/Discharge Instructions for information related to follow-up care and home orders  Disposition: Home, in stable condition  Planned Readmission: No    Discharge Medications:  See After Visit Summary/Discharge Instructions for reconciled discharge medications provided to patient and family  Post Operative instructions: Reviewed with patient and/or family      Signature:   Dixon Duran PA-C  Date: 5/23/2023 Time: 2:06 PM

## 2023-05-23 NOTE — DISCHARGE INSTR - AVS FIRST PAGE
Bariatric/Weight Loss Surgery  Hospital Discharge Instructions  ACTIVITY:  Progress as feels comfortable - a good rule is:  if you are doing something and it begins to hurt, stop doing the activity  Walk every hour while at home  You may walk stairs if you do so slowly  You may shower 48 hours after surgery  Do not scrub incision sites  Blot gently with clean towel to dry incisions  (see #4 below)   Use your incentive spirometer 10 times per hour while awake for 1 week after surgery  Do NOT drive for 48 hours after surgery  No driving 24 hours after taking certain prescription pain medications  Examples of such medication are Percocet, Darvocet, Oxycodone, Tylenol #3, and Tylenol with Codeine  DIET  Diet as instructed    MEDICATIONS:  The abdominal nerve block will wear off during the first 1-2 days that you are home, and you may become sore (especially over incision site/sites where abdominal wall is sutured)  This may create a pulling sensation, especially while moving around, and will fade over time  Continue to take your Tylenol and your pain medication as instructed  Start vitamins and minerals one week after surgery or when you start stage 3/puree diet  Anti-acid Medication as per prescription  DO NOT TAKE BIRTH CONTROL(BC) MEDICATIONS, INSERT BC VAGINAL RINGS, OR PLACE IUD OR ANY OTHER BC METHODS UNTIL 31 DAYS FROM DAY OF DISCHARGE FROM HOSPITAL  THIS PLACES YOU AT HIGH RISK FOR A POTENTIALLY LIFE THREATENING BLOOD CLOT  Remember to always use barrier methods for birth control and speak to your GYN about using two forms of birth control to start 31 days after surgery  It is very important to avoid pregnancy until at least 18-24 months after surgery  INCISION CARE  You may shower and get incisions wet 2 days after surgery  No soaking tub baths or swimming for 30 days after surgery  Keep abdominal area and incisions clean  Use soap and water to create a good lather and rinse off    Do not scrub incisions  If you have a drain, empty the drain as the nurses instructed  FOLLOW-UP APPOINTMENT should be made for one week after discharge  Call surgeon’s office at [phone] to schedule an appointment      CALL YOUR DOCTOR FOR:  pain not controlled by pain medications, a temperature greater than 101 5° F, any increase or change in drainage or redness from any incision, any vomiting or inability to keep liquids down, shortness of breath, shoulder pain, or bleeding

## 2023-05-23 NOTE — PLAN OF CARE
Problem: PAIN - ADULT  Goal: Verbalizes/displays adequate comfort level or baseline comfort level  Description: Interventions:  - Encourage patient to monitor pain and request assistance  - Assess pain using appropriate pain scale  - Administer analgesics based on type and severity of pain and evaluate response  - Implement non-pharmacological measures as appropriate and evaluate response  - Consider cultural and social influences on pain and pain management  - Notify physician/advanced practitioner if interventions unsuccessful or patient reports new pain  Outcome: Progressing     Problem: INFECTION - ADULT  Goal: Absence or prevention of progression during hospitalization  Description: INTERVENTIONS:  - Assess and monitor for signs and symptoms of infection  - Monitor lab/diagnostic results  - Monitor all insertion sites, i e  indwelling lines, tubes, and drains  - Monitor endotracheal if appropriate and nasal secretions for changes in amount and color  - [place] appropriate cooling/warming therapies per order  - Administer medications as ordered  - Instruct and encourage patient and family to use good hand hygiene technique  - Identify and instruct in appropriate isolation precautions for identified infection/condition  Outcome: Progressing     Problem: Knowledge Deficit  Goal: Patient/family/caregiver demonstrates understanding of disease process, treatment plan, medications, and discharge instructions  Description: Complete learning assessment and assess knowledge base    Interventions:  - Provide teaching at level of understanding  - Provide teaching via preferred learning methods  Outcome: Progressing

## 2023-05-23 NOTE — PROGRESS NOTES
"Progress Note - Bariatric Surgery   Nadia Moon 58 y o  female MRN: [id number]  Unit/Bed#: E5 -01 Encounter: [id number]      Subjective/Objective     Subjective:  Patient seen and valuated this morning at bedside  Patient is  POD1 s/p Robotic HHR with c-TIF  Patient denies fevers, chills, sweats, SOB, CP, calf pain  Pain adequately controlled on oral pain medication  Ambulating without assistance, voiding well, and using incentive spirometer  Patient tolerating liquid diet without nausea or vomiting today  Vital signs stable    Objective:    /67   Pulse 72   Temp 98 2 °F (36 8 °C)   Resp 17   Ht 5' 10\" (1 778 m)   Wt 124 kg (274 lb 4 oz)   SpO2 92%   BMI 39 35 kg/m²       Intake/Output Summary (Last 24 hours) at 5/23/2023 0737  Last data filed at 5/22/2023 2102  Gross per 24 hour   Intake 1300 ml   Output 350 ml   Net 950 ml       Invasive Devices     Peripheral Intravenous Line  Duration           Peripheral IV 05/22/23 Right Hand <1 day                ROS: 10-point system completed  All negative except see HPI      Physical Exam    General Appearance:    Alert, cooperative, no distress, appears stated age   Head:    Normocephalic, without obvious abnormality, atraumatic   Lungs:     Respirations unlabored   Heart:    Regular rate and rhythm   Abdomen:     Soft, appropriate tenderness, no masses, no organomegaly, non-distended   Extremities:   Extremities normal, atraumatic, no cyanosis or edema   Neurologic:  Incision:  Psych:   Normal strength and sensation    Clean, dry, and intact    Normal mood and affect       Lab, Imaging and other studies:  CBC:   Lab Results   Component Value Date    WBC 10 72 (H) 05/23/2023    HGB 11 5 05/23/2023    HCT 35 5 05/23/2023    MCV 95 05/23/2023     05/23/2023    MCH 30 7 05/23/2023    MCHC 32 4 05/23/2023    RDW 14 0 05/23/2023    MPV 11 9 05/23/2023   , CMP:   Lab Results   Component Value Date    SODIUM 140 05/23/2023    K 4 2 05/23/2023    "  05/23/2023    CO2 27 05/23/2023    BUN 16 05/23/2023    CREATININE 0 79 05/23/2023    CALCIUM 9 0 05/23/2023    EGFR 80 05/23/2023        VTE Mechanical Prophylaxis: sequential compression device    Assessment/Plan  1)  Patient with Morbid Obesity s/p Robotic HHR with c-TIF with stable post op course  Patient afebrile and hemodynamically stable  - Encourage PO fluids   - Recommend ambulation, use of SCDs when not ambulating, and incentive spirometry  - Multimodal pain control  - Complete antibiotic course  - Plan to D/C patient home today pending anticipated progression    Plan of care was discussed with patient    Care plan discussed with Dr Lavell Ragsdale DO  Bariatric Surgery Fellow  5/23/2023  7:37 AM

## 2023-05-23 NOTE — DISCHARGE INSTRUCTIONS
your medications from [address] in [facility] [address] or cut your pills and open capsules, mix with liquid to drink    Take Tylenol every 8 hours around the clock, unless instructed otherwise  Take your omeprazole daily  It is important to stay hydrated and follow your discharge diet progression   Mild nausea is ok as long as you can drink fluids, sip very slowly and get up and walk during any periods of nausea  You may shower normally after 48 hours, but do not scrub incision sites, blot gently with clean towel to dry incisions  Take home medications as usual unless instructed otherwise while in hospital  Follow up with Dr Zeny Pascal and your PCP within the next week

## 2023-05-24 ENCOUNTER — TELEPHONE (OUTPATIENT)
Dept: MEDSURG UNIT | Facility: HOSPITAL | Age: 63
End: 2023-05-24

## 2023-05-24 NOTE — TELEPHONE ENCOUNTER
Post op follow up phone call completed  Pt is sipping full liquids  Using IS as instructed, reinforced importance of using IS to help prevent pneumonia  Ambulating about home without difficulty  Pain controlled with analgesia  Reaffirmed examples of full liquid diet over the next week  Pt stated understanding about discharge instructions and medication adjustments  Follow up appt with surgeon scheduled for next week  Instructed to call with any additional questions or concerns

## 2023-06-02 ENCOUNTER — OFFICE VISIT (OUTPATIENT)
Dept: BARIATRICS | Facility: CLINIC | Age: 63
End: 2023-06-02

## 2023-06-02 VITALS
SYSTOLIC BLOOD PRESSURE: 120 MMHG | HEART RATE: 97 BPM | TEMPERATURE: 97.8 F | HEIGHT: 71 IN | DIASTOLIC BLOOD PRESSURE: 82 MMHG | WEIGHT: 263 LBS | BODY MASS INDEX: 36.82 KG/M2

## 2023-06-02 DIAGNOSIS — K44.9 PARAESOPHAGEAL HERNIA: Primary | ICD-10-CM

## 2023-06-02 DIAGNOSIS — K21.9 GASTROESOPHAGEAL REFLUX DISEASE, UNSPECIFIED WHETHER ESOPHAGITIS PRESENT: Primary | ICD-10-CM

## 2023-06-02 NOTE — PROGRESS NOTES
POST OP FOLLOW UP VISIT - BARIATRIC SURGERY  Lissett Hernandez 58 y o  female MRN: [id number]  Unit/Bed#:  Encounter: [id number]      HPI:  Lissett Hernandez is a 58 y o  female status post robotic assisted PEHR w/ TIF performed by Dr Antoine Navarrete on 5/22/23 returning to office for first post op visit since surgery  Tolerating diet  Denies nausea and vomiting  Taking multivitamins and PPI daily        Review of Systems  Denies chest pain, SOB, N/V/D, headache, blurred vision    Historical Information   Past Medical History:   Diagnosis Date   • Back pain    • Bradycardia    • Colon polyp    • DDD (degenerative disc disease), lumbar    • DJD (degenerative joint disease)    • Female infertility    • GERD (gastroesophageal reflux disease)    • History of COVID-19 12/2021    mild cold s/s   • History of transfusion    • Hypercholesterolemia    • Obesity    • Paraesophageal hernia    • Uveitis    • Vertigo      Past Surgical History:   Procedure Laterality Date   • CATARACT EXTRACTION, BILATERAL     • COLONOSCOPY      2021   • DILATION AND CURETTAGE OF UTERUS     • EGD     • HYSTEROSCOPY  01/09/2012   • MYOMECTOMY     • WA LAPS RPR PARAESPHGL HRNA INCL FUNDPLSTY W/MESH N/A 5/22/2023    Procedure: PARAESOPHAGEAL HERNIA REPAIR W/ ROBOT AND TIF;  Surgeon: Shannen Rajput MD;  Location: [facility];  Service: Bariatrics   • TONSILLECTOMY AND ADENOIDECTOMY       Social History   Social History     Substance and Sexual Activity   Alcohol Use Yes    Comment: 1 x per week     Social History     Substance and Sexual Activity   Drug Use Never     Social History     Tobacco Use   Smoking Status Never   Smokeless Tobacco Never     Family History:   Family History   Problem Relation Age of Onset   • Hypertension Mother    • Heart murmur Father    • Diabetes Sister    • No Known Problems Sister    • No Known Problems Maternal Grandmother    • No Known Problems Maternal Grandfather    • No Known Problems Paternal Grandmother    • No "Known Problems Paternal Grandfather    • No Known Problems Maternal Aunt    • No Known Problems Maternal Aunt    • Breast cancer Paternal Aunt 46   • Esophageal cancer Cousin 61   • Hyperlipidemia Family        Meds/Allergies   all medications and allergies reviewed  Allergies   Allergen Reactions   • Neomycin-Bacitracin Zn-Polymyx Rash       Objective       Current Vitals:   Blood Pressure: 120/82 (06/02/23 1303)  Pulse: 97 (06/02/23 1303)  Temperature: 97 8 °F (36 6 °C) (06/02/23 1303)  Temp Source: Tympanic (06/02/23 1303)  Height: 5' 10 5\" (179 1 cm) (06/02/23 1303)  Weight - Scale: 119 kg (263 lb) (06/02/23 1303)      Invasive Devices     None                 Physical Exam  Awake, alert, oriented, no acute distress  Normal work of breathing  Regular rate  Abd soft, appropriately tender, incisions clean, dry and intact  Moves all extremities  Normal strength and ROM  No edema  Appropriate affect      Assessment/PLAN:    58 y o  female status post robotic assisted laparoscopic PEHR/TIF done on 5/22/23 by Dr Rajat Azevedo, doing well post op  No major issues and healing well  Increase physical activity slowly as tolerated and instructed  Advance diet as instructed by our dietitians today and as indicated in the binder  Start weaning PPI  Follow up in 3 months as scheduled            Rebekah Dave MD  Bariatric Surgery Fellow   6/2/2023  1:14 PM    "

## 2023-06-02 NOTE — PROGRESS NOTES
Reviewed post-operative PEHR diet progression, full liquid diet for 7-10 days, then soft diet for 4-8 weeks  Discussed soft diet choices, eating soft, moist foods, avoiding dry tough foods, using sauces or gravies to moisten foods, sipping liquids with meals to facilitate swallowing, cutting food into small bites or eating minced or ground meats, chewing well, eating slowly, remaining upright for 45-60 minutes after eating, do not eat for 3 hour prior to laying down for bed, eat six small meals per day  Discussed avoiding acidic foods, very hot and very cold foods and liquids, and discussed ways to minimize gas and bloating and gas-producing foods to eliminate

## 2023-06-04 DIAGNOSIS — K21.00 GASTROESOPHAGEAL REFLUX DISEASE WITH ESOPHAGITIS WITHOUT HEMORRHAGE: ICD-10-CM

## 2023-06-05 RX ORDER — OMEPRAZOLE 20 MG/1
CAPSULE, DELAYED RELEASE ORAL
Qty: 90 CAPSULE | Refills: 2 | Status: SHIPPED | OUTPATIENT
Start: 2023-06-05

## 2023-06-12 DIAGNOSIS — E78.2 MIXED HYPERLIPIDEMIA: ICD-10-CM

## 2023-06-12 RX ORDER — ATORVASTATIN CALCIUM 20 MG/1
TABLET, FILM COATED ORAL
Qty: 90 TABLET | Refills: 3 | Status: SHIPPED | OUTPATIENT
Start: 2023-06-12

## 2023-08-04 ENCOUNTER — TELEMEDICINE (OUTPATIENT)
Dept: FAMILY MEDICINE CLINIC | Facility: CLINIC | Age: 63
End: 2023-08-04
Payer: COMMERCIAL

## 2023-08-04 VITALS — HEIGHT: 71 IN | WEIGHT: 263 LBS | TEMPERATURE: 97.5 F | BODY MASS INDEX: 36.82 KG/M2

## 2023-08-04 DIAGNOSIS — U07.1 COVID-19: Primary | ICD-10-CM

## 2023-08-04 PROCEDURE — 99213 OFFICE O/P EST LOW 20 MIN: CPT | Performed by: NURSE PRACTITIONER

## 2023-08-04 RX ORDER — ACETAMINOPHEN 160 MG/5ML
SUSPENSION ORAL
COMMUNITY
Start: 2023-05-23

## 2023-08-04 RX ORDER — NIRMATRELVIR AND RITONAVIR 300-100 MG
3 KIT ORAL 2 TIMES DAILY
Qty: 30 TABLET | Refills: 0 | Status: SHIPPED | OUTPATIENT
Start: 2023-08-04 | End: 2023-08-09

## 2023-08-04 NOTE — PROGRESS NOTES
COVID-19 Outpatient Progress Note    Assessment/Plan:    Problem List Items Addressed This Visit    None  Visit Diagnoses     COVID-19    -  Primary    Relevant Medications    nirmatrelvir & ritonavir (Paxlovid, 300/100,) tablet therapy pack         Disposition:     Patient has asymptomatic or mild COVID-19 infection. Based off CDC guidelines, they were recommended to isolate for 5 days. If they are asymptomatic or symptoms are improving with no fevers in the past 24 hours, isolation may be ended followed by 5 days of wearing a mask when around othes to minimize risk of infecting others. If still have a fever or other symptoms have not improved, continue to isolate until they improve. Regardless of when they end isolation, avoid being around people who are more likely to get very sick from COVID-19 until at least day 11. Discussed symptom directed medication options with patient. High risk factor for Paxlovid is weight, BMI 37.20. Dicussed EUA medication Paxlovid, she would like to try this medication. Hold Atorvastatin while taking Paxlovid and for 5 days after finishing Paxlovid. She will call for any symptoms persisting more than 7-10 days, or if symptoms should worsen. Patient meets criteria for PAXLOVID and they have been counseled appropriately according to EUA documentation released by the FDA. After discussion, patient agrees to treatment. Emelina [place] is an investigational medicine used to treat mild-to-moderate COVID-19 in adults and children (15years of age and older weighing at least 80 pounds (40 kg)) with positive results of direct SARS-CoV-2 viral testing, and who are at high risk for progression to severe COVID-19, including hospitalization or death. PAXLOVID is investigational because it is still being studied. There is limited information about the safety and effectiveness of using PAXLOVID to treat people with mild-to-moderate COVID-19.     The FDA has authorized the emergency use of PAXLOVID for the treatment of mild-tomoderate COVID-19 in adults and children (15years of age and older weighing at least 80 pounds (40 kg)) with a positive test for the virus that causes COVID-19, and who are at high risk for progression to severe COVID-19, including hospitalization or death, under an EUA. What should I tell my healthcare provider before I take PAXLOVID? Tell your healthcare provider if you:  - Have any allergies  - Have liver or kidney disease  - Are pregnant or plan to become pregnant  - Are breastfeeding a child  - Have any serious illnesses    Tell your healthcare provider about all the medicines you take, including prescription and over-the-counter medicines, vitamins, and herbal supplements. Some medicines may interact with PAXLOVID and may cause serious side effects. Keep a list of your medicines to show your healthcare provider and pharmacist when you get a new medicine. You can ask your healthcare provider or pharmacist for a list of medicines that interact with PAXLOVID. Do not start taking a new medicine without telling your healthcare provider. Your healthcare provider can tell you if it is safe to take PAXLOVID with other medicines. Tell your healthcare provider if you are taking combined hormonal contraceptive. PAXLOVID may affect how your birth control pills work. Females who are able to become pregnant should use another effective alternative form of contraception or an additional barrier method of contraception. Talk to your healthcare provider if you have any questions about contraceptive methods that might be right for you. How do I take PAXLOVID? PAXLOVID consists of 2 medicines: nirmatrelvir and ritonavir. - Take 2 pink tablets of nirmatrelvir with 1 white tablet of ritonavir by mouth 2 times each day (in the morning and in the evening) for 5 days. For each dose, take all 3 tablets at the same time.   - If you have kidney disease, talk to your healthcare provider. You may need a different dose. - Swallow the tablets whole. Do not chew, break, or crush the tablets  - Take PAXLOVID with or without food. - Do not stop taking PAXLOVID without talking to your healthcare provider, even if you feel better. - If you miss a dose of PAXLOVID within 8 hours of the time it is usually taken, take it as soon as you remember. If you miss a dose by more than 8 hours, skip the missed dose and take the next dose at your regular time. Do not take 2 doses of PAXLOVID at the same time. - If you take too much PAXLOVID, call your healthcare provider or go to the nearest hospital emergency room right away. - If you are taking a ritonavir- or cobicistat-containing medicine to treat hepatitis C or Human Immunodeficiency Virus (HIV), you should continue to take your medicine as prescribed by your healthcare provider.  - Talk to your healthcare provider if you do not feel better or if you feel worse after 5 days. Who should generally not take PAXLOVID? Do not take PAXLOVID if:  You are allergic to nirmatrelvir, ritonavir, or any of the ingredients in PAXLOVID. You are taking any of the following medicines:  - Alfuzosin  - Pethidine, piroxicam, propoxyphene  - Ranolazine  - Amiodarone, dronedarone, flecainide, propafenone, quinidine  - Colchicine  - Lurasidone, pimozide, clozapine  - Dihydroergotamine, ergotamine, methylergonovine  - Lovastatin, simvastatin  - Sildenafil (Revatio®) for pulmonary arterial hypertension (PAH)  - Triazolam, oral midazolam  - Apalutamide  - Carbamazepine, phenobarbital, phenytoin  - Rifampin  - Nai’s Wort (hypericum perforatum)    What are the important possible side effects of PAXLOVID? Possible side effects of PAXLOVID are:  - Liver Problems.  Tell your healthcare provider right away if you have any of these signs and symptoms of liver problems: loss of appetite, yellowing of your skin and the whites of eyes (jaundice), dark-colored urine, pale colored stools and itchy skin, stomach area (abdominal) pain. - Resistance to HIV Medicines. If you have untreated HIV infection, PAXLOVID may lead to some HIV medicines not working as well in the future. - Other possible side effects include: altered sense of taste, diarrhea, high blood pressure, or muscle aches    These are not all the possible side effects of PAXLOVID. Not many people have taken PAXLOVID. Serious and unexpected side effects may happen. Anne Marie Chavez is still being studied, so it is possible that all of the risks are not known at this time. What other treatment choices are there? Like Ute Ped may allow for the emergency use of other medicines to treat people with COVID-19. Go to https://Brandtone/ for information on the emergency use of other medicines that are authorized by FDA to treat people with COVID-19. Your healthcare provider may talk with you about clinical trials for which you may be eligible. It is your choice to be treated or not to be treated with PAXLOVID. Should you decide not to receive it or for your child not to receive it, it will not change your standard medical care. What if I am pregnant or breastfeeding? There is no experience treating pregnant women or breastfeeding mothers with PAXLOVID. For a mother and unborn baby, the benefit of taking PAXLOVID may be greater than the risk from the treatment. If you are pregnant, discuss your options and specific situation with your healthcare provider. It is recommended that you use effective barrier contraception or do not have sexual activity while taking PAXLOVID. If you are breastfeeding, discuss your options and specific situation with your healthcare provider. How do I report side effects with PAXLOVID?     Contact your healthcare provider if you have any side effects that bother you or do not go away. Report side effects to FDA MedWatch at www.fda.gov/medwatch or call 0-977-XMT8101 or you can report side effects to [facility]. at the contact information provided below. Website Fax number Telephone number   wwwBurt [phone]     How should I store Bhavesh Peña? Store PAXLOVID tablets at room temperature between 68°F to 77°F (20°C to 25°C). Full fact sheet for patients, parents, and caregivers can be found at: [employer] [email or web address].za    I have spent a total time of 7 minutes on the day of the encounter for this patient including       Encounter provider: HELEN Vega     Provider located at: [address] [place] [address]     Recent Visits  No visits were found meeting these conditions. Showing recent visits within past 7 days and meeting all other requirements  Today's Visits  Date Type Provider Dept   08/04/23 Telemedicine Chikis Kimbrough Speaker, [address] [place] [address] today's visits and meeting all other requirements  Future Appointments  No visits were found meeting these conditions. Showing future appointments within next 150 days and meeting all other requirements     This virtual check-in was done via [address] [place] [address] and patient was informed that this is a secure, HIPAA-compliant platform. She agrees to proceed. Patient agrees to participate in a virtual check in via telephone or video visit instead of presenting to the office to address urgent/immediate medical needs. Patient is aware this is a billable service. She acknowledged consent and understanding of privacy and security of the video platform. The patient has agreed to participate and understands they can discontinue the visit at any time. After connecting through [facility], the patient was identified by name and date of birth.  Monika Mark was informed that this was a telemedicine visit and that the exam was being conducted confidentially over secure lines. My office door was closed. No one else was in the room. Italo Vazquez acknowledged consent and understanding of privacy and security of the telemedicine visit. I informed the patient that I have reviewed her record in Epic and presented the opportunity for her to ask any questions regarding the visit today. The patient agreed to participate. Verification of patient location:  Patient is located in the following state in which I hold an active license: [place]    Subjective:   Italo Vazquez is a 58 y.o. female who has been screened for COVID-19. Symptom change since last report: unchanged. Patient's symptoms include fatigue, malaise, nasal congestion, rhinorrhea, sore throat, cough, myalgias and headache. Patient denies fever, chills, anosmia, loss of taste, shortness of breath, chest tightness, abdominal pain, nausea, vomiting and diarrhea. - Date of symptom onset: 8/3/2023  - Date of positive COVID-19 test: 8/3/2023. Type of test: Home antigen. Patient with typical symptoms of COVID-19 and they attest that they were positive on home rapid antigen testing. Image of positive result is not able to be uploaded into their chart. COVID-19 vaccination status: Fully vaccinated with booster    Carolina Negrete has been staying home and has isolated themselves in her home. She is taking care to not share personal items and is cleaning all surfaces that are touched often, like counters, tabletops, and doorknobs using household cleaning sprays or wipes. She is wearing a mask when she leaves her room. Last COVID-19 Booster October 2022. Using OTC Tylenol and Benadryl as needed.  is positive for COVID-19 as well. No results found for: "SARSCOV2", "[facility]", "[address] [facility]", "CORONAVIRUSR", "[address]", "[address]"    Review of Systems   Constitutional: Positive for fatigue. Negative for chills and fever.    HENT: Positive for congestion, rhinorrhea and sore throat. Respiratory: Positive for cough. Negative for chest tightness and shortness of breath. Gastrointestinal: Negative for abdominal pain, diarrhea, nausea and vomiting. Musculoskeletal: Positive for myalgias. Neurological: Positive for headaches. Current Outpatient Medications on File Prior to Visit   Medication Sig   • acetaminophen (TYLENOL) 160 mg/5 mL liquid    • ammonium lactate (LAC-HYDRIN) 12 % cream APPLY DAILY AFTER THE SHOWER   • atorvastatin (LIPITOR) 20 mg tablet TAKE 1 TABLET BY MOUTH EVERY DAY   • glucosamine-chondroitin 500-400 MG tablet Take 1 tablet by mouth daily   • Multiple Vitamins-Minerals (MULTIVITAMIN WITH MINERALS) tablet Take 1 tablet by mouth daily   • omeprazole (PriLOSEC) 20 mg delayed release capsule TAKE 1 CAPSULE BY MOUTH EVERY DAY   • [DISCONTINUED] oxyCODONE (Roxicodone) 5 immediate release tablet Take 1 tablet (5 mg total) by mouth every 4 (four) hours as needed for moderate pain Max Daily Amount: 30 mg Do not start before May 23, 2023. (Patient not taking: Reported on 6/2/2023)       Objective:    Temp 97.5 °F (36.4 °C)   Ht 5' 10.5" (1.791 m)   Wt 119 kg (263 lb)   BMI 37.20 kg/m²        Physical Exam  Vitals and nursing note reviewed. Constitutional:       General: She is not in acute distress. Appearance: Normal appearance. She is not ill-appearing. HENT:      Head: Atraumatic. Pulmonary:      Effort: Pulmonary effort is normal. No respiratory distress. Comments: Dry cough  Neurological:      Mental Status: She is alert.    Psychiatric:         Mood and Affect: Mood normal.       Chikis Pearl

## 2023-09-12 ENCOUNTER — OFFICE VISIT (OUTPATIENT)
Dept: BARIATRICS | Facility: CLINIC | Age: 63
End: 2023-09-12
Payer: COMMERCIAL

## 2023-09-12 VITALS
SYSTOLIC BLOOD PRESSURE: 120 MMHG | WEIGHT: 255.5 LBS | TEMPERATURE: 97.5 F | HEART RATE: 73 BPM | DIASTOLIC BLOOD PRESSURE: 78 MMHG | BODY MASS INDEX: 35.77 KG/M2 | HEIGHT: 71 IN

## 2023-09-12 DIAGNOSIS — K21.9 GASTROESOPHAGEAL REFLUX DISEASE WITHOUT ESOPHAGITIS: ICD-10-CM

## 2023-09-12 DIAGNOSIS — Z48.815 ENCOUNTER FOR SURGICAL AFTERCARE FOLLOWING SURGERY OF DIGESTIVE SYSTEM: Primary | ICD-10-CM

## 2023-09-12 DIAGNOSIS — Z87.19 HISTORY OF REPAIR OF HIATAL HERNIA: ICD-10-CM

## 2023-09-12 DIAGNOSIS — Z98.890 HISTORY OF REPAIR OF HIATAL HERNIA: ICD-10-CM

## 2023-09-12 PROCEDURE — 99213 OFFICE O/P EST LOW 20 MIN: CPT | Performed by: NURSE PRACTITIONER

## 2023-09-12 NOTE — PROGRESS NOTES
Assessment/Plan:    No problem-specific Assessment & Plan notes found for this encounter. Diagnoses and all orders for this visit:    Encounter for surgical aftercare following surgery of digestive system    Gastroesophageal reflux disease without esophagitis    History of repair of hiatal hernia      - GERD precautions discussed with patient.   - Recommended to switch to pepcid or tums as needed for PRN heartburn. - follow up in 6 months for 3rd post op visit. Subjective:      Patient ID: Thea Quinteros is a 58 y.o. female. HPI  Patient is s/p PEHR and TIF with Dr. Jordin Marx and Dr. Jason Burnett on 05/22/2023 here for 3-month PO visit. Has been doing well, tolerating a regular diet. Has been able to taper off omeprazole successfully. She is using it as needed depending on the food she eats she may get triggering symptoms. Denies having any N/V/D/C, regurgitation, reflux or dysphagia. The following portions of the patient's history were reviewed and updated as appropriate: allergies, current medications, past family history, past medical history, past social history, past surgical history and problem list.    Review of Systems   Constitutional: Negative. Respiratory: Negative. Cardiovascular: Negative. Gastrointestinal:        Heartburn occasionally     Musculoskeletal: Negative. Neurological: Negative. Psychiatric/Behavioral: Negative. Objective:      /78   Pulse 73   Temp 97.5 °F (36.4 °C) (Tympanic)   Ht 5' 10.5" (1.791 m)   Wt 116 kg (255 lb 8 oz)   BMI 36.14 kg/m²           Physical Exam  Vitals and nursing note reviewed. Constitutional:       Appearance: Normal appearance. She is obese. Cardiovascular:      Rate and Rhythm: Normal rate and regular rhythm. Pulses: Normal pulses. Heart sounds: Normal heart sounds. Pulmonary:      Effort: Pulmonary effort is normal.      Breath sounds: Normal breath sounds.    Abdominal:      General: Bowel sounds are normal.      Palpations: Abdomen is soft. Tenderness: There is no abdominal tenderness. Musculoskeletal:         General: Normal range of motion. Skin:     General: Skin is warm and dry. Neurological:      General: No focal deficit present. Mental Status: She is alert and oriented to person, place, and time. Psychiatric:         Mood and Affect: Mood normal.         Behavior: Behavior normal.         Thought Content:  Thought content normal.         Judgment: Judgment normal.

## 2024-01-05 ENCOUNTER — HOSPITAL ENCOUNTER (OUTPATIENT)
Dept: RADIOLOGY | Age: 64
Discharge: HOME | End: 2024-01-05
Payer: COMMERCIAL

## 2024-01-05 VITALS — HEIGHT: 71 IN | WEIGHT: 255 LBS | BODY MASS INDEX: 35.7 KG/M2

## 2024-01-05 DIAGNOSIS — Z12.31 VISIT FOR SCREENING MAMMOGRAM: ICD-10-CM

## 2024-01-05 PROCEDURE — 77067 SCR MAMMO BI INCL CAD: CPT

## 2024-01-05 PROCEDURE — 77063 BREAST TOMOSYNTHESIS BI: CPT

## 2024-01-08 DIAGNOSIS — Z12.31 BREAST CANCER SCREENING BY MAMMOGRAM: Primary | ICD-10-CM

## 2024-02-28 ENCOUNTER — OFFICE VISIT (OUTPATIENT)
Dept: FAMILY MEDICINE CLINIC | Facility: CLINIC | Age: 64
End: 2024-02-28
Payer: COMMERCIAL

## 2024-02-28 VITALS
SYSTOLIC BLOOD PRESSURE: 134 MMHG | HEIGHT: 71 IN | DIASTOLIC BLOOD PRESSURE: 86 MMHG | BODY MASS INDEX: 36.82 KG/M2 | WEIGHT: 263 LBS | HEART RATE: 82 BPM | RESPIRATION RATE: 18 BRPM | TEMPERATURE: 98.5 F | OXYGEN SATURATION: 95 %

## 2024-02-28 DIAGNOSIS — M79.661 RIGHT CALF PAIN: Primary | ICD-10-CM

## 2024-02-28 PROCEDURE — 99214 OFFICE O/P EST MOD 30 MIN: CPT | Performed by: NURSE PRACTITIONER

## 2024-02-28 RX ORDER — PNV NO.95/FERROUS FUM/FOLIC AC 28MG-0.8MG
TABLET ORAL DAILY
COMMUNITY

## 2024-02-28 NOTE — PROGRESS NOTES
[facility]    NAME: Marina Matos  AGE: 63 y.o. SEX: female  : 1960     DATE: 2024     Assessment and Plan:     Problem List Items Addressed This Visit        Other    Right calf pain - Primary     Patient presents to the office today with a 2 to 3-week history of right calf pain.  This is not constant in nature.  This pain is in her upper calf.  She states sometimes it radiates down her leg and sometimes it radiates up to her thigh.  No history of blood clots.  No recent travel.  On exam there is no erythema or heat to that area noted.  There are no rashes or lesions.  Negative for Baker's cyst.  Patient states the pain is worse when going up and down steps or flexing her foot.  Unsure today of the etiology of her pain.  There are no true risk factors for a blood clot and nothing on exam which is concerning for a blood clot.  Physical therapy recommended.  I will refer her to the orthopedic group for a more thorough exam.  If the patient develops any other worrisome symptoms or if the pain worsens she should be evaluated in the emergency room.         Relevant Orders    Ambulatory Referral to Orthopedic Surgery    Ambulatory Referral to Physical Therapy           No follow-ups on file.     Chief Complaint:     Chief Complaint   Patient presents with   • Leg Pain     Left calf persistent pain           History of Present Illness:   Patient presents to the office today with concern of right upper outer calf pain for approximately 2 to 3 weeks.  No mechanism of injury noted.  No travel.  No history of blood clots.  No trauma.  The pain sometimes radiates down towards her foot and sometimes up her leg to her thigh.  Today in the office her pain is about a 2.  She states when she is ambulating or going up and down steps the pain is worsening.  Flexing her foot seems to worsen the pain.  She has been occasionally taking ibuprofen for the discomfort.  She  "is on Lipitor but has been on this medication for many years.  Patient has no risk factors for blood clot.  Physical therapy recommended.  Refer to orthopedics.       Review of Systems:     Review of Systems   Constitutional:  Negative for activity change, fatigue and fever.   HENT:  Negative for congestion, hearing loss, rhinorrhea, trouble swallowing and voice change.    Eyes:  Negative for photophobia, pain, discharge and visual disturbance.   Respiratory:  Negative for cough, chest tightness and shortness of breath.    Cardiovascular:  Negative for chest pain, palpitations and leg swelling.   Gastrointestinal:  Negative for abdominal pain, blood in stool, constipation, nausea and vomiting.   Endocrine: Negative for cold intolerance and heat intolerance.   Genitourinary:  Negative for difficulty urinating, frequency, hematuria, urgency, vaginal bleeding and vaginal discharge.   Musculoskeletal:  Negative for arthralgias and myalgias (right upper calf).   Skin: Negative.    Neurological:  Negative for dizziness, weakness, numbness and headaches.   Psychiatric/Behavioral:  Negative for decreased concentration. The patient is not nervous/anxious.         Problem List:     Patient Active Problem List   Diagnosis   • Mixed hyperlipidemia   • Bradycardia   • Dense breast tissue   • Dysfunctional uterine bleeding   • Endometrial hyperplasia   • GERD (gastroesophageal reflux disease)   • Post-menopausal bleeding   • Obesity   • IFG (impaired fasting glucose)   • Preop cardiovascular exam   • Paraesophageal hernia   • Right calf pain        Objective:     /86   Pulse 82   Temp 98.5 °F (36.9 °C) (Temporal)   Resp 18   Ht 5' 10.5\" (1.791 m)   Wt 119 kg (263 lb)   SpO2 95%   BMI 37.20 kg/m²     Current Outpatient Medications   Medication Sig Dispense Refill   • ammonium lactate (LAC-HYDRIN) 12 % cream APPLY DAILY AFTER THE SHOWER     • atorvastatin (LIPITOR) 20 mg tablet TAKE 1 TABLET BY MOUTH EVERY DAY 90 " tablet 3   • glucosamine-chondroitin 500-400 MG tablet Take 1 tablet by mouth daily     • Multiple Vitamins-Minerals (MULTIVITAMIN WITH MINERALS) tablet Take 1 tablet by mouth daily     • Vitamin E 134 MG (200 UNIT) capsule Take by mouth daily       No current facility-administered medications for this visit.       Physical Exam  Vitals reviewed.   Constitutional:       Appearance: Normal appearance. She is obese.   HENT:      Head: Normocephalic.      Nose: Nose normal.      Mouth/Throat:      Mouth: Mucous membranes are moist.      Pharynx: Oropharynx is clear.   Eyes:      Extraocular Movements: Extraocular movements intact.      Pupils: Pupils are equal, round, and reactive to light.   Cardiovascular:      Rate and Rhythm: Normal rate and regular rhythm.      Pulses: Normal pulses.      Heart sounds: Normal heart sounds.   Pulmonary:      Effort: Pulmonary effort is normal.      Breath sounds: Normal breath sounds.   Musculoskeletal:         General: Normal range of motion.      Right lower leg: Tenderness present.      Left lower leg: Normal.        Legs:    Skin:     General: Skin is warm and dry.   Neurological:      General: No focal deficit present.      Mental Status: She is alert and oriented to person, place, and time.   Psychiatric:         Mood and Affect: Mood normal.         Behavior: Behavior normal.         Thought Content: Thought content normal.         Judgment: Judgment normal.         HELEN Harris  [facility]

## 2024-02-28 NOTE — ASSESSMENT & PLAN NOTE
Patient presents to the office today with a 2 to 3-week history of right calf pain.  This is not constant in nature.  This pain is in her upper calf.  She states sometimes it radiates down her leg and sometimes it radiates up to her thigh.  No history of blood clots.  No recent travel.  On exam there is no erythema or heat to that area noted.  There are no rashes or lesions.  Negative for Baker's cyst.  Patient states the pain is worse when going up and down steps or flexing her foot.  Unsure today of the etiology of her pain.  There are no true risk factors for a blood clot and nothing on exam which is concerning for a blood clot.  Physical therapy recommended.  I will refer her to the orthopedic group for a more thorough exam.  If the patient develops any other worrisome symptoms or if the pain worsens she should be evaluated in the emergency room.

## 2024-03-13 ENCOUNTER — EVALUATION (OUTPATIENT)
Dept: PHYSICAL THERAPY | Facility: REHABILITATION | Age: 64
End: 2024-03-13
Payer: COMMERCIAL

## 2024-03-13 DIAGNOSIS — M79.661 RIGHT CALF PAIN: Primary | ICD-10-CM

## 2024-03-13 PROCEDURE — 97162 PT EVAL MOD COMPLEX 30 MIN: CPT

## 2024-03-13 PROCEDURE — 97110 THERAPEUTIC EXERCISES: CPT

## 2024-03-13 NOTE — PROGRESS NOTES
PT Evaluation     Today's date: 3/13/2024  Patient name: Marina Matos  : 1960  MRN: [id number]  Referring provider: Palmira Pérez CRNP  Dx:   Encounter Diagnosis     ICD-10-CM    1. Right calf pain  M79.661 Ambulatory Referral to Physical Therapy          Start Time: 1615  Stop Time: 1700  Total time in clinic (min): 45 minutes    Assessment  Assessment details:   Marina Matos is a pleasant 63 y.o. female who presents with acute right calf/thigh pain. Given testing today, possible sciatic nerve entrapment contributing to her symptoms. Patient has evaluation with ortho tomorrow 3/14/24. The impairments listed below are resulting in reduced QoL and fear of making her symptoms worse. I expect she will improve in 4-6 weeks.          Impairments: abnormal gait, abnormal or restricted ROM, activity intolerance, impaired physical strength, lacks appropriate home exercise program, pain with function and weight-bearing intolerance    Symptom irritability: moderateUnderstanding of Dx/Px/POC: good   Prognosis: good    Goals  Short Term Goals (Week 4):  1. Decreased pain by 50%  2. Demonstrate asymptomatic SLR (tibial bias)  3. GROC >50%      Long Term Goals (8 weeks):  1. Patient will exceed FOTO predicted outcome score  2. Patient will be fully independent with HEP by discharge  3. Patient will be able to manage symptoms independently.       Plan  Plan details: Plan to see patient 1 time every 2 weeks given high copay.  Patient would benefit from: skilled physical therapy  Planned modality interventions: low level laser therapy  Planned therapy interventions: home exercise program, therapeutic exercise, therapeutic activities, stretching, strengthening, patient education, neuromuscular re-education, nerve gliding, massage, manual therapy, joint mobilization, IASTM, activity modification, behavior modification, graded exercise, graded activity, functional ROM exercises and flexibility  Treatment plan  discussed with: patient        Subjective Evaluation    History of Present Illness  Mechanism of injury: Patient presents to PT with a referral from her PCP for right calf pain. Patient reports symptoms started around 2-3 weeks ago and are not associated with an injury/trauma. Symptoms are localized right lateral calf which do radiate into her foot and thigh at times. Patient reports symptoms are intermittent/random. Patient does reports symptoms at rest and with sleeping. Patient states symptoms are not associated. Patient denies any swelling and/or tenderness. Patient has a history of sciatica affecting the right leg. Patient denies any previous injuries/surgeries/traumas affecting the right leg.   Patient Goals  Patient goals for therapy: decreased pain, increased strength, independence with ADLs/IADLs and return to sport/leisure activities    Pain  Current pain ratin  At worst pain ratin  Quality: sharp          Objective  Postural Observation   Sitting: WNL  Standing: WNL  Postural Change: no effect  Lateral Shift: none  Shift Relevant: n/a    Myotomes  Right Hip Flexion (L1-3): (5/5)  Left Hip Flexion (L1-3): (5/5)  R Knee Ext (L3-4): (5/5)  L Knee Ext (L3-4): (5/5)  R DF (L4): (5/5)  L DF (L4): (5/5)  R EDL (L5): (5/5)  L EDL (L5): (5/5)  R PF (S1): (5/5)  L PF (S1): (5/5)  R Knee Flex (S1-2): (5/5)  L Knee Flex (S1-2): (5/5)    Dermatomes  Sensation intact bilaterally    Reflexes  R Patellar Reflex: (2+)  L Patellar Reflex: (2+)  R Achilles Reflex: (2+)  L Achilles Reflex: (2+)    Neurodynamic Testing  Slump Test: positive  SLR: positive   Femoral Nerve Traction Test: negative    Lumbar Active Range of Motion  Movement Loss Symptoms Nagi Mod Min Nil Symptoms   Flexion   x     Extension   x     R SG    x    L SG    x    Other          Mally Assessment  Rep EIS: produced, nb/nw  Rep EIL: produce LBP, better      Hip Special Tests:  FADIRS= (-), FABERS= (-), Scours= (-), Distraction= (-)      SIJ  Special Tests:  Compression= (-), Gapping= (-), FABERS= (-), Gaenslan's= (-), Sacral Thrust/PA Pressure= (-), Post Thigh Thrust= (-)    Palpation  TTP  along sciatic nerve from posterolateral knee into gluteal region       Precautions:       Manuals 3/13                                                                Neuro Re-Ed                                                                                                        Ther Ex             Supine 90/90 Sciatic Nerve Gliders HEP            REIL HEP                                                                                          Ther Activity                                       Gait Training                                       Modalities

## 2024-03-14 ENCOUNTER — OFFICE VISIT (OUTPATIENT)
Dept: OBGYN CLINIC | Facility: CLINIC | Age: 64
End: 2024-03-14
Payer: COMMERCIAL

## 2024-03-14 ENCOUNTER — APPOINTMENT (OUTPATIENT)
Dept: RADIOLOGY | Facility: AMBULARY SURGERY CENTER | Age: 64
End: 2024-03-14
Attending: ORTHOPAEDIC SURGERY
Payer: COMMERCIAL

## 2024-03-14 VITALS — HEIGHT: 71 IN | BODY MASS INDEX: 36.26 KG/M2 | WEIGHT: 259 LBS

## 2024-03-14 DIAGNOSIS — M54.16 RADICULOPATHY, LUMBAR REGION: ICD-10-CM

## 2024-03-14 DIAGNOSIS — M25.561 RIGHT KNEE PAIN, UNSPECIFIED CHRONICITY: Primary | ICD-10-CM

## 2024-03-14 DIAGNOSIS — M79.661 RIGHT CALF PAIN: ICD-10-CM

## 2024-03-14 DIAGNOSIS — M25.561 RIGHT KNEE PAIN, UNSPECIFIED CHRONICITY: ICD-10-CM

## 2024-03-14 PROCEDURE — 99204 OFFICE O/P NEW MOD 45 MIN: CPT | Performed by: ORTHOPAEDIC SURGERY

## 2024-03-14 PROCEDURE — 73562 X-RAY EXAM OF KNEE 3: CPT

## 2024-03-14 NOTE — PROGRESS NOTES
Assessment:  1. Right knee pain, unspecified chronicity  XR knee 3 vw right non injury      2. Radiculopathy, lumbar region  Ambulatory referral to Spine & Pain Management      3. Right calf pain  Ambulatory Referral to Orthopedic Surgery        Patient Active Problem List   Diagnosis    Mixed hyperlipidemia    Bradycardia    Dense breast tissue    Dysfunctional uterine bleeding    Endometrial hyperplasia    GERD (gastroesophageal reflux disease)    Post-menopausal bleeding    Obesity    IFG (impaired fasting glucose)    Preop cardiovascular exam    Paraesophageal hernia    Right calf pain           Plan      63 y.o. female with right lumbar radiculopathy  Physical exam performed today, diagnostic imaging reviewed, and thorough subjective history obtained during today's visit.  Diagnosis, operative and non-operative treatment options, as well as expected outcomes and recoveries of each were discussed with the patient. The patient verbalized understanding of exam findings and treatment plan. The patient was given the opportunity to ask questions and all of their questions were addressed during today's visit.  Referral placed today for Marina to follow up with Spine and Pain for further evaluation and treatment  Continue with Physical Therapy until discharged from their care  May use ice or heat as needed for pain relief  May take NSAIDs/Tylenol as needed for pain control  Return for follow up on an as-needed basis (PRN).          Subjective:     Patient ID: Marina Matos 63 y.o. female    HPI    Patient comes in today with regards to right calf pain that began approximately 2-3 weeks ago without mechanism of injury or trauma. She has initiated PT but has only had one visit so far. Today she describes 2/10 pain that originates in the lateral gastroc head area and radiates to the midfoot and proximally to the glute level. She takes Ibuprofen and Acetaminophen in combination with some relief. She denies  paraesthesias. Notes she also has arthritis in the knee that she has not initiated any treatment.       The following portions of the patient's history were reviewed and updated as appropriate: allergies, current medications, past family history, past social history, past surgical history and problem list.      Objective:    Review of Systems  Pertinent items are noted in HPI.  All other systems were reviewed and are negative.    Past Medical History:   Diagnosis Date    Back pain     Bradycardia     Colon polyp     DDD (degenerative disc disease), lumbar     DJD (degenerative joint disease)     Female infertility     GERD (gastroesophageal reflux disease)     History of COVID-19 12/2021    mild cold s/s    History of transfusion     Hypercholesterolemia     Obesity     Paraesophageal hernia     Uveitis     Vertigo        Past Surgical History:   Procedure Laterality Date    CATARACT EXTRACTION, BILATERAL      COLONOSCOPY      2021    DILATION AND CURETTAGE OF UTERUS      EGD      HYSTEROSCOPY  01/09/2012    MYOMECTOMY      VT LAPS RPR PARAESPHGL HRNA INCL FUNDPLSTY W/MESH N/A 5/22/2023    Procedure: PARAESOPHAGEAL HERNIA REPAIR W/ ROBOT AND TIF;  Surgeon: Shankar Gill MD;  Location: [facility] OR;  Service: Bariatrics    TONSILLECTOMY AND ADENOIDECTOMY         Family History   Problem Relation Age of Onset    Hypertension Mother     Heart murmur Father     Diabetes Sister     No Known Problems Sister     No Known Problems Maternal Grandmother     No Known Problems Maternal Grandfather     No Known Problems Paternal Grandmother     No Known Problems Paternal Grandfather     No Known Problems Maternal Aunt     No Known Problems Maternal Aunt     Breast cancer Paternal Aunt 50    Esophageal cancer Cousin 60    Hyperlipidemia Family        Social History     Occupational History    Not on file   Tobacco Use    Smoking status: Never     Passive exposure: Past    Smokeless tobacco: Never   Vaping Use    Vaping status:  "Never Used   Substance and Sexual Activity    Alcohol use: Yes     Comment: 1 x per week    Drug use: Never    Sexual activity: Not on file         Current Outpatient Medications:     ammonium lactate (LAC-HYDRIN) 12 % cream, APPLY DAILY AFTER THE SHOWER, Disp: , Rfl:     atorvastatin (LIPITOR) 20 mg tablet, TAKE 1 TABLET BY MOUTH EVERY DAY, Disp: 90 tablet, Rfl: 3    glucosamine-chondroitin 500-400 MG tablet, Take 1 tablet by mouth daily, Disp: , Rfl:     Multiple Vitamins-Minerals (MULTIVITAMIN WITH MINERALS) tablet, Take 1 tablet by mouth daily, Disp: , Rfl:     Vitamin E 134 MG (200 UNIT) capsule, Take by mouth daily, Disp: , Rfl:     Allergies   Allergen Reactions    Neomycin-Bacitracin Zn-Polymyx Rash       Physical Exam  Ht 5' 10.5\" (1.791 m)   Wt 117 kg (259 lb)   BMI 36.64 kg/m²   Cons: Appears well.  No apparent distress.  Psych: Alert. Oriented x3.  Mood and affect normal.  Eyes: PERRLA, EOMI  Resp: Normal effort.  No audible wheezing or stridor.  CV: Palpable pulse.  No discernable arrhythmia.  No LE edema.  Lymph:  No palpable cervical, axillary, or inguinal lymphadenopathy.  Skin: Warm.  No palpable masses.  No visible lesions.  Neuro: Normal muscle tone.  Normal and symmetric DTR's.    Ortho Exam  No observable deformity, swelling, ecchymosis  Tenderness at greater trochanter, L5-S1  (+) SLR at 70 degrees hip flexion  Slump Test (-) on right, (+) on left    Procedures  No Procedures performed today      I have personally reviewed pertinent films in PACS and my interpretation is moderate to severe tricompartmental osteoarthritis with osteophyte formation, loss of joint space, sclerotic changes. No evidence of acute osseous abnormalities.      Scribe Attestation      I,:  Jossy Mccollum am acting as a scribe while in the presence of the attending physician.:       I,:  Medhat East, DO personally performed the services described in this documentation    as scribed in my presence.:          " "      Portions of the record may have been created with voice recognition software.  Occasional wrong word or \"sound a like\" substitutions may have occurred due to the inherent limitations of voice recognition software.  Read the chart carefully and recognize, using context, where substitutions have occurred.    "

## 2024-03-22 ENCOUNTER — OFFICE VISIT (OUTPATIENT)
Dept: BARIATRICS | Facility: CLINIC | Age: 64
End: 2024-03-22
Payer: COMMERCIAL

## 2024-03-22 VITALS
SYSTOLIC BLOOD PRESSURE: 130 MMHG | WEIGHT: 257 LBS | TEMPERATURE: 97.5 F | BODY MASS INDEX: 35.98 KG/M2 | HEIGHT: 71 IN | HEART RATE: 77 BPM | DIASTOLIC BLOOD PRESSURE: 84 MMHG

## 2024-03-22 DIAGNOSIS — Z48.815 ENCOUNTER FOR SURGICAL AFTERCARE FOLLOWING SURGERY OF DIGESTIVE SYSTEM: Primary | ICD-10-CM

## 2024-03-22 DIAGNOSIS — Z87.19 S/P REPAIR OF PARAESOPHAGEAL HERNIA: ICD-10-CM

## 2024-03-22 DIAGNOSIS — Z98.890 S/P REPAIR OF PARAESOPHAGEAL HERNIA: ICD-10-CM

## 2024-03-22 DIAGNOSIS — K21.9 GERD (GASTROESOPHAGEAL REFLUX DISEASE): ICD-10-CM

## 2024-03-22 PROCEDURE — 99213 OFFICE O/P EST LOW 20 MIN: CPT | Performed by: NURSE PRACTITIONER

## 2024-03-22 NOTE — PROGRESS NOTES
"Assessment/Plan:    No problem-specific Assessment & Plan notes found for this encounter.       Diagnoses and all orders for this visit:    Encounter for surgical aftercare following surgery of digestive system    S/P repair of paraesophageal hernia    GERD (gastroesophageal reflux disease)      - discussed symptoms can be expected s/p surgery/procedure.   - Overall doing well, Continue to monitor for now.   - she is working on trying to lose weight but limiting her food intake and intermittent fasting.   - follow up in 6 months for annual.     Subjective:      Patient ID: Marina Matos is a 63 y.o. female.    HPI  Patient is s/p PEHR and TIF with Dr. Nilesh stewart and Dr. Dee on 05/22/2023 here for 6 month PO visit. Has been doing well, tolerating a regular diet. GERD has significantly improved. Has been able to taper off omeprazole successfully. Using pepcid as needed. She is using it as needed depending on the food she eats she may get triggering symptoms. Of note, notices more belching and also pressure of the epigastric region. Denies having any N/V/D/C, regurgitation, reflux or dysphagia.        The following portions of the patient's history were reviewed and updated as appropriate: allergies, current medications, past family history, past medical history, past social history, past surgical history, and problem list.    Review of Systems   Constitutional: Negative.    Respiratory: Negative.     Cardiovascular: Negative.    Gastrointestinal: Negative.          Objective:      /84   Pulse 77   Temp 97.5 °F (36.4 °C) (Tympanic)   Ht 5' 10.5\" (1.791 m)   Wt 117 kg (257 lb)   BMI 36.35 kg/m²          Physical Exam  Vitals and nursing note reviewed.   Constitutional:       Appearance: Normal appearance. She is obese.   Cardiovascular:      Rate and Rhythm: Normal rate and regular rhythm.      Pulses: Normal pulses.      Heart sounds: Normal heart sounds.   Pulmonary:      Effort: Pulmonary effort is " normal.      Breath sounds: Normal breath sounds.   Abdominal:      General: Bowel sounds are normal.      Palpations: Abdomen is soft.      Tenderness: There is no abdominal tenderness.   Musculoskeletal:         General: Normal range of motion.   Skin:     General: Skin is warm and dry.   Neurological:      General: No focal deficit present.      Mental Status: She is alert and oriented to person, place, and time.   Psychiatric:         Mood and Affect: Mood normal.         Behavior: Behavior normal.         Thought Content: Thought content normal.         Judgment: Judgment normal.

## 2024-03-27 ENCOUNTER — OFFICE VISIT (OUTPATIENT)
Dept: PHYSICAL THERAPY | Facility: REHABILITATION | Age: 64
End: 2024-03-27
Payer: COMMERCIAL

## 2024-03-27 DIAGNOSIS — M79.661 RIGHT CALF PAIN: Primary | ICD-10-CM

## 2024-03-27 PROCEDURE — 97110 THERAPEUTIC EXERCISES: CPT

## 2024-03-27 PROCEDURE — 97140 MANUAL THERAPY 1/> REGIONS: CPT

## 2024-03-27 NOTE — PROGRESS NOTES
Daily Note     Today's date: 3/27/2024  Patient name: Marina Matos  : 1960  MRN: [id number]  Referring provider: Palmira Pérez CRNP  Dx:   Encounter Diagnosis     ICD-10-CM    1. Right calf pain  M79.661           Start Time:   Stop Time:   Total time in clinic (min): 40 minutes    Subjective: patient had consult with Dr. East who felt lumbar radiculopathy was driving symptoms and referred her to Dr. Willett. Patient reports since last PT visit here symptoms have improved. States she has not needed any NSAIDs for her pain. Reports symptoms were mildly flare up after a long meeting for work in which she couldn't get up and move around.       Objective: See treatment diary below      Assessment: Tolerated treatment well today. Centralization and reduction of symptoms in to right hamstring following TE and manuals. Updated HEP see below. Will see patient back in 2 weeks. Patient would benefit from continued PT      Plan: Continue per plan of care.      Precautions:       Manuals 3/13 3/27                                                  Re-Assessment  PRR           Neuro Re-Ed                                                                                                        Ther Ex             Supine 90/90 Sciatic Nerve Gliders HEP            REIL HEP 2x5           REIL w/ pt OP  1x5 HEP           REIL w/ PT OP  1x3           Supine Bridge  10x HEP           Supine Clamshell  10x HEP                                     Ther Activity                                       Gait Training                                       Modalities

## 2024-03-28 ENCOUNTER — OFFICE VISIT (OUTPATIENT)
Dept: OBGYN CLINIC | Facility: CLINIC | Age: 64
End: 2024-03-28
Payer: COMMERCIAL

## 2024-03-28 ENCOUNTER — APPOINTMENT (OUTPATIENT)
Dept: RADIOLOGY | Facility: AMBULARY SURGERY CENTER | Age: 64
End: 2024-03-28
Attending: ORTHOPAEDIC SURGERY
Payer: COMMERCIAL

## 2024-03-28 VITALS — HEIGHT: 71 IN | BODY MASS INDEX: 35.98 KG/M2 | WEIGHT: 257 LBS

## 2024-03-28 DIAGNOSIS — M17.0 PRIMARY OSTEOARTHRITIS OF BOTH KNEES: ICD-10-CM

## 2024-03-28 DIAGNOSIS — Q68.2 PATELLA ALTA: ICD-10-CM

## 2024-03-28 DIAGNOSIS — M25.561 CHRONIC PAIN OF BOTH KNEES: Primary | ICD-10-CM

## 2024-03-28 DIAGNOSIS — M17.0 OSTEOARTHRITIS OF PATELLOFEMORAL JOINTS OF BOTH KNEES: ICD-10-CM

## 2024-03-28 DIAGNOSIS — M25.562 CHRONIC PAIN OF BOTH KNEES: Primary | ICD-10-CM

## 2024-03-28 DIAGNOSIS — M25.562 LEFT KNEE PAIN, UNSPECIFIED CHRONICITY: ICD-10-CM

## 2024-03-28 DIAGNOSIS — G89.29 CHRONIC PAIN OF BOTH KNEES: Primary | ICD-10-CM

## 2024-03-28 PROCEDURE — 73562 X-RAY EXAM OF KNEE 3: CPT

## 2024-03-28 PROCEDURE — 99214 OFFICE O/P EST MOD 30 MIN: CPT | Performed by: ORTHOPAEDIC SURGERY

## 2024-03-28 PROCEDURE — 20610 DRAIN/INJ JOINT/BURSA W/O US: CPT | Performed by: ORTHOPAEDIC SURGERY

## 2024-03-28 RX ORDER — BUPIVACAINE HYDROCHLORIDE 5 MG/ML
2 INJECTION, SOLUTION EPIDURAL; INTRACAUDAL
Status: COMPLETED | OUTPATIENT
Start: 2024-03-28 | End: 2024-03-28

## 2024-03-28 RX ORDER — BETAMETHASONE SODIUM PHOSPHATE AND BETAMETHASONE ACETATE 3; 3 MG/ML; MG/ML
12 INJECTION, SUSPENSION INTRA-ARTICULAR; INTRALESIONAL; INTRAMUSCULAR; SOFT TISSUE
Status: COMPLETED | OUTPATIENT
Start: 2024-03-28 | End: 2024-03-28

## 2024-03-28 RX ADMIN — BUPIVACAINE HYDROCHLORIDE 2 ML: 5 INJECTION, SOLUTION EPIDURAL; INTRACAUDAL at 07:45

## 2024-03-28 RX ADMIN — BETAMETHASONE SODIUM PHOSPHATE AND BETAMETHASONE ACETATE 12 MG: 3; 3 INJECTION, SUSPENSION INTRA-ARTICULAR; INTRALESIONAL; INTRAMUSCULAR; SOFT TISSUE at 07:45

## 2024-03-28 NOTE — PROGRESS NOTES
Assessment:  1. Chronic pain of both knees  XR knee 3 vw left non injury    Injection Procedure Prior Authorization      2. Osteoarthritis of patellofemoral joints of both knees  Injection Procedure Prior Authorization      3. Patella freedom  Injection Procedure Prior Authorization      4. Primary osteoarthritis of both knees  Injection Procedure Prior Authorization        Patient Active Problem List   Diagnosis    Mixed hyperlipidemia    Bradycardia    Dense breast tissue    Dysfunctional uterine bleeding    Endometrial hyperplasia    GERD (gastroesophageal reflux disease)    Post-menopausal bleeding    Obesity    IFG (impaired fasting glucose)    Preop cardiovascular exam    Paraesophageal hernia    Right calf pain           Plan      63 y.o. female with chronic bilateral knee pain. Upon review of the x-rays, a thorough history and my examination, Marina is presenting with signs and symptoms consistent with bilateral knee osteoarthritis most significant in the patellofemoral joint. Diagnosis and treatment options discussed, all her questions were addressed. Bilateral knee corticosteroid injections were offered, accepted, and administered in clinic today. Procedure tolerated well, post injection protocol advised. Visco supplementation initiated. Referral to physical therapy provided.       PT has failed conservative therapy? Yes                   NSAIDS? Yes and topical treatments                   Tylenol? Yes  Home exercises/Physical Therapy? HEP  Weight Loss/Assistive devices? Appropriate weight  PT has failed or has contraindication to a corticosteroid injection therapy? Yes   PT is symptomatic (pain that interferes with ADL's, sleep, crepitus, or knee stiffness? Yes   PT pain score? (1-10) 7/10      Subjective:     Patient ID:    Chief Complaint:Marina Matos 63 y.o. female      HPI    Presents for initial evaluation of bilateral knee pain, right worse than left.  Denies any injury or trauma to either knee.  Pain is daily, intermittent, ache that increases in severity with activity and weight bearing. Pain and crepitance with stairs. She notes difficulty getting off the floor due to pain and stiffness. Intermittent buckling to the right knee. She has attempted to manage her pain with ibuprofen and Voltaren with mild benefit. Denies numbness or paresthesias.     Pain is rated 7/10 bilateral knee     The following portions of the patient's history were reviewed and updated as appropriate: allergies, current medications, past family history, past social history, past surgical history and problem list.        Social History     Socioeconomic History    Marital status: /Civil Union     Spouse name: Not on file    Number of children: Not on file    Years of education: Not on file    Highest education level: Not on file   Occupational History    Not on file   Tobacco Use    Smoking status: Never     Passive exposure: Past    Smokeless tobacco: Never   Vaping Use    Vaping status: Never Used   Substance and Sexual Activity    Alcohol use: Yes     Comment: 1 x per week    Drug use: Never    Sexual activity: Not on file   Other Topics Concern    Not on file   Social History Narrative    Not on file     Social Determinants of Health     Financial Resource Strain: Not on file   Food Insecurity: Not on file   Transportation Needs: Not on file   Physical Activity: Not on file   Stress: Not on file (2/7/2021)   Social Connections: Not on file   Intimate Partner Violence: Low Risk  (4/15/2021)    Received from [facility]    Intimate Partner Violence     Insults You: Not on file     Threatens You: Not on file     Screams at You: Not on file     Physically Hurt: Not on file     Intimate Partner Violence Score: Not on file   Housing Stability: Not on file     Past Medical History:   Diagnosis Date    Back pain     Bradycardia     Colon polyp     DDD (degenerative disc disease), lumbar     DJD (degenerative joint disease)      Female infertility     GERD (gastroesophageal reflux disease)     History of COVID-19 12/2021    mild cold s/s    History of transfusion     Hypercholesterolemia     Obesity     Paraesophageal hernia     Uveitis     Vertigo      Past Surgical History:   Procedure Laterality Date    CATARACT EXTRACTION, BILATERAL      COLONOSCOPY      2021    DILATION AND CURETTAGE OF UTERUS      EGD      HYSTEROSCOPY  01/09/2012    MYOMECTOMY      MO LAPS RPR PARAESPHGL HRNA INCL FUNDPLSTY W/MESH N/A 5/22/2023    Procedure: PARAESOPHAGEAL HERNIA REPAIR W/ ROBOT AND TIF;  Surgeon: Shankar Gill MD;  Location: [facility];  Service: Bariatrics    TONSILLECTOMY AND ADENOIDECTOMY       Allergies   Allergen Reactions    Neomycin-Bacitracin Zn-Polymyx Rash     Current Outpatient Medications on File Prior to Visit   Medication Sig Dispense Refill    ammonium lactate (LAC-HYDRIN) 12 % cream APPLY DAILY AFTER THE SHOWER      atorvastatin (LIPITOR) 20 mg tablet TAKE 1 TABLET BY MOUTH EVERY DAY 90 tablet 3    glucosamine-chondroitin 500-400 MG tablet Take 1 tablet by mouth daily      Multiple Vitamins-Minerals (MULTIVITAMIN WITH MINERALS) tablet Take 1 tablet by mouth daily      Vitamin E 134 MG (200 UNIT) capsule Take by mouth daily       No current facility-administered medications on file prior to visit.              Objective:    Review of Systems   Constitutional:  Negative for chills and fever.   HENT:  Negative for ear pain and sore throat.    Eyes:  Negative for pain and visual disturbance.   Respiratory:  Negative for cough and shortness of breath.    Cardiovascular:  Negative for chest pain and palpitations.   Gastrointestinal:  Negative for abdominal pain and vomiting.   Genitourinary:  Negative for dysuria and hematuria.   Musculoskeletal:  Negative for arthralgias and back pain.   Skin:  Negative for color change and rash.   Neurological:  Negative for seizures and syncope.   All other systems reviewed and are  negative.      Right Knee Exam     Range of Motion   Extension:  0   Right knee flexion: 105.     Tests   Varus: negative Valgus: negative  Drawer:  Anterior - negative    Posterior - negative    Other   Erythema: absent  Sensation: normal  Pulse: present  Swelling: none  Effusion: no effusion present    Comments:  Palpable bakers cyst   Skin is warm and dry to touch with no signs of erythema, ecchymosis, or infection   Edema noted at hoffa's fat pad  Flexor and extensor mechanisms are intact   Knee is stable to varus and valgus stress  Patella tracks centrally with palpable and audible crepitus  Calf compartments are soft and supple  2+ DP and PT pulses with brisk capillary refill to the toes  Sural, saphenous, tibial, superficial, and deep peroneal motor and sensory distributions intact  Sensation light touch intact distally        Left Knee Exam     Range of Motion   Extension:  0   Flexion:  110     Tests   Varus: negative Valgus: negative  Drawer:  Anterior - negative     Posterior - negative    Other   Erythema: absent  Sensation: normal  Pulse: present  Swelling: none  Effusion: no effusion present    Comments:  Palpable bakers cyst, smaller then contralateral side   Skin is warm and dry to touch with no signs of erythema, ecchymosis, or infection   Edema noted at hoffa's fat pad  Flexor and extensor mechanisms are intact   Knee is stable to varus and valgus stress  Patella tracks centrally with palpable and audible crepitus  Calf compartments are soft and supple  2+ DP and PT pulses with brisk capillary refill to the toes  Sural, saphenous, tibial, superficial, and deep peroneal motor and sensory distributions intact  Sensation light touch intact distally              Physical Exam  Vitals and nursing note reviewed.   HENT:      Head: Normocephalic.   Eyes:      Extraocular Movements: Extraocular movements intact.   Cardiovascular:      Rate and Rhythm: Normal rate.      Pulses: Normal pulses.   Pulmonary:       Effort: Pulmonary effort is normal.   Musculoskeletal:         General: Normal range of motion.      Cervical back: Normal range of motion.      Right knee: No effusion.      Left knee: No effusion.      Comments: See ortho exam   Skin:     General: Skin is warm and dry.   Neurological:      General: No focal deficit present.      Mental Status: She is alert.   Psychiatric:         Behavior: Behavior normal.         Large joint arthrocentesis: bilateral knee  Universal Protocol:  Consent: Verbal consent obtained.  Risks and benefits: risks, benefits and alternatives were discussed  Consent given by: patient  Timeout called at: 3/28/2024 8:14 AM.  Patient understanding: patient states understanding of the procedure being performed  Patient identity confirmed: verbally with patient  Supporting Documentation  Indications: pain and diagnostic evaluation   Procedure Details  Location: knee - bilateral knee  Preparation: Patient was prepped and draped in the usual sterile fashion  Needle size: 22 G  Ultrasound guidance: no    Medications (Right): 2 mL bupivacaine (PF) 0.5 %; 12 mg betamethasone acetate-betamethasone sodium phosphate 6 (3-3) mg/mLMedications (Left): 2 mL bupivacaine (PF) 0.5 %; 12 mg betamethasone acetate-betamethasone sodium phosphate 6 (3-3) mg/mL   Patient tolerance: patient tolerated the procedure well with no immediate complications  Dressing:  Sterile dressing applied            I have personally reviewed pertinent films in PACS.    X-rays of the left knee obtained today demonstrates moderate tibiofemoral osteoarthritis and severe patellofemoral osteoarthritis. Patella freedom noted.    X-ray of the right knee obtained 3/14/2024 reviewed demonstrates severe patellofemoral degenerative changes with joint space loss, subchondral sclerosis and osteophytosis.     Scribe Attestation      I,:  Iraida Nova am acting as a scribe while in the presence of the attending physician.:       I,:  Medhat East  "DO personally performed the services described in this documentation    as scribed in my presence.:               Portions of the record may have been created with voice recognition software.  Occasional wrong word or \"sound a like\" substitutions may have occurred due to the inherent limitations of voice recognition software.  Read the chart carefully and recognize, using context, where substitutions have occurred.    "

## 2024-04-15 ENCOUNTER — APPOINTMENT (OUTPATIENT)
Dept: PHYSICAL THERAPY | Facility: REHABILITATION | Age: 64
End: 2024-04-15
Payer: COMMERCIAL

## 2024-04-18 ENCOUNTER — OFFICE VISIT (OUTPATIENT)
Dept: PHYSICAL THERAPY | Facility: REHABILITATION | Age: 64
End: 2024-04-18
Payer: COMMERCIAL

## 2024-04-18 DIAGNOSIS — M79.661 RIGHT CALF PAIN: Primary | ICD-10-CM

## 2024-04-18 PROCEDURE — 97140 MANUAL THERAPY 1/> REGIONS: CPT

## 2024-04-18 PROCEDURE — 97110 THERAPEUTIC EXERCISES: CPT

## 2024-04-18 NOTE — PROGRESS NOTES
Daily Note     Today's date: 2024  Patient name: Marina Matos  : 1960  MRN: [id number]  Referring provider: Palmira Pérez CRNP  Dx:   Encounter Diagnosis     ICD-10-CM    1. Right calf pain  M79.661           Start Time: 1400  Stop Time: 1440  Total time in clinic (min): 40 minutes    Subjective: Patient reports her distal symptoms in her right calf have improved since last visit. States her low back pain is back to the baseline. Patient reports her knees bother her with activities requiring deep and frequent knee benidng.       Objective: See treatment diary below      Assessment: Tolerated treatment well today. Minimal/no pain with low back test today and improvement since last visit. Discussed resuming Yoga as this has previously helped her low back. Reviewed and updated HEP to help reduce stress Patient would benefit from continued PT      Plan: Continue per plan of care.      Precautions:       Manuals 3/13 3/27 4/18                                                 Re-Assessment  PRR PRR          Neuro Re-Ed                                                                                                        Ther Ex             Supine 90/90 Sciatic Nerve Gliders HEP            REIL HEP 2x5           REIL w/ pt OP  1x5 HEP           REIL w/ PT OP  1x3           Supine Bridge  10x HEP           Supine Clamshell  10x HEP           PT Edu, HEP, POC   30'                       Ther Activity                                       Gait Training                                       Modalities

## 2024-05-22 ENCOUNTER — TRANSCRIBE ORDERS (OUTPATIENT)
Dept: PAIN MEDICINE | Facility: CLINIC | Age: 64
End: 2024-05-22

## 2024-05-22 ENCOUNTER — CONSULT (OUTPATIENT)
Dept: PAIN MEDICINE | Facility: CLINIC | Age: 64
End: 2024-05-22
Payer: COMMERCIAL

## 2024-05-22 VITALS
SYSTOLIC BLOOD PRESSURE: 133 MMHG | BODY MASS INDEX: 35.93 KG/M2 | DIASTOLIC BLOOD PRESSURE: 91 MMHG | WEIGHT: 254 LBS | HEART RATE: 70 BPM

## 2024-05-22 DIAGNOSIS — M54.16 RADICULOPATHY, LUMBAR REGION: ICD-10-CM

## 2024-05-22 DIAGNOSIS — M46.1 SACROILIITIS (HCC): Primary | ICD-10-CM

## 2024-05-22 PROCEDURE — 99204 OFFICE O/P NEW MOD 45 MIN: CPT | Performed by: ANESTHESIOLOGY

## 2024-05-22 RX ORDER — GABAPENTIN 100 MG/1
CAPSULE ORAL
Qty: 60 CAPSULE | Refills: 1 | Status: SHIPPED | OUTPATIENT
Start: 2024-05-22

## 2024-05-22 RX ORDER — HYALURONATE SODIUM 20 MG/2 ML
SYRINGE (ML) INTRAARTICULAR
COMMUNITY
Start: 2024-05-17

## 2024-05-22 NOTE — PROGRESS NOTES
Assessment  1. Sacroiliitis (HCC)    2. Radiculopathy, lumbar region        Plan  The patient symptoms, history/physical are consistent with pain that is multifactorial in origin.  She is experiencing right radicular symptoms into her calf so at this time I will order an MRI lumbar spine to better evaluate.  I will also order x-rays of the hip/pelvis to assess for any sacroiliac mediated pain and hip pathology.  I advised we will call with results and discuss treatment moving forward.    My impressions and treatment recommendations were discussed in detail with the patient who verbalized understanding and had no further questions.  Discharge instructions were provided. I personally saw and examined the patient and I agree with the above discussed plan of care.    Orders Placed This Encounter   Procedures   • XR hips bilateral 3-4 vw w pelvis if performed     Standing Status:   Future     Standing Expiration Date:   5/22/2028     Scheduling Instructions:      Bring along any outside films relating to this procedure.         • MRI lumbar spine without contrast     Standing Status:   Future     Standing Expiration Date:   5/22/2028     Scheduling Instructions:      There is no preparation for this test. Please leave your jewelry and valuables at home, wedding rings are the exception. All patients will be required to change into a hospital gown and pants.  Street clothes are not permitted in the MRI.  Magnetic nail polish must be removed prior to arrival for your test. Please bring your insurance cards, a form of photo ID and a list of your medications with you. Arrive 15 minutes prior to your appointment time in order to register. Please bring any prior CT or MRI studies of this area that were not performed at a [facility] facility.            To schedule this appointment, please contact Central Scheduling at [phone].            Prior to your appointment, please make sure you complete the MRI Screening Form when  you e-Check in for your appointment. This will be available starting 7 days before your appointment in [facility]. You may receive an e-mail with an activation code if you do not have a [employer] account. If you do not have access to a device, we will complete your screening at your appointment.     Order Specific Question:   What is the patient's sedation requirement?     Answer:   No Sedation     Order Specific Question:   Does this procedure require the 3T MRI at [place] or [place]?     Answer:   No     Order Specific Question:   Release to patient through [facility]     Answer:   Immediate     Order Specific Question:   Is order priority selected as STAT?     Answer:   No     Order Specific Question:   Reason for Exam     Answer:   lbp into right leg   • XR spine lumbar minimum 4 views non injury     Standing Status:   Future     Standing Expiration Date:   5/22/2028     Scheduling Instructions:      Bring along any outside films relating to this procedure.           Order Specific Question:   Reason for Exam:     Answer:   lbp into right leg       New Medications Ordered This Visit   Medications   • Euflexxa 20 MG/2ML SOSY   • gabapentin (NEURONTIN) 100 mg capsule     Sig: Take 1 tablet at bedtime.  May increase to 2 tablets after 3 days     Dispense:  60 capsule     Refill:  1       History of Present Illness    Marina Matos is a 63 y.o. female referred by Dr. East for lower back pain and right calf pain that has been present for more than 30 years.  The current pain has been present for 5 months.  Symptoms are moderate rated 3-5/10 on the numeric rating scale that is felt intermittently described to be dull, aching and shooting at times.  Pain symptoms are aggravated standing, sitting.  Treatment history has included physical therapy which is provided some moderate relief.  Ibuprofen helps to an extent.    I have personally reviewed and/or updated the patient's past medical history, past surgical history, family  history, social history, current medications, allergies, and vital signs today.     Review of Systems   Constitutional:  Negative for fever and unexpected weight change.   HENT:  Negative for trouble swallowing.    Eyes:  Negative for visual disturbance.   Respiratory:  Negative for shortness of breath and wheezing.    Cardiovascular:  Negative for chest pain and palpitations.   Gastrointestinal:  Negative for constipation, diarrhea, nausea and vomiting.   Endocrine: Negative for cold intolerance, heat intolerance and polydipsia.   Genitourinary:  Negative for difficulty urinating and frequency.   Musculoskeletal:  Positive for back pain. Negative for arthralgias, gait problem, joint swelling and myalgias.   Skin:  Negative for rash.   Neurological:  Negative for dizziness, seizures, syncope, weakness and headaches.   Hematological:  Does not bruise/bleed easily.   Psychiatric/Behavioral:  Negative for dysphoric mood.    All other systems reviewed and are negative.      Patient Active Problem List   Diagnosis   • Mixed hyperlipidemia   • Bradycardia   • Dense breast tissue   • Dysfunctional uterine bleeding   • Endometrial hyperplasia   • GERD (gastroesophageal reflux disease)   • Post-menopausal bleeding   • Obesity   • IFG (impaired fasting glucose)   • Preop cardiovascular exam   • Paraesophageal hernia   • Right calf pain       Past Medical History:   Diagnosis Date   • Back pain    • Bradycardia    • Colon polyp    • DDD (degenerative disc disease), lumbar    • DJD (degenerative joint disease)    • Female infertility    • GERD (gastroesophageal reflux disease)    • History of COVID-19 12/2021    mild cold s/s   • History of transfusion    • Hypercholesterolemia    • Obesity    • Paraesophageal hernia    • Uveitis    • Vertigo        Past Surgical History:   Procedure Laterality Date   • CATARACT EXTRACTION, BILATERAL     • COLONOSCOPY      2021   • DILATION AND CURETTAGE OF UTERUS     • EGD     • HYSTEROSCOPY   01/09/2012   • MYOMECTOMY     • AR LAPS RPR PARAESPHGL HRNA INCL FUNDPLSTY W/MESH N/A 5/22/2023    Procedure: PARAESOPHAGEAL HERNIA REPAIR W/ ROBOT AND TIF;  Surgeon: Shankar Gill MD;  Location: [facility];  Service: Bariatrics   • TONSILLECTOMY AND ADENOIDECTOMY         Family History   Problem Relation Age of Onset   • Hypertension Mother    • Heart murmur Father    • Diabetes Sister    • No Known Problems Sister    • No Known Problems Maternal Aunt    • No Known Problems Maternal Aunt    • Breast cancer Paternal Aunt 50   • No Known Problems Maternal Grandmother    • No Known Problems Maternal Grandfather    • No Known Problems Paternal Grandmother    • No Known Problems Paternal Grandfather    • Esophageal cancer Cousin 60   • Hyperlipidemia Family        Social History     Occupational History   • Not on file   Tobacco Use   • Smoking status: Never     Passive exposure: Past   • Smokeless tobacco: Never   Vaping Use   • Vaping status: Never Used   Substance and Sexual Activity   • Alcohol use: Yes     Comment: 1 x per week   • Drug use: Never   • Sexual activity: Not on file       Current Outpatient Medications on File Prior to Visit   Medication Sig   • atorvastatin (LIPITOR) 20 mg tablet TAKE 1 TABLET BY MOUTH EVERY DAY   • glucosamine-chondroitin 500-400 MG tablet Take 1 tablet by mouth daily   • Multiple Vitamins-Minerals (MULTIVITAMIN WITH MINERALS) tablet Take 1 tablet by mouth daily   • Vitamin E 134 MG (200 UNIT) capsule Take by mouth daily   • Euflexxa 20 MG/2ML SOSY  (Patient not taking: Reported on 5/22/2024)     No current facility-administered medications on file prior to visit.       Allergies   Allergen Reactions   • Neomycin-Bacitracin Zn-Polymyx Rash       Physical Exam    /91   Pulse 70   Wt 115 kg (254 lb)   BMI 35.93 kg/m²     Constitutional: normal, well developed, well nourished, alert, in no distress and non-toxic and no overt pain behavior.  Eyes: anicteric  HEENT: grossly  intact  Neck: supple, symmetric, trachea midline and no masses   Pulmonary:even and unlabored  Cardiovascular:No edema or pitting edema present  Skin:Normal without rashes or lesions and well hydrated  Psychiatric:Mood and affect appropriate  Neurologic:Cranial Nerves II-XII grossly intact  Musculoskeletal:normal    Lumbar Spine Exam  Appearance:  Normal lordosis  Palpation/Tenderness:  right lumbar paraspinal tenderness  right sacroiliac joint tenderness  Range of Motion:  Flexion:  Minimally limited  with pain  Extension:  Moderately limited  with pain  Motor Strength:  Left hip flexion:  5/5  Left hip extension:  5/5  Right hip flexion:  5/5  Right hip extension:  5/5  Left knee flexion:  5/5  Left knee extension:  5/5  Right knee flexion:  5/5  Right knee extension:  5/5  Left foot dorsiflexion:  5/5  Left foot plantar flexion:  5/5  Right foot dorsiflexion:  5/5  Right foot plantar flexion:  5/5  Reflexes:  Left Patellar:  1+   Right Patellar:  1+   Left Achilles:  1+   Right Achilles:  1+

## 2024-06-06 ENCOUNTER — HOSPITAL ENCOUNTER (OUTPATIENT)
Dept: MRI IMAGING | Facility: HOSPITAL | Age: 64
Discharge: HOME | End: 2024-06-06
Attending: ANESTHESIOLOGY
Payer: COMMERCIAL

## 2024-06-06 DIAGNOSIS — M54.16 RADICULOPATHY, LUMBAR REGION: ICD-10-CM

## 2024-06-06 PROCEDURE — 72148 MRI LUMBAR SPINE W/O DYE: CPT

## 2024-06-07 ENCOUNTER — PROCEDURE VISIT (OUTPATIENT)
Dept: OBGYN CLINIC | Facility: CLINIC | Age: 64
End: 2024-06-07
Payer: COMMERCIAL

## 2024-06-07 VITALS — HEIGHT: 71 IN | BODY MASS INDEX: 35.56 KG/M2 | WEIGHT: 254 LBS

## 2024-06-07 DIAGNOSIS — M17.0 PRIMARY OSTEOARTHRITIS OF BOTH KNEES: Primary | ICD-10-CM

## 2024-06-07 PROCEDURE — 20610 DRAIN/INJ JOINT/BURSA W/O US: CPT | Performed by: ORTHOPAEDIC SURGERY

## 2024-06-07 RX ORDER — HYALURONATE SODIUM 10 MG/ML
20 SYRINGE (ML) INTRAARTICULAR
Status: COMPLETED | OUTPATIENT
Start: 2024-06-07 | End: 2024-06-07

## 2024-06-07 RX ADMIN — Medication 20 MG: at 11:15

## 2024-06-07 NOTE — PROGRESS NOTES
"1. Primary osteoarthritis of both knees          Patient is here for her first injection of Euflexxa into the bilateral knee.     Patient rates their pain as 4/10 today    Physical exam of the knee shows no effusion no ecchymosis.      Large joint arthrocentesis: L knee  Universal Protocol:  Consent given by: patient  Time out: Immediately prior to procedure a \"time out\" was called to verify the correct patient, procedure, equipment, support staff and site/side marked as required.  Supporting Documentation  Indications: pain   Procedure Details  Location: knee - L knee  Needle size: 22 G  Ultrasound guidance: no  Approach: anterolateral  Medications administered: 20 mg Sodium Hyaluronate (Viscosup) 20 MG/2ML    Patient tolerance: patient tolerated the procedure well with no immediate complications      Large joint arthrocentesis: R knee  Universal Protocol:  Consent given by: patient  Supporting Documentation  Indications: pain   Procedure Details  Location: knee - R knee  Needle size: 22 G  Ultrasound guidance: no  Approach: anterolateral  Medications administered: 20 mg Sodium Hyaluronate (Viscosup) 20 MG/2ML    Patient tolerance: patient tolerated the procedure well with no immediate complications          Patient tolerated procedure follow up 1 week  "

## 2024-06-14 ENCOUNTER — TELEPHONE (OUTPATIENT)
Age: 64
End: 2024-06-14

## 2024-06-14 ENCOUNTER — PROCEDURE VISIT (OUTPATIENT)
Dept: OBGYN CLINIC | Facility: CLINIC | Age: 64
End: 2024-06-14
Payer: COMMERCIAL

## 2024-06-14 VITALS
HEART RATE: 84 BPM | SYSTOLIC BLOOD PRESSURE: 136 MMHG | DIASTOLIC BLOOD PRESSURE: 67 MMHG | HEIGHT: 71 IN | WEIGHT: 254 LBS | BODY MASS INDEX: 35.56 KG/M2

## 2024-06-14 DIAGNOSIS — M17.0 PRIMARY OSTEOARTHRITIS OF BOTH KNEES: Primary | ICD-10-CM

## 2024-06-14 DIAGNOSIS — R93.7 ABNORMAL MRI, LUMBAR SPINE: Primary | ICD-10-CM

## 2024-06-14 PROCEDURE — 20610 DRAIN/INJ JOINT/BURSA W/O US: CPT | Performed by: ORTHOPAEDIC SURGERY

## 2024-06-14 RX ORDER — HYALURONATE SODIUM 10 MG/ML
20 SYRINGE (ML) INTRAARTICULAR
Status: COMPLETED | OUTPATIENT
Start: 2024-06-14 | End: 2024-06-14

## 2024-06-14 RX ADMIN — Medication 20 MG: at 08:30

## 2024-06-14 NOTE — TELEPHONE ENCOUNTER
Doctor:Tamie  Caller Name: Manolo ORTA#:[phone]    [facility] called to speak to clinical team regarding MRI ordered by provider.

## 2024-06-14 NOTE — PROGRESS NOTES
"1. Primary osteoarthritis of both knees          Patient is here for her second injection of Euflexxa into the bilateral knee.     Patient rates their pain as 3-4/10 today    Physical exam of the knee shows no effusion no ecchymosis.      Large joint arthrocentesis: R knee  Universal Protocol:  Consent given by: patient  Supporting Documentation  Indications: pain   Procedure Details  Location: knee - R knee  Needle size: 22 G  Ultrasound guidance: no  Approach: anterolateral  Medications administered: 20 mg Sodium Hyaluronate (Viscosup) 20 MG/2ML    Patient tolerance: patient tolerated the procedure well with no immediate complications      Large joint arthrocentesis: L knee  Universal Protocol:  Consent given by: patient  Time out: Immediately prior to procedure a \"time out\" was called to verify the correct patient, procedure, equipment, support staff and site/side marked as required.  Supporting Documentation  Indications: pain   Procedure Details  Location: knee - L knee  Needle size: 22 G  Ultrasound guidance: no  Approach: anterolateral  Medications administered: 20 mg Sodium Hyaluronate (Viscosup) 20 MG/2ML    Patient tolerance: patient tolerated the procedure well with no immediate complications          Patient tolerated procedure follow up  1week  "

## 2024-06-17 NOTE — TELEPHONE ENCOUNTER
RN s/w pt about her lumbar MRI showed multilevel spondylosis, chronic compression deformity of L1 and and hyperintensity of the kidneys possibly cysts. The radiologist recommends U/S of kidneys which our office ordered. Pt given ph # for central scheduling [phone] to schedule the kidney U/S.  Pt given ovs w/ Brianna CERVANTES for 6/19/24 to review lumbar MRI and discuss treatment plan.   Told pt I will be sending a message to Chikis CERVANTES (PCP) to review MRI finding and have them f/u with pt regarding the kidney.   Pt verbalized understanding.

## 2024-06-17 NOTE — TELEPHONE ENCOUNTER
"Chikis, our provider would like you to review pt's recent lumbar MRI regarding;   \"Prominent T2 hyperintensity centrally in the kidneys possibly numerous parapelvic cysts versus pelvocaliectasis. There is mild left renal cortical atrophy as well. Renal ultrasound suggested for further assessment.\"  Our office has ordered a renal U/S and told pt to call central scheduling.   Pls follow up with pt regarding these findings. Pt was made aware of results.   "

## 2024-06-18 DIAGNOSIS — E78.2 MIXED HYPERLIPIDEMIA: ICD-10-CM

## 2024-06-18 RX ORDER — ATORVASTATIN CALCIUM 20 MG/1
TABLET, FILM COATED ORAL
Qty: 90 TABLET | Refills: 3 | Status: SHIPPED | OUTPATIENT
Start: 2024-06-18

## 2024-06-19 ENCOUNTER — TELEPHONE (OUTPATIENT)
Dept: RADIOLOGY | Facility: CLINIC | Age: 64
End: 2024-06-19

## 2024-06-19 ENCOUNTER — OFFICE VISIT (OUTPATIENT)
Dept: PAIN MEDICINE | Facility: CLINIC | Age: 64
End: 2024-06-19
Payer: COMMERCIAL

## 2024-06-19 ENCOUNTER — HOSPITAL ENCOUNTER (OUTPATIENT)
Dept: RADIOLOGY | Facility: HOSPITAL | Age: 64
Discharge: HOME | End: 2024-06-19
Payer: COMMERCIAL

## 2024-06-19 VITALS
RESPIRATION RATE: 18 BRPM | HEART RATE: 69 BPM | HEIGHT: 70 IN | SYSTOLIC BLOOD PRESSURE: 154 MMHG | WEIGHT: 254 LBS | BODY MASS INDEX: 36.36 KG/M2 | DIASTOLIC BLOOD PRESSURE: 99 MMHG

## 2024-06-19 DIAGNOSIS — M47.816 LUMBAR SPONDYLOSIS: ICD-10-CM

## 2024-06-19 DIAGNOSIS — M54.16 RADICULOPATHY, LUMBAR REGION: ICD-10-CM

## 2024-06-19 DIAGNOSIS — G89.4 CHRONIC PAIN SYNDROME: ICD-10-CM

## 2024-06-19 DIAGNOSIS — M46.1 SACROILIITIS (HCC): ICD-10-CM

## 2024-06-19 DIAGNOSIS — M46.1 SACROILIITIS (HCC): Primary | ICD-10-CM

## 2024-06-19 DIAGNOSIS — M51.16 INTERVERTEBRAL DISC DISORDER WITH RADICULOPATHY OF LUMBAR REGION: ICD-10-CM

## 2024-06-19 PROCEDURE — 73521 X-RAY EXAM HIPS BI 2 VIEWS: CPT

## 2024-06-19 PROCEDURE — 72110 X-RAY EXAM L-2 SPINE 4/>VWS: CPT

## 2024-06-19 PROCEDURE — 99214 OFFICE O/P EST MOD 30 MIN: CPT

## 2024-06-19 NOTE — TELEPHONE ENCOUNTER
----- Message from HELEN Fatima sent at 6/19/2024  1:57 PM EDT -----  FYI: Results reviewed with patient at office visit today

## 2024-06-19 NOTE — PROGRESS NOTES
Assessment:  1. Sacroiliitis (HCC)    2. Chronic pain syndrome    3. Lumbar spondylosis    4. Intervertebral disc disorder with radiculopathy of lumbar region        Plan:  The patient is a 63 year old female with a history of chronic pain secondary to low back pain, lumbar intervertebral disc disorder with radiculopathy, lumbar spondylosis, sacroiliitis and bilateral hip pain who presents to the office with ongoing bilateral low back pain and pain that radiates into bilateral hips and groins, right worse than left and intermittent pain in the right calf and right foot.    On exam, the patient is tender with palpation over bilateral SI joints as well as having positive provocative maneuvers for sacroiliitis.  I instructed patient we can perform bilateral SI joint injections to decrease inflammation and provide them relief.  Patient would like to proceed.  I instructed our  will schedule them.  Complete risks and benefits including bleeding, infection, tissue reaction, nerve injury and allergic reaction were discussed.  The approach was demonstrated using models and literature was provided.  Verbal and written consent was obtained.    My impressions and treatment recommendations were discussed in detail with the patient who verbalized understanding and had no further questions.  Discharge instructions were provided. I personally saw and examined the patient and I agree with the above discussed plan of care.    Orders Placed This Encounter   Procedures    [place] spine and pain procedure     Dr Willett     Standing Status:   Future     Standing Expiration Date:   6/19/2028     Order Specific Question:   Reason for Exam:     Answer:   Bilateral SI joint injections     Order Specific Question:   Anticoagulant hold needed?     Answer:   No     No orders of the defined types were placed in this encounter.      History of Present Illness:  Marina Matos is a 63 y.o. female with a history of chronic pain secondary  to low back pain, lumbar intervertebral disc disorder with radiculopathy, lumbar spondylosis, sacroiliitis and bilateral hip pain.  She was last seen on 5/22/2024 where she was ordered an MRI of her lumbar spine as well as x-rays of her hip and lumbar spine.  MRI of lumbar spine done on 6/6/2024 shows facet hypertrophy at L1-L2, L4-L5 and L5-S1 and an incidental finding of prominent T2 hyperintensity in the kidneys.  She does have an ultrasound of her kidneys scheduled.  She presents to the office with ongoing bilateral low back pain and pain that radiates into bilateral hips and groins, right worse than left and intermittent pain in the right calf and right foot.    She states her pain is the same since the last office visit and constant but worse in the evening and at night.  She rates quality of her pain as dull/ache, sharp and is currently rating her pain a 7/10 on a numeric scale.    Current pain medications include gabapentin 100 mg 2 capsules daily Tylenol and ibuprofen as needed which is helpful.    I have personally reviewed and/or updated the patient's past medical history, past surgical history, family history, social history, current medications, allergies, and vital signs today.     Review of Systems   Respiratory:  Negative for shortness of breath.    Cardiovascular:  Negative for chest pain.   Gastrointestinal:  Negative for constipation, diarrhea, nausea and vomiting.   Musculoskeletal:  Positive for back pain and gait problem. Negative for arthralgias, joint swelling and myalgias.        Right Leg Pain  Decreased Range Of Motion   Skin:  Negative for rash.   Neurological:  Negative for dizziness, seizures and weakness.   All other systems reviewed and are negative.      Patient Active Problem List   Diagnosis    Mixed hyperlipidemia    Bradycardia    Dense breast tissue    Dysfunctional uterine bleeding    Endometrial hyperplasia    GERD (gastroesophageal reflux disease)    Post-menopausal bleeding     Obesity    IFG (impaired fasting glucose)    Preop cardiovascular exam    Paraesophageal hernia    Right calf pain    Primary osteoarthritis of both knees       Past Medical History:   Diagnosis Date    Back pain     Bradycardia     Colon polyp     DDD (degenerative disc disease), lumbar     DJD (degenerative joint disease)     Female infertility     GERD (gastroesophageal reflux disease)     History of COVID-19 12/2021    mild cold s/s    History of transfusion     Hypercholesterolemia     Obesity     Paraesophageal hernia     Uveitis     Vertigo        Past Surgical History:   Procedure Laterality Date    CATARACT EXTRACTION, BILATERAL      COLONOSCOPY      2021    DILATION AND CURETTAGE OF UTERUS      EGD      HYSTEROSCOPY  01/09/2012    MYOMECTOMY      NV LAPS RPR PARAESPHGL HRNA INCL FUNDPLSTY W/MESH N/A 5/22/2023    Procedure: PARAESOPHAGEAL HERNIA REPAIR W/ ROBOT AND TIF;  Surgeon: Shankar Gill MD;  Location: [facility];  Service: Bariatrics    TONSILLECTOMY AND ADENOIDECTOMY         Family History   Problem Relation Age of Onset    Hypertension Mother     Heart murmur Father     Diabetes Sister     No Known Problems Sister     No Known Problems Maternal Aunt     No Known Problems Maternal Aunt     Breast cancer Paternal Aunt 50    No Known Problems Maternal Grandmother     No Known Problems Maternal Grandfather     No Known Problems Paternal Grandmother     No Known Problems Paternal Grandfather     Esophageal cancer Cousin 60    Hyperlipidemia Family        Social History     Occupational History    Not on file   Tobacco Use    Smoking status: Never     Passive exposure: Past    Smokeless tobacco: Never   Vaping Use    Vaping status: Never Used   Substance and Sexual Activity    Alcohol use: Yes     Comment: 1 x per week    Drug use: Never    Sexual activity: Not on file       Current Outpatient Medications on File Prior to Visit   Medication Sig    atorvastatin (LIPITOR) 20 mg tablet TAKE 1 TABLET BY  "MOUTH EVERY DAY    Euflexxa 20 MG/2ML SOSY     gabapentin (NEURONTIN) 100 mg capsule Take 1 tablet at bedtime.  May increase to 2 tablets after 3 days    glucosamine-chondroitin 500-400 MG tablet Take 1 tablet by mouth daily    Multiple Vitamins-Minerals (MULTIVITAMIN WITH MINERALS) tablet Take 1 tablet by mouth daily    Vitamin E 134 MG (200 UNIT) capsule Take by mouth daily     No current facility-administered medications on file prior to visit.       Allergies   Allergen Reactions    Neomycin-Bacitracin Zn-Polymyx Rash       Physical Exam:    /99   Pulse 69   Resp 18   Ht 5' 10\" (1.778 m)   Wt 115 kg (254 lb)   BMI 36.45 kg/m²     Constitutional:normal, well developed, well nourished, alert, in no distress and non-toxic and no overt pain behavior.  Eyes:anicteric  HEENT:grossly intact  Neck:supple, symmetric, trachea midline and no masses   Pulmonary:even and unlabored  Cardiovascular:No edema or pitting edema present  Skin:Normal without rashes or lesions and well hydrated  Psychiatric:Mood and affect appropriate  Neurologic:Cranial Nerves II-XII grossly intact  Musculoskeletal:antalgic    Lumbar Spine Exam    Appearance:  Normal lordosis  Palpation/Tenderness:  left sacroiliac joint tenderness  right sacroiliac joint tenderness  Sensory:  no sensory deficits noted  Range of Motion:  Flexion:  Minimally limited  with pain  Extension:  Minimally limited  with pain  Motor Strength:  Left hip flexion:  5/5  Right hip flexion:  5/5  Left knee flexion:  5/5  Left knee extension:  5/5  Right knee flexion:  5/5  Right knee extension:  5/5  Left foot dorsiflexion:  5/5  Left foot plantar flexion:  5/5  Right foot dorsiflexion:  5/5  Right foot plantar flexion:  5/5  Special Tests:  Left Straight Leg Test:  positive  Right Straight Leg Test:  positive  Left Panchito's Maneuver:  positive  Right Panchito's Maneuver:  positive  Left Gaenslen's Test:  positive  Right Gaenslen's Test:  positive  Left Pelvic " Distraction Test:  positive  Right Pelvic Distraction Test:  positive      Imaging

## 2024-06-21 ENCOUNTER — PROCEDURE VISIT (OUTPATIENT)
Dept: OBGYN CLINIC | Facility: CLINIC | Age: 64
End: 2024-06-21
Payer: COMMERCIAL

## 2024-06-21 ENCOUNTER — HOSPITAL ENCOUNTER (OUTPATIENT)
Dept: ULTRASOUND IMAGING | Facility: HOSPITAL | Age: 64
Discharge: HOME | End: 2024-06-21
Payer: COMMERCIAL

## 2024-06-21 VITALS — BODY MASS INDEX: 35.56 KG/M2 | WEIGHT: 254 LBS | HEIGHT: 71 IN

## 2024-06-21 DIAGNOSIS — M17.0 PRIMARY OSTEOARTHRITIS OF BOTH KNEES: Primary | ICD-10-CM

## 2024-06-21 DIAGNOSIS — R93.7 ABNORMAL MRI, LUMBAR SPINE: ICD-10-CM

## 2024-06-21 PROCEDURE — 76775 US EXAM ABDO BACK WALL LIM: CPT

## 2024-06-21 PROCEDURE — 20610 DRAIN/INJ JOINT/BURSA W/O US: CPT | Performed by: SPECIALIST/TECHNOLOGIST

## 2024-06-21 RX ORDER — HYALURONATE SODIUM 10 MG/ML
20 SYRINGE (ML) INTRAARTICULAR
Status: COMPLETED | OUTPATIENT
Start: 2024-06-21 | End: 2024-06-21

## 2024-06-21 RX ADMIN — Medication 20 MG: at 10:45

## 2024-06-21 NOTE — PROGRESS NOTES
"1. Primary osteoarthritis of both knees  Large joint arthrocentesis    Large joint arthrocentesis          Patient is here for her second injection of Euflexxa into the bilateral knee.     Patient rates their pain as 3-4/10 today    Physical exam of the knee shows no effusion no ecchymosis.      Large joint arthrocentesis: R knee  Universal Protocol:  Consent given by: patient  Supporting Documentation  Indications: pain   Procedure Details  Location: knee - R knee  Needle size: 22 G  Ultrasound guidance: no  Approach: anterolateral  Medications administered: 20 mg Sodium Hyaluronate (Viscosup) 20 MG/2ML    Patient tolerance: patient tolerated the procedure well with no immediate complications      Large joint arthrocentesis: L knee  Universal Protocol:  Consent given by: patient  Time out: Immediately prior to procedure a \"time out\" was called to verify the correct patient, procedure, equipment, support staff and site/side marked as required.  Supporting Documentation  Indications: pain   Procedure Details  Location: knee - L knee  Needle size: 22 G  Ultrasound guidance: no  Approach: anterolateral  Medications administered: 20 mg Sodium Hyaluronate (Viscosup) 20 MG/2ML    Patient tolerance: patient tolerated the procedure well with no immediate complications          Patient tolerated procedure follow up as needed.  Patient also would like to come back in a month to get a cortisone injection to both knees because she is going to [place] and wants to have a prophylactic treatment as well.    Scribe Attestation      I,:  Radha Rizvi am acting as a scribe while in the presence of the attending physician.:       I,:  Medhat East DO personally performed the services described in this documentation    as scribed in my presence.:           "

## 2024-06-25 ENCOUNTER — TELEPHONE (OUTPATIENT)
Dept: PAIN MEDICINE | Facility: CLINIC | Age: 64
End: 2024-06-25

## 2024-06-25 NOTE — TELEPHONE ENCOUNTER
Spoke with patient advised of FQ recommendations.  Patient verbalized understanding and agreeable to plan.

## 2024-06-25 NOTE — TELEPHONE ENCOUNTER
Please let patient know I did review her x-rays of her hip/pelvis which does show moderate arthritis of both hips and also arthritis in the sacroiliac joints.  Continue with plan for sacroiliac joint injections as scheduled

## 2024-06-25 NOTE — TELEPHONE ENCOUNTER
Caller: Marina     Doctor: Tamie    Reason for call: Patient returning call from nurse please advise     Call back#: [phone]

## 2024-06-28 ENCOUNTER — DOCUMENTATION (OUTPATIENT)
Dept: FAMILY MEDICINE CLINIC | Facility: CLINIC | Age: 64
End: 2024-06-28

## 2024-06-28 ENCOUNTER — TELEPHONE (OUTPATIENT)
Age: 64
End: 2024-06-28

## 2024-06-28 ENCOUNTER — TELEPHONE (OUTPATIENT)
Dept: PAIN MEDICINE | Facility: CLINIC | Age: 64
End: 2024-06-28

## 2024-06-28 ENCOUNTER — TELEPHONE (OUTPATIENT)
Dept: FAMILY MEDICINE CLINIC | Facility: CLINIC | Age: 64
End: 2024-06-28

## 2024-06-28 DIAGNOSIS — N13.30 HYDRONEPHROSIS, LEFT: Primary | ICD-10-CM

## 2024-06-28 NOTE — TELEPHONE ENCOUNTER
Giovanni from Dr Jernigan office called with incidental findings of patients US of Kidney and bladder.  Dr Eason is recommending patient follow up with you for this.  What would you like to do at this time?

## 2024-06-28 NOTE — TELEPHONE ENCOUNTER
----- Message from Ivan Langley DO sent at 6/28/2024  7:01 AM EDT -----  US results: Left hydronephrosis and mild fullness of the right renal pelvis, of uncertain etiology. Bilateral ureteral jets visualized. Consider contrast CT evaluation with delayed imaging to differentiate parapelvic cysts.    Please call primary care physician, please forward results to primary care physician and have them follow-up with the patient    Please have patient understand the results and follow-up with primary care physician about further ordering as this was an incidental finding on lumbar MRI

## 2024-06-28 NOTE — TELEPHONE ENCOUNTER
S/w pt, advised of above. Pt verbalized understanding and appreciation. Will fu with PCP as discussed.

## 2024-06-28 NOTE — TELEPHONE ENCOUNTER
S/w phone agent and Dari at Chikis Tao's office. Advised of above and indicated the pt will be directed to Chikis Tao for fu re: 6/21/24 US results. Dari verbalized understanding. Stated that she will forward this info to Chikis Tao for review.

## 2024-07-01 NOTE — TELEPHONE ENCOUNTER
Spoke to patient. She is aware of stat test. Is currently out of town until Thursday. She will call central scheduling.

## 2024-07-05 ENCOUNTER — HOSPITAL ENCOUNTER (OUTPATIENT)
Dept: CT IMAGING | Facility: HOSPITAL | Age: 64
End: 2024-07-05
Payer: COMMERCIAL

## 2024-07-05 ENCOUNTER — APPOINTMENT (OUTPATIENT)
Dept: LAB | Facility: HOSPITAL | Age: 64
End: 2024-07-05
Payer: COMMERCIAL

## 2024-07-05 DIAGNOSIS — N13.30 HYDRONEPHROSIS, LEFT: ICD-10-CM

## 2024-07-05 LAB
ANION GAP SERPL CALCULATED.3IONS-SCNC: 8 MMOL/L (ref 4–13)
BUN SERPL-MCNC: 16 MG/DL (ref 5–25)
CALCIUM SERPL-MCNC: 9.3 MG/DL (ref 8.4–10.2)
CHLORIDE SERPL-SCNC: 105 MMOL/L (ref 96–108)
CO2 SERPL-SCNC: 27 MMOL/L (ref 21–32)
CREAT SERPL-MCNC: 0.78 MG/DL (ref 0.6–1.3)
GFR SERPL CREATININE-BSD FRML MDRD: 81 ML/MIN/1.73SQ M
GLUCOSE P FAST SERPL-MCNC: 87 MG/DL (ref 65–99)
POTASSIUM SERPL-SCNC: 3.9 MMOL/L (ref 3.5–5.3)
SODIUM SERPL-SCNC: 140 MMOL/L (ref 135–147)

## 2024-07-05 PROCEDURE — 36415 COLL VENOUS BLD VENIPUNCTURE: CPT

## 2024-07-05 PROCEDURE — 80048 BASIC METABOLIC PNL TOTAL CA: CPT

## 2024-07-05 PROCEDURE — 74177 CT ABD & PELVIS W/CONTRAST: CPT

## 2024-07-05 RX ADMIN — IOHEXOL 75 ML: 350 INJECTION, SOLUTION INTRAVENOUS at 13:37

## 2024-07-08 ENCOUNTER — TELEPHONE (OUTPATIENT)
Dept: FAMILY MEDICINE CLINIC | Facility: CLINIC | Age: 64
End: 2024-07-08

## 2024-07-08 NOTE — TELEPHONE ENCOUNTER
----- Message from EHLEN Holm sent at 7/7/2024  7:33 PM EDT -----  Please let patient know her CT scan shows kidney cysts bilaterally, no swollen kidneys, this is good news.   I do recommend she follow up with urology, non-urgent follow up regarding these cysts. Kidney cysts are benign and do not cause any problems in most cases, but sometimes they cause problems and require treatment, they often require surveillance to be sure they are stable.   Please let me know if you have any questions.

## 2024-07-17 ENCOUNTER — OFFICE VISIT (OUTPATIENT)
Dept: FAMILY MEDICINE CLINIC | Facility: CLINIC | Age: 64
End: 2024-07-17
Payer: COMMERCIAL

## 2024-07-17 VITALS
HEIGHT: 70 IN | TEMPERATURE: 97.6 F | DIASTOLIC BLOOD PRESSURE: 84 MMHG | BODY MASS INDEX: 36.88 KG/M2 | RESPIRATION RATE: 18 BRPM | SYSTOLIC BLOOD PRESSURE: 132 MMHG | WEIGHT: 257.6 LBS

## 2024-07-17 DIAGNOSIS — H00.014 HORDEOLUM EXTERNUM OF LEFT UPPER EYELID: Primary | ICD-10-CM

## 2024-07-17 PROCEDURE — 99213 OFFICE O/P EST LOW 20 MIN: CPT | Performed by: FAMILY MEDICINE

## 2024-07-17 RX ORDER — OFLOXACIN 3 MG/ML
1 SOLUTION/ DROPS OPHTHALMIC 4 TIMES DAILY
Qty: 5 ML | Refills: 0 | Status: CANCELLED | OUTPATIENT
Start: 2024-07-17

## 2024-07-17 RX ORDER — OFLOXACIN 3 MG/ML
1 SOLUTION/ DROPS OPHTHALMIC 4 TIMES DAILY
Qty: 5 ML | Refills: 0 | Status: SHIPPED | OUTPATIENT
Start: 2024-07-17

## 2024-07-17 NOTE — PROGRESS NOTES
"Ambulatory Visit  Name: Marina Matos      : 1960      MRN: [id number]  Encounter Provider: Tony Miller DO  Encounter Date: 2024   Encounter department: [facility]    Assessment & Plan   1. Hordeolum externum of left upper eyelid  -     ofloxacin (OCUFLOX) 0.3 % ophthalmic solution; Administer 1 drop to both eyes 4 (four) times a day     History of Present Illness     HPI  Left upper lid itchy, stye for three weeks. Has been using warm compresses but no abx. Allergic to neosporin.      Review of Systems    Objective     /84   Temp 97.6 °F (36.4 °C) (Temporal)   Resp 18   Ht 5' 10\" (1.778 m)   Wt 117 kg (257 lb 9.6 oz)   BMI 36.96 kg/m²     Physical Exam  Left upper lid stye  Administrative Statements           "

## 2024-07-19 ENCOUNTER — OFFICE VISIT (OUTPATIENT)
Dept: OBGYN CLINIC | Facility: CLINIC | Age: 64
End: 2024-07-19
Payer: COMMERCIAL

## 2024-07-19 VITALS
HEART RATE: 78 BPM | BODY MASS INDEX: 36.79 KG/M2 | DIASTOLIC BLOOD PRESSURE: 80 MMHG | WEIGHT: 257 LBS | HEIGHT: 70 IN | SYSTOLIC BLOOD PRESSURE: 130 MMHG

## 2024-07-19 DIAGNOSIS — M17.0 OSTEOARTHRITIS OF PATELLOFEMORAL JOINTS OF BOTH KNEES: ICD-10-CM

## 2024-07-19 DIAGNOSIS — M25.562 CHRONIC PAIN OF BOTH KNEES: ICD-10-CM

## 2024-07-19 DIAGNOSIS — G89.29 CHRONIC PAIN OF BOTH KNEES: ICD-10-CM

## 2024-07-19 DIAGNOSIS — M25.561 CHRONIC PAIN OF BOTH KNEES: ICD-10-CM

## 2024-07-19 DIAGNOSIS — M17.0 PRIMARY OSTEOARTHRITIS OF BOTH KNEES: Primary | ICD-10-CM

## 2024-07-19 PROCEDURE — 20610 DRAIN/INJ JOINT/BURSA W/O US: CPT | Performed by: SPECIALIST/TECHNOLOGIST

## 2024-07-19 PROCEDURE — 99212 OFFICE O/P EST SF 10 MIN: CPT | Performed by: ORTHOPAEDIC SURGERY

## 2024-07-19 RX ORDER — BUPIVACAINE HYDROCHLORIDE 5 MG/ML
2 INJECTION, SOLUTION EPIDURAL; INTRACAUDAL
Status: COMPLETED | OUTPATIENT
Start: 2024-07-19 | End: 2024-07-19

## 2024-07-19 RX ORDER — BETAMETHASONE SODIUM PHOSPHATE AND BETAMETHASONE ACETATE 3; 3 MG/ML; MG/ML
12 INJECTION, SUSPENSION INTRA-ARTICULAR; INTRALESIONAL; INTRAMUSCULAR; SOFT TISSUE
Status: COMPLETED | OUTPATIENT
Start: 2024-07-19 | End: 2024-07-19

## 2024-07-19 RX ADMIN — BETAMETHASONE SODIUM PHOSPHATE AND BETAMETHASONE ACETATE 12 MG: 3; 3 INJECTION, SUSPENSION INTRA-ARTICULAR; INTRALESIONAL; INTRAMUSCULAR; SOFT TISSUE at 10:30

## 2024-07-19 RX ADMIN — BUPIVACAINE HYDROCHLORIDE 2 ML: 5 INJECTION, SOLUTION EPIDURAL; INTRACAUDAL at 10:30

## 2024-07-19 NOTE — PROGRESS NOTES
Assessment:  1. Primary osteoarthritis of both knees  Large joint arthrocentesis: bilateral knee      2. Chronic pain of both knees  Large joint arthrocentesis: bilateral knee      3. Osteoarthritis of patellofemoral joints of both knees  Large joint arthrocentesis: bilateral knee          Patient Active Problem List   Diagnosis    Mixed hyperlipidemia    Bradycardia    Dense breast tissue    Dysfunctional uterine bleeding    Endometrial hyperplasia    GERD (gastroesophageal reflux disease)    Post-menopausal bleeding    Obesity    IFG (impaired fasting glucose)    Preop cardiovascular exam    Paraesophageal hernia    Right calf pain    Primary osteoarthritis of both knees           Plan      63 y.o. female with chronic bilateral knee pain due to known underlying osteoarthritis  Diagnostics reviewed and physical exam performed.  Diagnosis, treatment options and associated risks were discussed with the patient including no treatment, nonsurgical treatment and potential for surgical intervention.  The patient was given the opportunity to ask questions regarding each.   Patient was offered, accepted, and received a cortisone injection of the bilateral knee today. Patient tolerated procedure well with no immediate complications. Post-injection protocols and expectations were discussed with the patient.   Patient may resume work and recreational activities as tolerated  Apply Voltaren gel to painful area up to 4 times a day  May use ice or heat as needed for pain relief  May take NSAIDs/Tylenol as needed for pain control  Follow up on an as needed basis    Subjective:     Patient ID:    Chief Complaint:Marina Krausriman 63 y.o. female      HPI    Presents for follow up evaluation of bilateral knee pain, right worse than left.  Denies any injury or trauma to either knee. Pain is daily, intermittent, ache that increases in severity with activity and weight bearing. Pain and crepitance with stairs. She notes difficulty  getting off the floor due to pain and stiffness. Intermittent buckling to the right knee. She has attempted to manage her pain with ibuprofen and Voltaren with mild benefit. Denies numbness or paresthesias. She has found relief in the past from cortisone injection last provided on 3/28/2024 and VISCO series completed 6/21/2024. Pain is rated 5/10 on the right and 3/10 on the left today.     The following portions of the patient's history were reviewed and updated as appropriate: allergies, current medications, past family history, past social history, past surgical history and problem list.        Social History     Socioeconomic History    Marital status: /Civil Union     Spouse name: Not on file    Number of children: Not on file    Years of education: Not on file    Highest education level: Not on file   Occupational History    Not on file   Tobacco Use    Smoking status: Never     Passive exposure: Past    Smokeless tobacco: Never   Vaping Use    Vaping status: Never Used   Substance and Sexual Activity    Alcohol use: Yes     Comment: 1 x per week    Drug use: Never    Sexual activity: Yes     Partners: Male   Other Topics Concern    Not on file   Social History Narrative    Not on file     Social Determinants of Health     Financial Resource Strain: Not on file   Food Insecurity: Not on file   Transportation Needs: Not on file   Physical Activity: Not on file   Stress: Not on file (2/7/2021)   Social Connections: Not on file   Intimate Partner Violence: Low Risk  (4/15/2021)    Received from [employer]    Intimate Partner Violence     Insults You: Not on file     Threatens You: Not on file     Screams at You: Not on file     Physically Hurt: Not on file     Intimate Partner Violence Score: Not on file   Housing Stability: Not on file     Past Medical History:   Diagnosis Date    Back pain     Bradycardia     Colon polyp     DDD (degenerative disc disease), lumbar     DJD  (degenerative joint disease)     Female infertility     GERD (gastroesophageal reflux disease)     History of COVID-19 12/2021    mild cold s/s    History of transfusion     Hypercholesterolemia     Obesity     Paraesophageal hernia     Uveitis     Vertigo      Past Surgical History:   Procedure Laterality Date    CATARACT EXTRACTION, BILATERAL      COLONOSCOPY      2021    DILATION AND CURETTAGE OF UTERUS      EGD      HYSTEROSCOPY  01/09/2012    MYOMECTOMY      SC LAPS RPR PARAESPHGL HRNA INCL FUNDPLSTY W/MESH N/A 5/22/2023    Procedure: PARAESOPHAGEAL HERNIA REPAIR W/ ROBOT AND TIF;  Surgeon: Shankar Gill MD;  Location: [facility];  Service: Bariatrics    TONSILLECTOMY AND ADENOIDECTOMY       Allergies   Allergen Reactions    Neomycin-Bacitracin Zn-Polymyx Rash     Current Outpatient Medications on File Prior to Visit   Medication Sig Dispense Refill    atorvastatin (LIPITOR) 20 mg tablet TAKE 1 TABLET BY MOUTH EVERY DAY 90 tablet 3    Euflexxa 20 MG/2ML SOSY       gabapentin (NEURONTIN) 100 mg capsule Take 1 tablet at bedtime.  May increase to 2 tablets after 3 days 60 capsule 1    glucosamine-chondroitin 500-400 MG tablet Take 1 tablet by mouth daily      Multiple Vitamins-Minerals (MULTIVITAMIN WITH MINERALS) tablet Take 1 tablet by mouth daily      ofloxacin (OCUFLOX) 0.3 % ophthalmic solution Administer 1 drop to both eyes 4 (four) times a day 5 mL 0    Vitamin E 134 MG (200 UNIT) capsule Take by mouth daily       No current facility-administered medications on file prior to visit.              Objective:    Review of Systems   Constitutional:  Negative for chills and fever.   HENT:  Negative for ear pain and sore throat.    Eyes:  Negative for pain and visual disturbance.   Respiratory:  Negative for cough and shortness of breath.    Cardiovascular:  Negative for chest pain and palpitations.   Gastrointestinal:  Negative for abdominal pain and vomiting.   Genitourinary:  Negative for dysuria and  hematuria.   Musculoskeletal:  Negative for arthralgias and back pain.   Skin:  Negative for color change and rash.   Neurological:  Negative for seizures and syncope.   All other systems reviewed and are negative.      Right Knee Exam     Range of Motion   Extension:  0   Right knee flexion: 105.     Tests   Varus: negative Valgus: negative  Drawer:  Anterior - negative    Posterior - negative    Other   Erythema: absent  Sensation: normal  Pulse: present  Swelling: none  Effusion: no effusion present    Comments:  Palpable bakers cyst   Skin is warm and dry to touch with no signs of erythema, ecchymosis, or infection   Edema noted at hoffa's fat pad  Flexor and extensor mechanisms are intact   Knee is stable to varus and valgus stress  Patella tracks centrally with palpable and audible crepitus  Calf compartments are soft and supple  2+ DP and PT pulses with brisk capillary refill to the toes  Sural, saphenous, tibial, superficial, and deep peroneal motor and sensory distributions intact  Sensation light touch intact distally        Left Knee Exam     Range of Motion   Extension:  0   Flexion:  110     Tests   Varus: negative Valgus: negative  Drawer:  Anterior - negative     Posterior - negative    Other   Erythema: absent  Sensation: normal  Pulse: present  Swelling: none  Effusion: no effusion present    Comments:  Palpable bakers cyst, smaller then contralateral side   Skin is warm and dry to touch with no signs of erythema, ecchymosis, or infection   Edema noted at hoffa's fat pad  Flexor and extensor mechanisms are intact   Knee is stable to varus and valgus stress  Patella tracks centrally with palpable and audible crepitus  Calf compartments are soft and supple  2+ DP and PT pulses with brisk capillary refill to the toes  Sural, saphenous, tibial, superficial, and deep peroneal motor and sensory distributions intact  Sensation light touch intact distally              Physical Exam  Vitals and nursing note  reviewed.   HENT:      Head: Normocephalic.   Eyes:      Extraocular Movements: Extraocular movements intact.   Cardiovascular:      Rate and Rhythm: Normal rate.      Pulses: Normal pulses.   Pulmonary:      Effort: Pulmonary effort is normal.   Musculoskeletal:         General: Normal range of motion.      Cervical back: Normal range of motion.      Right knee: No effusion.      Left knee: No effusion.      Comments: See ortho exam   Skin:     General: Skin is warm and dry.   Neurological:      General: No focal deficit present.      Mental Status: She is alert.   Psychiatric:         Behavior: Behavior normal.         Large joint arthrocentesis: bilateral knee  Universal Protocol:  Consent: Verbal consent obtained.  Risks and benefits: risks, benefits and alternatives were discussed  Consent given by: patient  Timeout called at: 3/28/2024 8:14 AM.  Patient understanding: patient states understanding of the procedure being performed  Patient identity confirmed: verbally with patient  Supporting Documentation  Indications: pain and diagnostic evaluation   Procedure Details  Location: knee - bilateral knee  Preparation: Patient was prepped and draped in the usual sterile fashion  Needle size: 22 G  Ultrasound guidance: no    Medications (Right): 2 mL bupivacaine (PF) 0.5 %; 12 mg betamethasone acetate-betamethasone sodium phosphate 6 (3-3) mg/mLMedications (Left): 2 mL bupivacaine (PF) 0.5 %; 12 mg betamethasone acetate-betamethasone sodium phosphate 6 (3-3) mg/mL   Patient tolerance: patient tolerated the procedure well with no immediate complications  Dressing:  Sterile dressing applied            I have personally reviewed pertinent films in PACS.    X-rays of the left knee obtained today demonstrates moderate tibiofemoral osteoarthritis and severe patellofemoral osteoarthritis. Patella freedom noted.    X-ray of the right knee obtained 3/14/2024 reviewed demonstrates severe patellofemoral degenerative changes with  "joint space loss, subchondral sclerosis and osteophytosis.     Scribe Attestation      I,:  Jossy Mccollum am acting as a scribe while in the presence of the attending physician.:       I,:  Medhat [place], DO personally performed the services described in this documentation    as scribed in my presence.:               Portions of the record may have been created with voice recognition software.  Occasional wrong word or \"sound a like\" substitutions may have occurred due to the inherent limitations of voice recognition software.  Read the chart carefully and recognize, using context, where substitutions have occurred.    "

## 2024-08-09 ENCOUNTER — NURSE TRIAGE (OUTPATIENT)
Age: 64
End: 2024-08-09

## 2024-08-09 NOTE — TELEPHONE ENCOUNTER
"Patient called office stating she is experiencing vaginal bleeding post menopause that started about 5 days ago. The bleeding is bright red and it is spotting. Her LMP was 9 years ago. She denies abdominal pain or any other symptoms at this time. Attempted to schedule patient appointment , warm transfer to [facility] at office.   Advised patient to call back with worsening, symptoms or increase in bleeding, abdominal pain. She verbalized understanding.           Reason for Disposition   Postmenopausal vaginal bleeding    Answer Assessment - Initial Assessment Questions  1. AMOUNT: \"Describe the bleeding that you are having.\" \"How much bleeding is there?\"     - SPOTTING: spotting, or pinkish / brownish mucous discharge; does not fill panti-liner or pad     - MILD:  less than 1 pad / hour; less than patient's usual menstrual bleeding    - MODERATE: 1-2 pads / hour; 1 menstrual cup every 6 hours; small-medium blood clots (e.g., pea, grape, small coin)    - SEVERE: soaking 2 or more pads/hour for 2 or more hours; 1 menstrual cup every 2 hours; bleeding not contained by pads or continuous red blood from vagina; large blood clots (e.g., golf ball, large coin)       Red blood - spotting   2. ONSET: \"When did the bleeding begin?\" \"Is it continuing now?\"      5 days ago - continuing   3. MENOPAUSE: \"When was your last menstrual period?\"       9 years ago   4. ABDOMINAL PAIN: \"Do you have any pain?\" \"How bad is the pain?\"  (e.g., Scale 1-10; mild, moderate, or severe)    - MILD (1-3): doesn't interfere with normal activities, abdomen soft and not tender to touch     - MODERATE (4-7): interferes with normal activities or awakens from sleep, tender to touch     - SEVERE (8-10): excruciating pain, doubled over, unable to do any normal activities       No   5. BLOOD THINNERS: \"Do you take any blood thinners?\" (e.g., Coumadin/warfarin, Pradaxa/dabigatran, aspirin)      No   6. HORMONES: \"Are you taking any hormone medications, " "prescription or OTC?\" (e.g., birth control pills, estrogen)      No  7. CAUSE: \"What do you think is causing the bleeding?\" (e.g., recent gyn surgery, recent gyn procedure; known bleeding disorder, uterine cancer)        Unknown   8. HEMODYNAMIC STATUS: \"Are you weak or feeling lightheaded?\" If Yes, ask: \"Can you stand and walk normally?\"        No  9. OTHER SYMPTOMS: \"What other symptoms are you having with the bleeding?\" (e.g., back pain, burning with urination, fever)      No    Protocols used: Vaginal Bleeding - Postmenopausal-ADULT-AH    "

## 2024-08-15 ENCOUNTER — OFFICE VISIT (OUTPATIENT)
Dept: GYNECOLOGY | Facility: CLINIC | Age: 64
End: 2024-08-15
Payer: COMMERCIAL

## 2024-08-15 VITALS
HEART RATE: 68 BPM | HEIGHT: 70 IN | WEIGHT: 246 LBS | BODY MASS INDEX: 35.22 KG/M2 | DIASTOLIC BLOOD PRESSURE: 86 MMHG | SYSTOLIC BLOOD PRESSURE: 142 MMHG

## 2024-08-15 DIAGNOSIS — Z01.419 ENCOUNTER FOR GYNECOLOGICAL EXAMINATION WITH PAPANICOLAOU SMEAR OF CERVIX: Primary | ICD-10-CM

## 2024-08-15 DIAGNOSIS — N95.0 PMB (POSTMENOPAUSAL BLEEDING): ICD-10-CM

## 2024-08-15 PROCEDURE — 99203 OFFICE O/P NEW LOW 30 MIN: CPT | Performed by: OBSTETRICS & GYNECOLOGY

## 2024-08-15 PROCEDURE — 88305 TISSUE EXAM BY PATHOLOGIST: CPT | Performed by: SPECIALIST

## 2024-08-15 PROCEDURE — G0145 SCR C/V CYTO,THINLAYER,RESCR: HCPCS | Performed by: OBSTETRICS & GYNECOLOGY

## 2024-08-15 PROCEDURE — 58100 BIOPSY OF UTERUS LINING: CPT | Performed by: OBSTETRICS & GYNECOLOGY

## 2024-08-15 NOTE — PROGRESS NOTES
Ambulatory Visit  Name: Marina Matos      : 1960      MRN: [id number]  Encounter Provider: Adam Miranda DO  Encounter Date: 8/15/2024   Encounter department: [facility]    Assessment & Plan   1. Encounter for gynecological examination with Papanicolaou smear of cervix  -     Liquid-based pap, screening  2. PMB (postmenopausal bleeding)  -     Tissue Exam      History of Present Illness     Marina Matos is a 63 y.o. female new/returning patient who presents complaining of vaginal spotting starting on .  She has no pain with this.  She denies any vaginal irritation burning or discharge.  Denies any abdominal pain or fever.  Denies any dysuria, hematuria urgency or urinary incontinence.  She was last seen in the office in 2020.  Patient has recently received steroid injections.    Colonoscopy 2023.  Polyps identified.  Advised repeat in 3 years    Review of Systems   Constitutional: Negative.    Gastrointestinal: Negative.    Genitourinary:  Positive for vaginal bleeding.     Past Medical History   Past Medical History:   Diagnosis Date    Back pain     Bradycardia     Colon polyp     DDD (degenerative disc disease), lumbar     DJD (degenerative joint disease)     Female infertility     Fibroid     GERD (gastroesophageal reflux disease)     History of COVID-19 2021    mild cold s/s    History of transfusion     Hypercholesterolemia     Menopause ovarian failure     Miscarriage     had IVF treatment which failed    Obesity     Paraesophageal hernia     Postmenopausal bleeding Aug 2, 2024    intermitent spotting    Uveitis     Vertigo      Past Surgical History:   Procedure Laterality Date    CATARACT EXTRACTION, BILATERAL      COLONOSCOPY          DILATION AND CURETTAGE OF UTERUS      EGD      HYSTEROSCOPY  2012    MYOMECTOMY      WI LAPS RPR PARAESPHGL HRNA INCL FUNDPLSTY W/MESH N/A 2023    Procedure:  PARAESOPHAGEAL HERNIA REPAIR W/ ROBOT AND TIF;  Surgeon: Shankar Gill MD;  Location: [facility];  Service: Bariatrics    TONSILLECTOMY AND ADENOIDECTOMY       Family History   Problem Relation Age of Onset    Hypertension Mother     Osteoarthritis Mother     Heart murmur Father     Heart disease Father         aortic aneurysm    Diabetes Sister     No Known Problems Sister     No Known Problems Maternal Aunt     No Known Problems Maternal Aunt     Breast cancer Paternal Aunt 50    No Known Problems Maternal Grandmother     No Known Problems Maternal Grandfather     No Known Problems Paternal Grandmother     Heart disease Paternal Grandfather         aortic aneurysm    Esophageal cancer Cousin 60    Hyperlipidemia Family     Heart disease Sister         aortic aneurysm     Current Outpatient Medications on File Prior to Visit   Medication Sig Dispense Refill    atorvastatin (LIPITOR) 20 mg tablet TAKE 1 TABLET BY MOUTH EVERY DAY 90 tablet 3    Euflexxa 20 MG/2ML SOSY       gabapentin (NEURONTIN) 100 mg capsule Take 1 tablet at bedtime.  May increase to 2 tablets after 3 days 60 capsule 1    glucosamine-chondroitin 500-400 MG tablet Take 1 tablet by mouth daily      Multiple Vitamins-Minerals (MULTIVITAMIN WITH MINERALS) tablet Take 1 tablet by mouth daily      ofloxacin (OCUFLOX) 0.3 % ophthalmic solution Administer 1 drop to both eyes 4 (four) times a day 5 mL 0    Vitamin E 134 MG (200 UNIT) capsule Take by mouth daily       No current facility-administered medications on file prior to visit.     Allergies   Allergen Reactions    Neomycin-Bacitracin Zn-Polymyx Rash      Current Outpatient Medications on File Prior to Visit   Medication Sig Dispense Refill    atorvastatin (LIPITOR) 20 mg tablet TAKE 1 TABLET BY MOUTH EVERY DAY 90 tablet 3    Euflexxa 20 MG/2ML SOSY       gabapentin (NEURONTIN) 100 mg capsule Take 1 tablet at bedtime.  May increase to 2 tablets after 3 days 60 capsule 1    glucosamine-chondroitin  "500-400 MG tablet Take 1 tablet by mouth daily      Multiple Vitamins-Minerals (MULTIVITAMIN WITH MINERALS) tablet Take 1 tablet by mouth daily      ofloxacin (OCUFLOX) 0.3 % ophthalmic solution Administer 1 drop to both eyes 4 (four) times a day 5 mL 0    Vitamin E 134 MG (200 UNIT) capsule Take by mouth daily       No current facility-administered medications on file prior to visit.      Social History     Tobacco Use    Smoking status: Never     Passive exposure: Past    Smokeless tobacco: Never   Vaping Use    Vaping status: Never Used   Substance and Sexual Activity    Alcohol use: Yes     Alcohol/week: 1.0 standard drink of alcohol     Types: 1 Glasses of wine per week     Comment: 1 x per week    Drug use: Never    Sexual activity: Yes     Partners: Male     Birth control/protection: Post-menopausal     Comment: single partner-     Objective     /86   Pulse 68   Ht 5' 10\" (1.778 m)   Wt 112 kg (246 lb)   BMI 35.30 kg/m²     Physical Exam  Vitals reviewed.   Constitutional:       Appearance: Normal appearance.   Abdominal:      General: There is no distension.      Palpations: Abdomen is soft. There is no mass.      Tenderness: There is no abdominal tenderness. There is no guarding or rebound.      Hernia: No hernia is present. There is no hernia in the left inguinal area or right inguinal area.   Genitourinary:     General: Normal vulva.      Labia:         Right: No rash, tenderness or lesion.         Left: No rash, tenderness or lesion.       Vagina: Normal.      Cervix: Normal.      Uterus: Normal.       Adnexa:         Right: No mass, tenderness or fullness.          Left: No mass, tenderness or fullness.     Lymphadenopathy:      Lower Body: No right inguinal adenopathy. No left inguinal adenopathy.   Neurological:      Mental Status: She is alert.     Endometrial biopsy    Date/Time: 8/15/2024 10:30 AM    Performed by: Adam Miarnda DO  Authorized by: Adam Miranda DO  " Universal Protocol:  Consent: Verbal consent obtained.  Consent given by: patient  Patient understanding: patient states understanding of the procedure being performed  Patient identity confirmed: verbally with patient    Indication:     Indications: Post-menopausal bleeding      Chronicity of post-menopausal bleeding:  New  Procedure:     Procedure: endometrial biopsy with Pipelle      A bivalve speculum was placed in the vagina: yes      Cervix cleaned and prepped: yes      Uterus sounded: yes      Uterus sound depth (cm):  7    Specimen collected: specimen collected and sent to pathology      Patient tolerated procedure well with no complications: yes    Findings:     Uterus size:  Non-gravid    Cervix: normal      Adnexa: normal       Administrative Statements

## 2024-08-16 ENCOUNTER — HOSPITAL ENCOUNTER (OUTPATIENT)
Dept: RADIOLOGY | Facility: CLINIC | Age: 64
End: 2024-08-16
Payer: COMMERCIAL

## 2024-08-16 VITALS
HEART RATE: 59 BPM | TEMPERATURE: 97 F | DIASTOLIC BLOOD PRESSURE: 84 MMHG | OXYGEN SATURATION: 94 % | RESPIRATION RATE: 18 BRPM | SYSTOLIC BLOOD PRESSURE: 131 MMHG

## 2024-08-16 DIAGNOSIS — M46.1 SACROILIITIS (HCC): ICD-10-CM

## 2024-08-16 PROCEDURE — 27096 INJECT SACROILIAC JOINT: CPT | Performed by: ANESTHESIOLOGY

## 2024-08-16 RX ORDER — ROPIVACAINE HYDROCHLORIDE 2 MG/ML
7 INJECTION, SOLUTION EPIDURAL; INFILTRATION; PERINEURAL ONCE
Status: COMPLETED | OUTPATIENT
Start: 2024-08-16 | End: 2024-08-16

## 2024-08-16 RX ORDER — 0.9 % SODIUM CHLORIDE 0.9 %
4 VIAL (ML) INJECTION ONCE
Status: COMPLETED | OUTPATIENT
Start: 2024-08-16 | End: 2024-08-16

## 2024-08-16 RX ORDER — METHYLPREDNISOLONE ACETATE 80 MG/ML
80 INJECTION, SUSPENSION INTRA-ARTICULAR; INTRALESIONAL; INTRAMUSCULAR; PARENTERAL; SOFT TISSUE ONCE
Status: COMPLETED | OUTPATIENT
Start: 2024-08-16 | End: 2024-08-16

## 2024-08-16 RX ADMIN — Medication 4 ML: at 14:20

## 2024-08-16 RX ADMIN — ROPIVACAINE HYDROCHLORIDE 7 ML: 2 INJECTION, SOLUTION EPIDURAL; INFILTRATION; PERINEURAL at 14:20

## 2024-08-16 RX ADMIN — IOHEXOL 2 ML: 300 INJECTION, SOLUTION INTRAVENOUS at 14:20

## 2024-08-16 RX ADMIN — METHYLPREDNISOLONE ACETATE 80 MG: 80 INJECTION, SUSPENSION INTRA-ARTICULAR; INTRALESIONAL; INTRAMUSCULAR; PARENTERAL; SOFT TISSUE at 14:20

## 2024-08-16 NOTE — DISCHARGE INSTR - LAB

## 2024-08-16 NOTE — H&P
History of Present Illness: The patient is a 63 y.o. female who presents with complaints of lower back pain and is here today for bilateral sacroiliac joint injections    Past Medical History:   Diagnosis Date    Back pain     Bradycardia     Colon polyp     DDD (degenerative disc disease), lumbar     DJD (degenerative joint disease)     Female infertility     Fibroid 1997    GERD (gastroesophageal reflux disease)     History of COVID-19 12/2021    mild cold s/s    History of transfusion     Hypercholesterolemia     Menopause ovarian failure 2013    Miscarriage 1998    had IVF treatment which failed    Obesity     Paraesophageal hernia     Postmenopausal bleeding Aug 2, 2024    intermitent spotting    Uveitis     Vertigo        Past Surgical History:   Procedure Laterality Date    CATARACT EXTRACTION, BILATERAL      COLONOSCOPY      2021    DILATION AND CURETTAGE OF UTERUS      EGD      HYSTEROSCOPY  01/09/2012    MYOMECTOMY      MA LAPS RPR PARAESPHGL HRNA INCL FUNDPLSTY W/MESH N/A 5/22/2023    Procedure: PARAESOPHAGEAL HERNIA REPAIR W/ ROBOT AND TIF;  Surgeon: Shankar Gill MD;  Location: [facility];  Service: Bariatrics    TONSILLECTOMY AND ADENOIDECTOMY           Current Outpatient Medications:     atorvastatin (LIPITOR) 20 mg tablet, TAKE 1 TABLET BY MOUTH EVERY DAY, Disp: 90 tablet, Rfl: 3    Euflexxa 20 MG/2ML SOSY, , Disp: , Rfl:     gabapentin (NEURONTIN) 100 mg capsule, Take 1 tablet at bedtime.  May increase to 2 tablets after 3 days, Disp: 60 capsule, Rfl: 1    glucosamine-chondroitin 500-400 MG tablet, Take 1 tablet by mouth daily, Disp: , Rfl:     Multiple Vitamins-Minerals (MULTIVITAMIN WITH MINERALS) tablet, Take 1 tablet by mouth daily, Disp: , Rfl:     ofloxacin (OCUFLOX) 0.3 % ophthalmic solution, Administer 1 drop to both eyes 4 (four) times a day, Disp: 5 mL, Rfl: 0    Vitamin E 134 MG (200 UNIT) capsule, Take by mouth daily, Disp: , Rfl:     Current Facility-Administered Medications:      iohexol (OMNIPAQUE) 300 mg/mL injection 2 mL, 2 mL, Intra-articular, Once, Oniel Willett MD    lidocaine (PF) (XYLOCAINE-MPF) 2 % injection 4 mL, 4 mL, Infiltration, Once, Oniel Willett MD    methylPREDNISolone acetate (DEPO-MEDROL) injection 80 mg, 80 mg, Intra-articular, Once, Oniel Willett MD    ropivacaine (NAROPIN) injection 7 mL, 7 mL, Intra-articular, Once, Oniel Willett MD    sodium chloride (PF) 0.9 % injection 4 mL, 4 mL, Infiltration, Once, Oniel Willett MD    Allergies   Allergen Reactions    Neomycin-Bacitracin Zn-Polymyx Rash       Physical Exam:   Vitals:    08/16/24 1403   BP: 130/83   Pulse: 69   Resp: 18   Temp: (!) 97 °F (36.1 °C)   SpO2: 97%     General: Awake, Alert, Oriented x 3, Mood and affect appropriate  Respiratory: Respirations even and unlabored  Cardiovascular: Peripheral pulses intact; no edema  Musculoskeletal Exam: Lower back pain    ASA Score: 3    Patient/Chart Verification  Patient ID Verified: Verbal  ID Band Applied: No  Consents Confirmed: To be obtained in the Pre-Procedure area, Procedural  H&P( within 30 days) Verified: To be obtained in the Pre-Procedure area  Allergies Reviewed: Yes  Anticoag/NSAID held?: No  Currently on antibiotics?: No    Assessment:   1. Sacroiliitis (HCC)        Plan: Bilateral SI joint injections

## 2024-08-21 LAB
LAB AP GYN PRIMARY INTERPRETATION: NORMAL
Lab: NORMAL

## 2024-08-21 PROCEDURE — 88305 TISSUE EXAM BY PATHOLOGIST: CPT | Performed by: SPECIALIST

## 2024-08-22 ENCOUNTER — TELEPHONE (OUTPATIENT)
Dept: OBGYN CLINIC | Facility: CLINIC | Age: 64
End: 2024-08-22

## 2024-08-22 NOTE — TELEPHONE ENCOUNTER
----- Message from Adam Miranda DO sent at 8/21/2024 11:25 PM EDT -----  Regarding: FW:  SHANKAR RAMOSS SIS  ----- Message -----  From: Lab, Background User  Sent: 8/21/2024   1:18 PM EDT  To: Adam Miranda DO

## 2024-08-27 ENCOUNTER — OFFICE VISIT (OUTPATIENT)
Dept: CARDIOLOGY CLINIC | Facility: CLINIC | Age: 64
End: 2024-08-27
Payer: COMMERCIAL

## 2024-08-27 VITALS
HEIGHT: 70 IN | HEART RATE: 65 BPM | SYSTOLIC BLOOD PRESSURE: 138 MMHG | OXYGEN SATURATION: 97 % | WEIGHT: 246 LBS | DIASTOLIC BLOOD PRESSURE: 90 MMHG | BODY MASS INDEX: 35.22 KG/M2

## 2024-08-27 DIAGNOSIS — R01.1 SYSTOLIC MURMUR: ICD-10-CM

## 2024-08-27 DIAGNOSIS — Z82.49 FAMILY HISTORY OF AORTIC ANEURYSM: ICD-10-CM

## 2024-08-27 DIAGNOSIS — E78.2 MIXED HYPERLIPIDEMIA: Primary | ICD-10-CM

## 2024-08-27 PROCEDURE — 99213 OFFICE O/P EST LOW 20 MIN: CPT | Performed by: INTERNAL MEDICINE

## 2024-08-27 NOTE — ASSESSMENT & PLAN NOTE
The patient became aware of several members of her family having had aortic aneurysm and she is concerned about it I will therefore arrange for the patient to have CT scan of the chest.  I will also make arrangement for echocardiogram.

## 2024-08-27 NOTE — PATIENT INSTRUCTIONS
The patient will be scheduled for CT scan of the chest and echocardiogram.  She will continue present medications.

## 2024-08-27 NOTE — ASSESSMENT & PLAN NOTE
The patient has a systolic murmur in the aortic position.  This may represent aortic valve sclerosis.  An echocardiogram is in order.

## 2024-08-27 NOTE — PROGRESS NOTES
Subjective:        Patient ID: Marina Matos is a 63 y.o. female.    Chief Complaint:  The patient presented to this office for the purpose of cardiac follow-up and reevaluation.  The patient has been feeling well denying any symptom of chest pain or shortness of breath.  No symptoms of palpitation, dizziness or syncope.  She has no leg edema.  The patient is concerned because her sister was diagnosed with thoracic aneurysm.  Upon further exploration of the family history, there has been several instances of relatives with cardiac problems that were also diagnosed with aortic aneurysm.  As mentioned above the patient is totally asymptomatic but is concerned about the possibility that she may have the same.      The following portions of the patient's history were reviewed and updated as appropriate: allergies, current medications, past family history, past medical history, past social history, past surgical history, and problem list.  Review of Systems   Constitutional: Negative.   Cardiovascular: Negative.    Respiratory: Negative.     Psychiatric/Behavioral: Negative.            Objective:     Physical Exam  Constitutional:       Appearance: Normal appearance.   HENT:      Head: Normocephalic and atraumatic.   Cardiovascular:      Rate and Rhythm: Normal rate and regular rhythm.      Heart sounds: Murmur heard.      Systolic murmur is present with a grade of 2/6.   Pulmonary:      Effort: Pulmonary effort is normal.      Breath sounds: Normal breath sounds.   Musculoskeletal:      Right lower leg: No edema.      Left lower leg: No edema.   Skin:     General: Skin is warm and dry.   Neurological:      Mental Status: She is alert and oriented to person, place, and time.   Psychiatric:         Mood and Affect: Mood normal.         Behavior: Behavior normal.         Lab Review:   Lab Results   Component Value Date    K 3.9 07/05/2024    K 4.9 09/10/2022     07/05/2024     09/10/2022    CO2 27  07/05/2024    CO2 31 09/10/2022    BUN 16 07/05/2024    BUN 18 09/10/2022    CREATININE 0.78 07/05/2024    CALCIUM 9.3 07/05/2024    CALCIUM 9.9 09/10/2022         Assessment:       1. Mixed hyperlipidemia        2. Family history of aortic aneurysm  Echo complete w/ contrast if indicated    CT chest wo contrast      3. Systolic murmur  Echo complete w/ contrast if indicated    CT chest wo contrast           Plan:       The patient will be scheduled for CT scan of the chest and echocardiogram.  She will continue present medication.

## 2024-09-17 NOTE — PROGRESS NOTES
AMB US Pelvic Non OB    Date/Time: 9/18/2024 1:00 PM    Performed by: Liv Aguirre  Authorized by: Adam Miranda DO  Universal Protocol:  Consent: Verbal consent obtained.  Consent given by: patient  Timeout called at: 9/18/2024 1:11 PM.  Patient understanding: patient states understanding of the procedure being performed  Patient identity confirmed: verbally with patient    Procedure details:     SIS Procedure: No    Indications: non-obstetric vaginal bleeding      Technique:  Transvaginal US, Non-OB    Position: lithotomy exam    Uterine findings:     Length (cm): 7.26    Height (cm):  3.19    Width (cm):  4.61    Endometrial stripe: identified      Endometrium thickness (mm):  3.82  Left ovary findings:     Left ovary:  Visualized    Length (cm): 1.92    Height (cm): 0.95    Width (cm): 1.07  Right ovary findings:     Right ovary:  Visualized    Length (cm): 1.94    Height (cm): 1.13    Width (cm): 1.33  Other findings:     Free pelvic fluid: not identified      Free peritoneal fluid: not identified    Post-Procedure Details:     Impression:  Anteverted uterus demonstrates fibroids. Largest are right anterior subserosal 1.5cm and left anterior subserosal 1.5cm. The bilateral ovaries appear within normal limits. No free fluid.     Tolerance:  Tolerated well, no immediate complications    Complications: no complications    Additional Procedure Comments:      GE Voluson P8 transvaginal transducer RIC5-RA with Serial Number [id number] was used during procedure and subsequently cleaned with high level disinfection utilizing the Wetradetogether EPR Probe .     Ultrasound performed at:     [facility]  [address] [place] [address]  Phone: [phone]  Fax:  [phone]

## 2024-09-18 ENCOUNTER — OFFICE VISIT (OUTPATIENT)
Dept: GYNECOLOGY | Facility: CLINIC | Age: 64
End: 2024-09-18
Payer: COMMERCIAL

## 2024-09-18 ENCOUNTER — ULTRASOUND (OUTPATIENT)
Dept: GYNECOLOGY | Facility: CLINIC | Age: 64
End: 2024-09-18
Payer: COMMERCIAL

## 2024-09-18 DIAGNOSIS — N95.0 PMB (POSTMENOPAUSAL BLEEDING): Primary | ICD-10-CM

## 2024-09-18 PROCEDURE — 76830 TRANSVAGINAL US NON-OB: CPT | Performed by: OBSTETRICS & GYNECOLOGY

## 2024-09-18 PROCEDURE — 99213 OFFICE O/P EST LOW 20 MIN: CPT | Performed by: OBSTETRICS & GYNECOLOGY

## 2024-09-18 NOTE — PROGRESS NOTES
Assessment/Plan:      Diagnoses and all orders for this visit:    PMB (postmenopausal bleeding)     Reviewed ultrasound findings with patient.  Biopsy results returned as superficial endometrium, benign.  Transvaginal scan today reveals endometrial thickness of less than 4 mm therefore no biopsy was repeated nor saline infusion was performed.  Patient has been advised that should she have any further episodes of bleeding she will need to return to the office    Subjective:     Patient ID: Marina Matos is a 63 y.o. female.    HPI patient was seen by myself on August 15 complaining of vaginal spotting.  This started on August 2.  She had no pain associated with this.  She had denied any vaginal irritation burning or discharge.  She denied any abdominal pain or fever.  No urinary complaints.  She had recently received a steroid injection prior to that.  An endometrial biopsy was obtained on August 15  . Final Diagnosis A. Endometrium, Endo Bx: - Fragments of endometrium. See note. Note: The biopsy is superficial and fragmented. While no malignancy is identified, clinical correlation is required to ensureadequateendometrial sampling.    Advised to return to the office for transvaginal scan possible SIS possible repeat biopsy    Review of Systems      Objective:     Physical Exam    Patient identity confirmed: verbally with patient     Procedure details:     SIS Procedure: No    Indications: non-obstetric vaginal bleeding      Technique:  Transvaginal US, Non-OB    Position: lithotomy exam    Uterine findings:     Length (cm): 7.26    Height (cm):  3.19    Width (cm):  4.61    Endometrial stripe: identified      Endometrium thickness (mm):  3.82  Left ovary findings:     Left ovary:  Visualized    Length (cm): 1.92    Height (cm): 0.95    Width (cm): 1.07  Right ovary findings:     Right ovary:  Visualized    Length (cm): 1.94    Height (cm): 1.13    Width (cm): 1.33  Other findings:     Free pelvic fluid: not  identified      Free peritoneal fluid: not identified    Post-Procedure Details:     Impression:  Anteverted uterus demonstrates fibroids. Largest are right anterior subserosal 1.5cm and left anterior subserosal 1.5cm. The bilateral ovaries appear within normal limits. No free fluid.     Tolerance:  Tolerated well, no immediate complications    Complications: no complications

## 2024-09-19 ENCOUNTER — HOSPITAL ENCOUNTER (OUTPATIENT)
Dept: CT IMAGING | Facility: HOSPITAL | Age: 64
Discharge: HOME | End: 2024-09-19
Payer: COMMERCIAL

## 2024-09-19 ENCOUNTER — HOSPITAL ENCOUNTER (OUTPATIENT)
Dept: NON INVASIVE DIAGNOSTICS | Facility: CLINIC | Age: 64
Discharge: HOME | End: 2024-09-19
Payer: COMMERCIAL

## 2024-09-19 VITALS
SYSTOLIC BLOOD PRESSURE: 138 MMHG | WEIGHT: 246 LBS | BODY MASS INDEX: 35.22 KG/M2 | HEIGHT: 70 IN | DIASTOLIC BLOOD PRESSURE: 90 MMHG | HEART RATE: 65 BPM

## 2024-09-19 DIAGNOSIS — Z82.49 FAMILY HISTORY OF AORTIC ANEURYSM: ICD-10-CM

## 2024-09-19 DIAGNOSIS — R01.1 SYSTOLIC MURMUR: ICD-10-CM

## 2024-09-19 LAB
AORTIC ROOT: 3.5 CM
APICAL FOUR CHAMBER EJECTION FRACTION: 55 %
ASCENDING AORTA: 3.6 CM
AV LVOT MEAN GRADIENT: 2 MMHG
AV LVOT PEAK GRADIENT: 3 MMHG
BSA FOR ECHO PROCEDURE: 2.28 M2
DOP CALC LVOT PEAK VEL VTI: 17.51 CM
DOP CALC LVOT PEAK VEL: 0.83 M/S
E WAVE DECELERATION TIME: 246 MS
E/A RATIO: 0.74
FRACTIONAL SHORTENING: 36 (ref 28–44)
INTERVENTRICULAR SEPTUM IN DIASTOLE (PARASTERNAL SHORT AXIS VIEW): 1.2 CM
INTERVENTRICULAR SEPTUM: 1.2 CM (ref 0.6–1.1)
LAAS-AP2: 22.7 CM2
LAAS-AP4: 24.5 CM2
LEFT ATRIUM SIZE: 4.3 CM
LEFT ATRIUM VOLUME (MOD BIPLANE): 72 ML
LEFT ATRIUM VOLUME INDEX (MOD BIPLANE): 31.6 ML/M2
LEFT INTERNAL DIMENSION IN SYSTOLE: 3 CM (ref 2.1–4)
LEFT VENTRICLE DIASTOLIC VOLUME (MOD BIPLANE): 111 ML
LEFT VENTRICLE DIASTOLIC VOLUME INDEX (MOD BIPLANE): 48.7 ML/M2
LEFT VENTRICLE SYSTOLIC VOLUME (MOD BIPLANE): 44 ML
LEFT VENTRICLE SYSTOLIC VOLUME INDEX (MOD BIPLANE): 19.3 ML/M2
LEFT VENTRICULAR INTERNAL DIMENSION IN DIASTOLE: 4.7 CM (ref 3.5–6)
LEFT VENTRICULAR POSTERIOR WALL IN END DIASTOLE: 1.1 CM
LEFT VENTRICULAR STROKE VOLUME: 67 ML
LV EF: 60 %
LVSV (TEICH): 67 ML
MV E'TISSUE VEL-LAT: 11 CM/S
MV E'TISSUE VEL-SEP: 7 CM/S
MV PEAK A VEL: 0.7 M/S
MV PEAK E VEL: 52 CM/S
MV STENOSIS PRESSURE HALF TIME: 71 MS
MV VALVE AREA P 1/2 METHOD: 3.1
RA PRESSURE ESTIMATED: 5 MMHG
RIGHT ATRIUM AREA SYSTOLE A4C: 20 CM2
RIGHT VENTRICLE ID DIMENSION: 4.4 CM
RV PSP: 29 MMHG
SL CV LEFT ATRIUM LENGTH A2C: 6.1 CM
SL CV LV EF: 60
SL CV PED ECHO LEFT VENTRICLE DIASTOLIC VOLUME (MOD BIPLANE) 2D: 100 ML
SL CV PED ECHO LEFT VENTRICLE SYSTOLIC VOLUME (MOD BIPLANE) 2D: 34 ML
SL CV SINUS OF VALSALVA 2D: 3.3 CM
TR MAX PG: 24 MMHG
TR PEAK VELOCITY: 2.5 M/S
TRICUSPID ANNULAR PLANE SYSTOLIC EXCURSION: 2.3 CM
TRICUSPID VALVE PEAK REGURGITATION VELOCITY: 2.46 M/S

## 2024-09-19 PROCEDURE — 71250 CT THORAX DX C-: CPT

## 2024-09-19 PROCEDURE — 93306 TTE W/DOPPLER COMPLETE: CPT | Performed by: INTERNAL MEDICINE

## 2024-09-19 PROCEDURE — 93306 TTE W/DOPPLER COMPLETE: CPT

## 2024-09-27 ENCOUNTER — OFFICE VISIT (OUTPATIENT)
Dept: BARIATRICS | Facility: CLINIC | Age: 64
End: 2024-09-27
Payer: COMMERCIAL

## 2024-09-27 VITALS
DIASTOLIC BLOOD PRESSURE: 78 MMHG | WEIGHT: 254 LBS | HEIGHT: 70 IN | BODY MASS INDEX: 36.36 KG/M2 | SYSTOLIC BLOOD PRESSURE: 132 MMHG | RESPIRATION RATE: 18 BRPM

## 2024-09-27 DIAGNOSIS — K21.9 GERD (GASTROESOPHAGEAL REFLUX DISEASE): ICD-10-CM

## 2024-09-27 DIAGNOSIS — Z98.890 S/P REPAIR OF PARAESOPHAGEAL HERNIA: ICD-10-CM

## 2024-09-27 DIAGNOSIS — Z48.815 ENCOUNTER FOR SURGICAL AFTERCARE FOLLOWING SURGERY OF DIGESTIVE SYSTEM: Primary | ICD-10-CM

## 2024-09-27 DIAGNOSIS — Z87.19 S/P REPAIR OF PARAESOPHAGEAL HERNIA: ICD-10-CM

## 2024-09-27 PROCEDURE — 99213 OFFICE O/P EST LOW 20 MIN: CPT | Performed by: NURSE PRACTITIONER

## 2024-09-27 NOTE — ASSESSMENT & PLAN NOTE
- doing well since surgery.   - continue with GERD precautions  - follow up as needed with us or GI.

## 2024-09-27 NOTE — PROGRESS NOTES
Ambulatory Visit  Name: Marina Matos      : 1960      MRN: [id number]  Encounter Provider: HELEN Heath  Encounter Date: 2024   Encounter department: [facility]    Assessment & Plan  Encounter for surgical aftercare following surgery of digestive system         S/P repair of paraesophageal hernia         GERD (gastroesophageal reflux disease)           History of Present Illness   {Disappearing Hyperlinks I Encounters * My Last Note * Since Last Visit * History :64017}  Marina Matos is a 63 y.o. female Patient is s/p PEHR and TIF with Dr. Nilesh stewart and Dr. Dee on 2023 here for annual visit. Has been doing well, tolerating a regular diet. GERD has significantly improved. Has been able to taper off omeprazole successfully and has not needed to take any PRN pepcid or tums. Avoiding foods and following GERD precautions to avoid reflux/heartburn from happening again. Tolerating a regular diet. Denies having any N/V/D/C, regurgitation, reflux or dysphagia.      History obtained from : patient  Review of Systems   Constitutional: Negative.    Respiratory: Negative.     Cardiovascular: Negative.    Gastrointestinal: Negative.    Musculoskeletal: Negative.    Neurological: Negative.    Psychiatric/Behavioral: Negative.       Current Outpatient Medications on File Prior to Visit   Medication Sig Dispense Refill   • atorvastatin (LIPITOR) 20 mg tablet TAKE 1 TABLET BY MOUTH EVERY DAY 90 tablet 3   • gabapentin (NEURONTIN) 100 mg capsule Take 1 tablet at bedtime.  May increase to 2 tablets after 3 days (Patient taking differently: PRN) 60 capsule 1   • glucosamine-chondroitin 500-400 MG tablet Take 1 tablet by mouth daily     • Multiple Vitamins-Minerals (MULTIVITAMIN WITH MINERALS) tablet Take 1 tablet by mouth daily     • Vitamin E 134 MG (200 UNIT) capsule Take by mouth daily     • Euflexxa 20 MG/2ML SOSY  (Patient not taking: Reported on 2024)     •  "ofloxacin (OCUFLOX) 0.3 % ophthalmic solution Administer 1 drop to both eyes 4 (four) times a day (Patient not taking: Reported on 9/27/2024) 5 mL 0     No current facility-administered medications on file prior to visit.      Social History     Tobacco Use   • Smoking status: Never     Passive exposure: Past   • Smokeless tobacco: Never   Vaping Use   • Vaping status: Never Used   Substance and Sexual Activity   • Alcohol use: Yes     Alcohol/week: 1.0 standard drink of alcohol     Types: 1 Glasses of wine per week     Comment: 1 x per week   • Drug use: Never   • Sexual activity: Yes     Partners: Male     Birth control/protection: Post-menopausal     Comment: single partner-         Objective   {Disappearing Hyperlinks   Review Vitals * Enter New Vitals * Results Review * Labs * Imaging * Cardiology * Procedures * Lung Cancer Screening * Surgical eConsent :90710}  Resp 18   Ht 5' 10\" (1.778 m)   BMI 35.30 kg/m²     Physical Exam  Vitals and nursing note reviewed.   Constitutional:       General: She is not in acute distress.     Appearance: Normal appearance. She is well-developed. She is obese.   HENT:      Head: Normocephalic and atraumatic.   Eyes:      Conjunctiva/sclera: Conjunctivae normal.   Cardiovascular:      Rate and Rhythm: Normal rate and regular rhythm.      Pulses: Normal pulses.      Heart sounds: Normal heart sounds. No murmur heard.  Pulmonary:      Effort: Pulmonary effort is normal. No respiratory distress.      Breath sounds: Normal breath sounds.   Abdominal:      General: Bowel sounds are normal.      Palpations: Abdomen is soft.      Tenderness: There is no abdominal tenderness.   Musculoskeletal:         General: No swelling.      Cervical back: Neck supple.   Skin:     General: Skin is warm and dry.      Capillary Refill: Capillary refill takes less than 2 seconds.   Neurological:      General: No focal deficit present.      Mental Status: She is alert and oriented to person, " place, and time.   Psychiatric:         Mood and Affect: Mood normal.         Behavior: Behavior normal.         Thought Content: Thought content normal.         Judgment: Judgment normal.     Administrative Statements {Disappearing Hyperlinks I  Level of Service * [facility]:60894}  I have spent a total time of *** minutes in caring for this patient on the day of the visit/encounter including Prognosis, Risks and benefits of tx options, Instructions for management, Patient and family education, Importance of tx compliance, Risk factor reductions, Impressions, Counseling / Coordination of care, Documenting in the medical record, Reviewing / ordering tests, medicine, procedures  , and Obtaining or reviewing history  .

## 2024-09-27 NOTE — PROGRESS NOTES
Date of surgery: 5/22/2023  Procedure: Foregut   Performing surgeon: Dr. Nilehs Becerra     Initial Weight -  269.9 lb   Current Weight - 254.0 lb   Rell Weight - 255.5 lb   Total Body Weight Loss (EWL)- 15.9  EWL% - 17%  TWB % - 6%

## 2024-09-27 NOTE — PROGRESS NOTES
Ambulatory Visit  Name: Marina Matos      : 1960      MRN: [id number]  Encounter Provider: HELEN Heath  Encounter Date: 2024   Encounter department: [facility]    Assessment & Plan  Encounter for surgical aftercare following surgery of digestive system         S/P repair of paraesophageal hernia         GERD (gastroesophageal reflux disease)       - doing well since surgery.   - continue with GERD precautions  - follow up as needed with us or GI.     History of Present Illness     Marina Matos is a 63 y.o. female Patient is s/p PEHR and TIF with Dr. Nilesh stewart and Dr. Dee on 2023 here for annual visit. Has been doing well, tolerating a regular diet. GERD has significantly improved. Has been able to taper off omeprazole successfully and has not needed to take any PRN pepcid or tums. Avoiding foods and following GERD precautions to avoid reflux/heartburn from happening again. Tolerating a regular diet. Denies having any N/V/D/C, regurgitation, reflux or dysphagia.    History obtained from : patient  Review of Systems   Constitutional: Negative.    Respiratory: Negative.     Cardiovascular: Negative.    Gastrointestinal: Negative.      Current Outpatient Medications on File Prior to Visit   Medication Sig Dispense Refill    atorvastatin (LIPITOR) 20 mg tablet TAKE 1 TABLET BY MOUTH EVERY DAY 90 tablet 3    gabapentin (NEURONTIN) 100 mg capsule Take 1 tablet at bedtime.  May increase to 2 tablets after 3 days (Patient taking differently: PRN) 60 capsule 1    glucosamine-chondroitin 500-400 MG tablet Take 1 tablet by mouth daily      Multiple Vitamins-Minerals (MULTIVITAMIN WITH MINERALS) tablet Take 1 tablet by mouth daily      Vitamin E 134 MG (200 UNIT) capsule Take by mouth daily      Euflexxa 20 MG/2ML SOSY  (Patient not taking: Reported on 2024)      ofloxacin (OCUFLOX) 0.3 % ophthalmic solution Administer 1 drop to both eyes 4 (four) times a day  "(Patient not taking: Reported on 9/27/2024) 5 mL 0     No current facility-administered medications on file prior to visit.      Social History     Tobacco Use    Smoking status: Never     Passive exposure: Past    Smokeless tobacco: Never   Vaping Use    Vaping status: Never Used   Substance and Sexual Activity    Alcohol use: Yes     Alcohol/week: 1.0 standard drink of alcohol     Types: 1 Glasses of wine per week     Comment: 1 x per week    Drug use: Never    Sexual activity: Yes     Partners: Male     Birth control/protection: Post-menopausal     Comment: single partner-         Objective     /78 (BP Location: Left arm, Patient Position: Sitting, Cuff Size: Adult)   Resp 18   Ht 5' 10\" (1.778 m)   Wt 115 kg (254 lb)   BMI 36.45 kg/m²     Physical Exam  Vitals and nursing note reviewed.   Constitutional:       General: She is not in acute distress.     Appearance: Normal appearance. She is well-developed. She is obese.   HENT:      Head: Normocephalic and atraumatic.   Eyes:      Conjunctiva/sclera: Conjunctivae normal.   Cardiovascular:      Rate and Rhythm: Normal rate and regular rhythm.      Pulses: Normal pulses.      Heart sounds: No murmur heard.  Pulmonary:      Effort: Pulmonary effort is normal. No respiratory distress.      Breath sounds: Normal breath sounds.   Abdominal:      General: Bowel sounds are normal.      Palpations: Abdomen is soft.      Tenderness: There is no abdominal tenderness.   Musculoskeletal:         General: No swelling. Normal range of motion.      Cervical back: Neck supple.   Skin:     General: Skin is warm and dry.      Capillary Refill: Capillary refill takes less than 2 seconds.   Neurological:      General: No focal deficit present.      Mental Status: She is alert and oriented to person, place, and time.   Psychiatric:         Mood and Affect: Mood normal.         Behavior: Behavior normal.         Thought Content: Thought content normal.         " Judgment: Judgment normal.

## 2025-01-24 ENCOUNTER — HOSPITAL ENCOUNTER (OUTPATIENT)
Dept: RADIOLOGY | Age: 65
Discharge: HOME | End: 2025-01-24
Payer: COMMERCIAL

## 2025-01-24 VITALS — WEIGHT: 254 LBS | BODY MASS INDEX: 35.56 KG/M2 | HEIGHT: 71 IN

## 2025-01-24 DIAGNOSIS — Z12.31 VISIT FOR SCREENING MAMMOGRAM: ICD-10-CM

## 2025-01-24 PROCEDURE — 77067 SCR MAMMO BI INCL CAD: CPT

## 2025-01-24 PROCEDURE — 77063 BREAST TOMOSYNTHESIS BI: CPT

## (undated) DEVICE — BIO-A TISSUE REINFORCEMENT 7CMX10CM
Type: IMPLANTABLE DEVICE | Site: ABDOMEN | Status: NON-FUNCTIONAL
Brand: GORE BIO-A TISSUE REINFORCEMENT

## (undated) DEVICE — VIOLET BRAIDED (POLYGLACTIN 910), SYNTHETIC ABSORBABLE SUTURE: Brand: COATED VICRYL

## (undated) DEVICE — CANNULA SEAL

## (undated) DEVICE — MEGA SUTURECUT ND: Brand: ENDOWRIST

## (undated) DEVICE — LAPAROSCOPIC DUAL RIGID APPLICATOR: Brand: ETHICON

## (undated) DEVICE — ENDOPATH XCEL BLADELESS TROCARS WITH STABILITY SLEEVES: Brand: ENDOPATH XCEL

## (undated) DEVICE — [HIGH FLOW INSUFFLATOR,  DO NOT USE IF PACKAGE IS DAMAGED,  KEEP DRY,  KEEP AWAY FROM SUNLIGHT,  PROTECT FROM HEAT AND RADIOACTIVE SOURCES.]: Brand: PNEUMOSURE

## (undated) DEVICE — URETERAL CATHETER ADAPTOR TIP

## (undated) DEVICE — CADIERE FORCEPS: Brand: ENDOWRIST

## (undated) DEVICE — WEBRIL 6 IN UNSTERILE

## (undated) DEVICE — SUT MONOCRYL 4-0 PS-2 27 IN Y426H

## (undated) DEVICE — KIT, ROBOTIC BARIATRIC: Brand: CARDINAL HEALTH

## (undated) DEVICE — 3000CC GUARDIAN II: Brand: GUARDIAN

## (undated) DEVICE — STRL PENROSE DRAIN 18" X 1/4": Brand: CARDINAL HEALTH

## (undated) DEVICE — BLADELESS OBTURATOR: Brand: WECK VISTA

## (undated) DEVICE — SCD SEQUENTIAL COMPRESSION COMFORT SLEEVE MEDIUM KNEE LENGTH: Brand: KENDALL SCD

## (undated) DEVICE — GLOVE SRG BIOGEL 7.5

## (undated) DEVICE — DISPOSABLE OR TOWEL: Brand: CARDINAL HEALTH

## (undated) DEVICE — ADHESIVE SKIN CLSR DERMABOND NX

## (undated) DEVICE — COLUMN DRAPE

## (undated) DEVICE — SUT ETHIBOND 0 CT-1 30 IN X424H

## (undated) DEVICE — SEALANT FIBRIN VISTASEAL 10ML

## (undated) DEVICE — VESSEL SEALER EXTEND: Brand: ENDOWRIST

## (undated) DEVICE — DRAPE EQUIPMENT RF WAND

## (undated) DEVICE — PLUMEPEN PRO 10FT

## (undated) DEVICE — 2000CC GUARDIAN II: Brand: GUARDIAN

## (undated) DEVICE — TROCAR: Brand: KII FIOS FIRST ENTRY

## (undated) DEVICE — TRAVELKIT CONTAINS FIRST STEP KIT (200ML EP-4 KIT) AND SOILED SCOPE BAG - 1 KIT: Brand: TRAVELKIT CONTAINS FIRST STEP KIT AND SOILED SCOPE BAG

## (undated) DEVICE — DECANTER: Brand: UNBRANDED

## (undated) DEVICE — ARM DRAPE

## (undated) DEVICE — SURGICAL GOWN, XL SMARTSLEEVE: Brand: CONVERTORS